# Patient Record
Sex: FEMALE | Race: WHITE | NOT HISPANIC OR LATINO | Employment: FULL TIME | URBAN - METROPOLITAN AREA
[De-identification: names, ages, dates, MRNs, and addresses within clinical notes are randomized per-mention and may not be internally consistent; named-entity substitution may affect disease eponyms.]

---

## 2017-01-03 ENCOUNTER — TRANSFERRED RECORDS (OUTPATIENT)
Dept: HEALTH INFORMATION MANAGEMENT | Facility: CLINIC | Age: 57
End: 2017-01-03

## 2017-01-06 ENCOUNTER — OFFICE VISIT (OUTPATIENT)
Dept: FAMILY MEDICINE | Facility: CLINIC | Age: 57
End: 2017-01-06
Payer: OTHER MISCELLANEOUS

## 2017-01-06 VITALS
DIASTOLIC BLOOD PRESSURE: 73 MMHG | TEMPERATURE: 97.4 F | WEIGHT: 147 LBS | HEIGHT: 63 IN | OXYGEN SATURATION: 100 % | RESPIRATION RATE: 12 BRPM | BODY MASS INDEX: 26.05 KG/M2 | HEART RATE: 77 BPM | SYSTOLIC BLOOD PRESSURE: 115 MMHG

## 2017-01-06 DIAGNOSIS — S52.124D CLOSED NONDISPLACED FRACTURE OF HEAD OF RIGHT RADIUS WITH ROUTINE HEALING, SUBSEQUENT ENCOUNTER: ICD-10-CM

## 2017-01-06 DIAGNOSIS — Z01.818 PREOP GENERAL PHYSICAL EXAM: Primary | ICD-10-CM

## 2017-01-06 LAB — HGB BLD-MCNC: 12.9 G/DL (ref 11.7–15.7)

## 2017-01-06 PROCEDURE — 99214 OFFICE O/P EST MOD 30 MIN: CPT | Performed by: NURSE PRACTITIONER

## 2017-01-06 PROCEDURE — 85018 HEMOGLOBIN: CPT | Performed by: NURSE PRACTITIONER

## 2017-01-06 PROCEDURE — 93000 ELECTROCARDIOGRAM COMPLETE: CPT | Performed by: NURSE PRACTITIONER

## 2017-01-06 PROCEDURE — 36415 COLL VENOUS BLD VENIPUNCTURE: CPT | Performed by: NURSE PRACTITIONER

## 2017-01-06 NOTE — NURSING NOTE
"Chief Complaint   Patient presents with     Pre-Op Exam     /73 mmHg  Pulse 77  Temp(Src) 97.4  F (36.3  C) (Tympanic)  Resp 12  Ht 5' 3\" (1.6 m)  Wt 147 lb (66.679 kg)  BMI 26.05 kg/m2  SpO2 100%  LMP 12/18/2004  Breastfeeding? No Estimated body mass index is 26.05 kg/(m^2) as calculated from the following:    Height as of this encounter: 5' 3\" (1.6 m).    Weight as of this encounter: 147 lb (66.679 kg).  bp completed using cuff size: regular      Health Maintenance that is potentially due pending provider review:  NONE    n/a    DOMENIC Glass    "

## 2017-01-06 NOTE — PROGRESS NOTES
[address] [address]  [phone]  Dept: [phone]    PRE-OP EVALUATION:  Today's date: 2017    Sejal Devlin (: 1960) presents for pre-operative evaluation assessment as requested by Dr. Hooks.  She requires evaluation and anesthesia risk assessment prior to undergoing surgery/procedure for treatment of Right forearm fracture.  Proposed procedure: Plate for broken right forearm    Date of Surgery/ Procedure: 17  Time of Surgery/ Procedure: [facility]  Hospital/Surgical Facility: [facility]   Fax number for surgical facility: [phone]  Primary Physician: Marita Freitas  Type of Anesthesia Anticipated: to be determined    Patient has a Health Care Directive or Living Will:  YES     1. NO - Do you have a history of heart attack, stroke, stent, bypass or surgery on an artery in the head, neck, heart or legs?  2. NO - Do you ever have any pain or discomfort in your chest?  3. NO - Do you have a history of  Heart Failure?  4. NO - Are you troubled by shortness of breath when: walking on the level, up a slight hill or at night?  5. NO - Do you currently have a cold, bronchitis or other respiratory infection?  6. NO - Do you have a cough, shortness of breath or wheezing?  7. NO - Do you sometimes get pains in the calves of your legs when you walk?  8. NO - Do you or anyone in your family have previous history of blood clots?  9. NO - Do you or does anyone in your family have a serious bleeding problem such as prolonged bleeding following surgeries or cuts?  10. NO - Have you ever had problems with anemia or been told to take iron pills?  11. NO - Have you had any abnormal blood loss such as black, tarry or bloody stools, or abnormal vaginal bleeding?  12. NO - Have you ever had a blood transfusion?  13. NO - Have you or any of your relatives ever had problems with anesthesia?  14. NO - Do you have sleep apnea, excessive snoring  or daytime drowsiness?  15. NO - Do you have any prosthetic heart valves?  16. NO - Do you have prosthetic joints?  17. NO - Is there any chance that you may be pregnant?      HPI:                                                      Brief HPI related to upcoming procedure:   Sejal reports that she fell on Friday,  and broke her right arm.  She went to [facility].  She has splint on her right arm.  She is now scheduled for surgery.        MEDICAL HISTORY:                                                      Patient Active Problem List    Diagnosis Date Noted     Vitamin D deficiency 2016     Priority: Medium     Dry mouth 2013     Priority: Medium     Circulation problem 10/02/2013     Priority: Medium     Rash 2013     Priority: Medium     Family history of colon cancer 2012     Priority: Medium     Mother         Hyperlipidemia LDL goal <160 10/31/2010     Priority: Medium     Per provider       Osteopenia 2010     Priority: Medium      Past Medical History   Diagnosis Date     Postmenopausal 2005     Diverticulosis of colon 2008     as noted on colonoscopy     Family history of colon cancer 2012     Past Surgical History   Procedure Laterality Date     C removal gallbladder       Cholecystectomy     Appendectomy       Surgical history of -        c- section     Colonoscopy       Gyn surgery            Current Outpatient Prescriptions   Medication Sig Dispense Refill     VITAMIN D, CHOLECALCIFEROL, PO Take by mouth daily       GNP GARLIC EXTRACT PO        aspirin EC 81 MG tablet Take 81 mg by mouth every other day.       OTC products: NSAIDS Ibuprofen, last dose 2017    Allergies   Allergen Reactions     No Known Drug Allergies      Seasonal Allergies       Latex Allergy: NO    Social History   Substance Use Topics     Smoking status: Former Smoker -- 0.50 packs/day for 10 years     Types: Cigarettes     Quit date: 2008  "    Smokeless tobacco: Never Used      Comment: smoked age [age], quit x 1 yr and resumed then quit after one yr     Alcohol Use: Yes      Comment: one beer a day     History   Drug Use No       REVIEW OF SYSTEMS:                                                    C: NEGATIVE for fever, chills, change in weight  I: NEGATIVE for worrisome rashes  E: NEGATIVE for vision changes or irritation  E/M: NEGATIVE for ear, mouth and throat problems  R: NEGATIVE for significant cough or SOB  CV: NEGATIVE for chest pain, palpitations or peripheral edema  GI: NEGATIVE for nausea, abdominal pain, heartburn, or change in bowel habits  : NEGATIVE for frequency, dysuria, or hematuria  M: POSITIVE for right arm fracture. NEGATIVE for other muscle or joint issues  N: NEGATIVE for weakness, dizziness or paresthesias  E: NEGATIVE for temperature intolerance, skin/hair changes  H: NEGATIVE for bleeding or bruising problems  P: NEGATIVE for changes in mood or affect    EXAM:                                                    /73 mmHg  Pulse 77  Temp(Src) 97.4  F (36.3  C) (Tympanic)  Resp 12  Ht 5' 3\" (1.6 m)  Wt 147 lb (66.679 kg)  BMI 26.05 kg/m2  SpO2 100%  LMP 12/18/2004  Breastfeeding? No    GENERAL APPEARANCE: healthy, alert and no distress     EYES: EOMI,- PERRL     HENT: ear canals and TM's normal and nose and mouth without ulcers or lesions     NECK: no adenopathy, no asymmetry, masses, or scars and thyroid normal to palpation     RESP: lungs clear to auscultation - no rales, rhonchi or wheezes     CV: regular rates and rhythm, normal S1 S2, no S3 or S4 and no murmur, click or rub -     ABDOMEN:  soft, nontender, no HSM or masses and bowel sounds normal     MS: right arm is in a splint. CWMS intact to right hand fingers. all other extremities normal- no gross deformities noted, no evidence of inflammation in joints, FROM in all extremities.      SKIN: no suspicious lesions or rashes     NEURO: Normal strength and " tone, sensory exam grossly normal, mentation intact and speech normal     PSYCH: mentation appears normal. and affect normal/bright     LYMPHATICS: No axillary, cervical, inguinal, or supraclavicular nodes    DIAGNOSTICS:                                                      Labs Resulted Today:   Results for orders placed or performed in visit on 01/06/17   Hemoglobin   Result Value Ref Range    Hemoglobin 12.9 11.7 - 15.7 g/dL     EKG: NSR    Recent Labs   Lab Test  09/26/16   1627  08/29/14   1706  03/05/14   1619  10/21/13   1428   HGB  13.2  13.3  13.0  14.2   PLT   --   231  224   --    NA   --    --   140  138   POTASSIUM   --    --   4.0  5.3   CR   --   0.77  0.85  0.73   A1C   --    --   5.7   --         IMPRESSION:                                                    Reason for surgery/procedure: right arm fracture  Diagnosis/reason for consult: pre-op    The proposed surgical procedure is considered INTERMEDIATE risk.    REVISED CARDIAC RISK INDEX  The patient has the following serious cardiovascular risks for perioperative complications such as (MI, PE, VFib and 3  AV Block):  No serious cardiac risks  INTERPRETATION: 0 risks: Class I (very low risk - 0.4% complication rate)    The patient has the following additional risks for perioperative complications:  No identified additional risks      ICD-10-CM    1. Preop general physical exam Z01.818 Hemoglobin     EKG 12-lead complete w/read - Clinics   2. Closed nondisplaced fracture of head of right radius with routine healing, subsequent encounter S52.124D Hemoglobin     EKG 12-lead complete w/read - Clinics       RECOMMENDATIONS:                                                        --Patient does not have scheduled medications the day of surgery    APPROVAL GIVEN to proceed with proposed procedure, without further diagnostic evaluation       Signed Electronically by: JONATAN Peraza CNP    Copy of this evaluation report is provided to  requesting physician.    [place] Preop Guidelines

## 2017-01-09 ENCOUNTER — TRANSFERRED RECORDS (OUTPATIENT)
Dept: HEALTH INFORMATION MANAGEMENT | Facility: CLINIC | Age: 57
End: 2017-01-09

## 2017-01-09 NOTE — PROGRESS NOTES
Quick Note:    Angela Post,    This is to let you know that the results of your recent blood tests are all normal.     Let me know if you have any questions.    Marita CHEUNG CNP    ______

## 2017-01-17 ENCOUNTER — TRANSFERRED RECORDS (OUTPATIENT)
Dept: HEALTH INFORMATION MANAGEMENT | Facility: CLINIC | Age: 57
End: 2017-01-17

## 2017-02-08 ENCOUNTER — TRANSFERRED RECORDS (OUTPATIENT)
Dept: HEALTH INFORMATION MANAGEMENT | Facility: CLINIC | Age: 57
End: 2017-02-08

## 2017-03-17 ENCOUNTER — TRANSFERRED RECORDS (OUTPATIENT)
Dept: HEALTH INFORMATION MANAGEMENT | Facility: CLINIC | Age: 57
End: 2017-03-17

## 2017-05-16 ENCOUNTER — MYC MEDICAL ADVICE (OUTPATIENT)
Dept: FAMILY MEDICINE | Facility: CLINIC | Age: 57
End: 2017-05-16

## 2017-05-24 ENCOUNTER — OFFICE VISIT (OUTPATIENT)
Dept: FAMILY MEDICINE | Facility: CLINIC | Age: 57
End: 2017-05-24
Payer: OTHER MISCELLANEOUS

## 2017-05-24 ENCOUNTER — RADIANT APPOINTMENT (OUTPATIENT)
Dept: GENERAL RADIOLOGY | Facility: CLINIC | Age: 57
End: 2017-05-24
Attending: NURSE PRACTITIONER
Payer: COMMERCIAL

## 2017-05-24 VITALS
OXYGEN SATURATION: 100 % | WEIGHT: 149.6 LBS | HEART RATE: 75 BPM | RESPIRATION RATE: 18 BRPM | DIASTOLIC BLOOD PRESSURE: 80 MMHG | TEMPERATURE: 98.6 F | BODY MASS INDEX: 26.5 KG/M2 | SYSTOLIC BLOOD PRESSURE: 125 MMHG

## 2017-05-24 DIAGNOSIS — M35.00 SJOGREN'S SYNDROME (H): ICD-10-CM

## 2017-05-24 DIAGNOSIS — R91.8 PULMONARY NODULES: ICD-10-CM

## 2017-05-24 DIAGNOSIS — R05.9 COUGH: Primary | ICD-10-CM

## 2017-05-24 DIAGNOSIS — R05.9 COUGH: ICD-10-CM

## 2017-05-24 PROCEDURE — 71020 XR CHEST 2 VW: CPT

## 2017-05-24 PROCEDURE — 99214 OFFICE O/P EST MOD 30 MIN: CPT | Performed by: NURSE PRACTITIONER

## 2017-05-24 NOTE — PROGRESS NOTES
SUBJECTIVE:                                                    Sejal Devlin is a 56 year old female who presents to clinic today for the following health issues:  Chief Complaint   Patient presents with     Cough       Cough    Duration: >8 weeks    Description (location/character/radiation): Sejal had symptoms that started  with c/o a URI. Her general URI symptoms improved, but the cough continued.  Today, no fever. Eating and drinking okay. Her cough is dry and hacky. Her symptoms are usually better in the morning and then get progressively worse as the day goes along. She did take a round of azithromycin, but that did not resolve her symptoms. She also uses tessalon 100mg, which helps some. Sometimes she feels tight and wheezy. No shortness of breath.  No history of asthma or allergies. No GERD or indigestion.      Intensity:  moderate    Accompanying signs and symptoms: none    Therapies tried and outcome:  Pt has been on Azithromycin and Benzonatate, cough drops.        History of Sjogren's.  Last seen by Dr. Chandler     Problem list and histories reviewed & adjusted, as indicated.  Additional history: as documented    Patient Active Problem List   Diagnosis     Osteopenia     Hyperlipidemia LDL goal <160     Family history of colon cancer     Rash     Circulation problem     Dry mouth     Vitamin D deficiency     Sjogren's syndrome (H)     Past Surgical History:   Procedure Laterality Date     APPENDECTOMY       C REMOVAL GALLBLADDER      Cholecystectomy     COLONOSCOPY  2013     GYN SURGERY           SURGICAL HISTORY OF -       c- section       Social History   Substance Use Topics     Smoking status: Former Smoker     Packs/day: 0.50     Years: 10.00     Types: Cigarettes     Start date: 2015     Quit date: 2008     Smokeless tobacco: Never Used      Comment: smoked age [age], quit x 1 yr and resumed then quit after one yr     Alcohol use Yes      Comment: one  beer a day     Family History   Problem Relation Age of Onset     ALPESHAMERON Mother      50s     Cancer - colorectal Mother       age 64, diagnosed age 54     Circulatory Mother      Coronary Artery Disease Mother      Colon Cancer Mother      C.A.CLAUDIO Father      Eye Disorder Father      glaucoma     Lipids Father      CANCER Father      kidney     Hyperlipidemia Father      Substance Abuse Brother      Cardiovascular Brother      MI, stent at 52     Connective Tissue Disorder Other      cousin on father side has lupus     Coronary Artery Disease Brother          Current Outpatient Prescriptions   Medication Sig Dispense Refill     VITAMIN D, CHOLECALCIFEROL, PO Take by mouth daily       GNP GARLIC EXTRACT PO        aspirin EC 81 MG tablet Take 81 mg by mouth every other day.       Allergies   Allergen Reactions     No Known Drug Allergies      Seasonal Allergies      Recent Labs   Lab Test  16   1627  14   1706  14   1619  10/21/13   1428  13   1451  12   0839  11   0830   A1C   --    --   5.7   --    --    --    --    LDL  115*   --    --    --    --   147*  176*   HDL  47*   --    --    --    --   49*  55   TRIG  237*   --    --    --    --   151*  155*   ALT   --   22  25  29  23   --    --    CR   --   0.77  0.85  0.73  0.74   --    --    GFRESTIMATED   --   77  70  83  82   --    --    GFRESTBLACK   --   >90   GFR Calc    84  >90  >90   --    --    POTASSIUM   --    --   4.0  5.3  4.6   --    --    TSH   --    --    --   1.24  0.85   --    --       BP Readings from Last 3 Encounters:   17 125/80   17 115/73   16 134/87    Wt Readings from Last 3 Encounters:   17 149 lb 9.6 oz (67.9 kg)   17 147 lb (66.7 kg)   16 145 lb (65.8 kg)                  Labs reviewed in EPIC    Reviewed and updated as needed this visit by clinical staff  Tobacco  Allergies  Med Hx  Surg Hx  Fam Hx  Soc Hx      Reviewed and updated as needed  this visit by Provider         ROS:  Constitutional, HEENT, cardiovascular, pulmonary, gi and gu systems are negative, except as otherwise noted.    OBJECTIVE:                                                    /80  Pulse 75  Temp 98.6  F (37  C) (Oral)  Resp 18  Wt 149 lb 9.6 oz (67.9 kg)  LMP 12/18/2004  SpO2 100%  Breastfeeding? No  BMI 26.5 kg/m2  Body mass index is 26.5 kg/(m^2).  EXAM:  Constitutional: healthy, alert, active and mild distress. Tearful.  Neck: Neck supple. No adenopathy.  ENT: Bilateral TM's are normal.  Posterior oropharynx is clear.  Nares clear without congestion.  Cardiovascular: S1, S2  Respiratory: occassional hacky cough noted.  Respirations easy and regular. No respiratory distress. Lungs sounds with rare insp/exp wheeze.    Skin: warm and dry  Psychiatric: mentation appears normal. and affect normal/bright       ASSESSMENT/PLAN:                                                    (R05) Cough  (primary encounter diagnosis)  Comment:   Plan: XR Chest 2 Views, fluticasone-salmeterol         (ADVAIR) 100-50 MCG/DOSE diskus inhaler, CT         Chest w/o Contrast            (M35.00) Sjogren's syndrome (H)  Comment:   Plan: XR Chest 2 Views, fluticasone-salmeterol         (ADVAIR) 100-50 MCG/DOSE diskus inhaler, CT         Chest w/o Contrast            (R91.8) Pulmonary nodules  Comment: 5 mm pulmonary nodule along the left major fissure. Uncertain of current status.  Plan: XR Chest 2 Views, fluticasone-salmeterol         (ADVAIR) 100-50 MCG/DOSE diskus inhaler, CT         Chest w/o Contrast            During the patient's clinic appointment, I placed a telephone call to the [facility] rheumatologist on call.  I was able to have a discussion with Dr. Chandler regarding Sejal, her cough, or history, and establish a plan of care.  With Dr. Chandler's recommendation, I did place the order for the computed tomography scan with contrast. The patient's chest x-ray today is  clear.  This was discussed with Sejal.  She will schedule a computed tomography scan with contrast at earliest convenience.  I also started her on Advair per Dr. Chandler's request.  She will use humidity and cough lozenges in the meantime.  She will expect a call from Dr. Chandler's staff to schedule a clinic appointment for Sjogren's follow-up and discussion of the results of the chest CT. The patient was reassured.  She was very relieved with all this information.  She will continue symptomatic management and will follow up as instructed.     Total: 30 minutes was spent with the patient, greater than 50% of the time was spent face-to-face regarding her of symptoms, reviewing her history, having an active discussion with the rheumatologist on call, and developing a plan of care.    Patient Instructions   I will mychart message you with the final radiologist's review of your chest xray from today.  Take care of yourself with a healthy diet, fluids, rest, etc.     Advair inhaler: use the inhaler twice a day. Rinse your mouth out after use.  Tessalon: you can still continue intermittently for cough.    Chest CT:  Proceed with the chest CT at your earliest convenience.  You can call [phone], ask to be scheduled in the [facility].    Per Dr. Chandler, I am to send a note to her through the computer staff messages.  Her staff will be notified that you need an appointment for follow up and they should be calling you to schedule that.            JONATAN Peraza [facility]

## 2017-05-24 NOTE — MR AVS SNAPSHOT
After Visit Summary   5/24/2017    Sejal Devlin    MRN: [id number]           Patient Information     Date Of Birth          1960        Visit Information        Provider Department      5/24/2017 2:20 PM Marita Freitas APRN CNP [facility]        Today's Diagnoses     Cough    -  1    Sjogren's syndrome (H)        Pulmonary nodules          Care Instructions    I will mychart message you with the final radiologist's review of your chest xray from today.  Take care of yourself with a healthy diet, fluids, rest, etc.     Advair inhaler: use the inhaler twice a day. Rinse your mouth out after use.  Tessalon: you can still continue intermittently for cough.    Chest CT:  Proceed with the chest CT at your earliest convenience.  You can call [phone], ask to be scheduled in the [facility] building.    Per Dr. Chandler, I am to send a note to her through the computer staff messages.  Her staff will be notified that you need an appointment for follow up and they should be calling you to schedule that.              Follow-ups after your visit        Future tests that were ordered for you today     Open Future Orders        Priority Expected Expires Ordered    CT Chest w/o Contrast Routine  5/24/2018 5/24/2017            Who to contact     If you have questions or need follow up information about today's clinic visit or your schedule please contact [facility] directly at [phone].  Normal or non-critical lab and imaging results will be communicated to you by MyChart, letter or phone within 4 business days after the clinic has received the results. If you do not hear from us within 7 days, please contact the clinic through MyChart or phone. If you have a critical or abnormal lab result, we will notify you by phone as soon as possible.  Submit refill requests through [place] or call your pharmacy and they will forward the refill request to us. Please allow 3  business days for your refill to be completed.          Additional Information About Your Visit        MyChart Information     [employer] gives you secure access to your electronic health record. If you see a primary care provider, you can also send messages to your care team and make appointments. If you have questions, please call your primary care clinic.  If you do not have a primary care provider, please call [phone] and they will assist you.        Care EveryWhere ID     This is your Care EveryWhere ID. This could be used by other organizations to access your [facility] medical records  [id number]        Your Vitals Were     Pulse Temperature Respirations Last Period Pulse Oximetry Breastfeeding?    75 98.6  F (37  C) (Oral) 18 12/18/2004 100% No    BMI (Body Mass Index)                   26.5 kg/m2            Blood Pressure from Last 3 Encounters:   05/24/17 125/80   01/06/17 115/73   11/21/16 134/87    Weight from Last 3 Encounters:   05/24/17 149 lb 9.6 oz (67.9 kg)   01/06/17 147 lb (66.7 kg)   11/21/16 145 lb (65.8 kg)                 Today's Medication Changes          These changes are accurate as of: 5/24/17  3:22 PM.  If you have any questions, ask your nurse or doctor.               Start taking these medicines.        Dose/Directions    fluticasone-salmeterol 100-50 MCG/DOSE diskus inhaler   Commonly known as:  ADVAIR   Used for:  Cough, Sjogren's syndrome (H), Pulmonary nodules   Started by:  Marita Freitas APRN CNP        Dose:  1 puff   Inhale 1 puff into the lungs 2 times daily   Quantity:  1 Inhaler   Refills:  1            Where to get your medicines      These medications were sent to [facility] [address] [place] [address]), [place] - [address]) [place] [address]     Phone:  [phone]     fluticasone-salmeterol 100-50 MCG/DOSE diskus inhaler                Primary Care Provider Office Phone # Fax #    Marita Freitas  JONATAN [phone]       [facility] [address] [place] [address]        Thank you!     Thank you for choosing [facility]  for your care. Our goal is always to provide you with excellent care. Hearing back from our patients is one way we can continue to improve our services. Please take a few minutes to complete the written survey that you may receive in the mail after your visit with us. Thank you!             Your Updated Medication List - Protect others around you: Learn how to safely use, store and throw away your medicines at www.disposemymeds.org.          This list is accurate as of: 5/24/17  3:22 PM.  Always use your most recent med list.                   Brand Name Dispense Instructions for use    aspirin EC 81 MG EC tablet      Take 81 mg by mouth every other day.       fluticasone-salmeterol 100-50 MCG/DOSE diskus inhaler    ADVAIR    1 Inhaler    Inhale 1 puff into the lungs 2 times daily       GNP GARLIC EXTRACT PO          VITAMIN D (CHOLECALCIFEROL) PO      Take by mouth daily

## 2017-05-24 NOTE — NURSING NOTE
"Chief Complaint   Patient presents with     Chronic Cough       Initial /80  Pulse 75  Temp 98.6  F (37  C) (Oral)  Resp 18  Wt 149 lb 9.6 oz (67.9 kg)  LMP 12/18/2004  SpO2 100%  Breastfeeding? No  BMI 26.5 kg/m2 Estimated body mass index is 26.5 kg/(m^2) as calculated from the following:    Height as of 1/6/17: 5' 3\" (1.6 m).    Weight as of this encounter: 149 lb 9.6 oz (67.9 kg).  Medication Reconciliation: complete     Mylene Cruz MA    "

## 2017-05-24 NOTE — PATIENT INSTRUCTIONS
I will mychart message you with the final radiologist's review of your chest xray from today.  Take care of yourself with a healthy diet, fluids, rest, etc.     Advair inhaler: use the inhaler twice a day. Rinse your mouth out after use.  Tessalon: you can still continue intermittently for cough.    Chest CT:  Proceed with the chest CT at your earliest convenience.  You can call [phone], ask to be scheduled in the [facility] building.    Per Dr. Chandler, I am to send a note to her through the computer staff messages.  Her staff will be notified that you need an appointment for follow up and they should be calling you to schedule that.

## 2017-05-25 NOTE — PROGRESS NOTES
Angela Post,    Here is the radiologist's final reading on your chest xray. Let me know if you have any questions.    Marita

## 2017-05-30 ENCOUNTER — MYC MEDICAL ADVICE (OUTPATIENT)
Dept: FAMILY MEDICINE | Facility: CLINIC | Age: 57
End: 2017-05-30

## 2017-05-30 ENCOUNTER — TELEPHONE (OUTPATIENT)
Dept: RHEUMATOLOGY | Facility: CLINIC | Age: 57
End: 2017-05-30

## 2017-05-30 NOTE — TELEPHONE ENCOUNTER
----- Message from Acacia Chandler MD sent at 5/26/2017  8:57 AM CDT -----  Regarding: RE: appointment/follow up  Sure, I'll follow chest CT. Roxana, could you please help to schedule her at my next available appointment.  ----- Message -----     From: Marita Freitas APRN CNP     Sent: 5/25/2017   9:37 AM       To: Acacia Chandler MD  Subject: appointment/follow up                            [facility] Dr. Chandler,    Thank you for taking my call yesterday. This note is to you to request an appointment for Sejal with you after she completes her CT scan of the chest.    Thank you for your help!  Marita CHEUNG CNP

## 2017-05-31 NOTE — TELEPHONE ENCOUNTER
Called and spoke with pt in order to set up an appt.  Discussed that she does have to pay for a lot of healthcare out of pocket, so does need to space things out so she can afford them.  Pt will be making Chest CT for about a month, as she does want to see if the inhalers that she was prescribed, help.  Pt has set up an appt for Dr. Chandler for October, that is the next available. Pt made aware that she does need to be seen by Dr. Chandler at least once every 3 years.  Pt understands and is fine with appt in October.    Pt had no further questions and is aware that Dr. Chandler will follow up with her after CT has been read.    Roxana Cantu RN  [facility]

## 2017-06-13 ENCOUNTER — MYC MEDICAL ADVICE (OUTPATIENT)
Dept: FAMILY MEDICINE | Facility: CLINIC | Age: 57
End: 2017-06-13

## 2017-07-12 ENCOUNTER — MYC MEDICAL ADVICE (OUTPATIENT)
Dept: FAMILY MEDICINE | Facility: CLINIC | Age: 57
End: 2017-07-12

## 2017-07-13 NOTE — TELEPHONE ENCOUNTER
Medication picked up and patient CT results communicated by PCP. No further action at this time.     Thanks! Radha Baig RN

## 2017-07-24 ENCOUNTER — TELEPHONE (OUTPATIENT)
Dept: FAMILY MEDICINE | Facility: CLINIC | Age: 57
End: 2017-07-24

## 2017-07-24 NOTE — TELEPHONE ENCOUNTER
"I did reach pt to triage this    :\"it is nothing new\" \"I might be dealing with chronic bronchitis\" \"Had cold in March and cough never went away\" Used Advair which helped and is using it now also. CT scan looked normal but a 4 mm nodule that \"they are not even worried about\"     States these are not new sx but ongoing and she feels ok to wait until 8/1    If symptoms worsen or change to call back. If after hours to UC/ER.  Susanne Lockett RN    "

## 2017-07-24 NOTE — TELEPHONE ENCOUNTER
Pt has made an appt through [facility] on 8/1 for following reason:    8/1/2017     7:20 AM  20 mins.  Marita Freitas, APRN CNP [facility]           Patient Comments:     Primary Care     1) Discuss ct chest and plan of action for continued      cough and shortness of breath     2) Muscle spasms in right jaw, approximately 8 to 10 a      day.  Not painful but ongoing, daily.  (Not previously      reported.  Started a few weeks ago.  Thought it would      cease, so didn't report, but it persists.)             M          Message sent to triage RN to assess    Susanne Lockett, RN, BSN

## 2017-08-01 ENCOUNTER — OFFICE VISIT (OUTPATIENT)
Dept: FAMILY MEDICINE | Facility: CLINIC | Age: 57
End: 2017-08-01
Payer: COMMERCIAL

## 2017-08-01 VITALS
RESPIRATION RATE: 24 BRPM | HEIGHT: 63 IN | WEIGHT: 150 LBS | OXYGEN SATURATION: 100 % | TEMPERATURE: 97.5 F | DIASTOLIC BLOOD PRESSURE: 78 MMHG | HEART RATE: 78 BPM | SYSTOLIC BLOOD PRESSURE: 123 MMHG | BODY MASS INDEX: 26.58 KG/M2

## 2017-08-01 DIAGNOSIS — R05.9 COUGH: Primary | ICD-10-CM

## 2017-08-01 DIAGNOSIS — M35.00 SJOGREN'S SYNDROME, WITH UNSPECIFIED ORGAN INVOLVEMENT (H): ICD-10-CM

## 2017-08-01 PROCEDURE — 99213 OFFICE O/P EST LOW 20 MIN: CPT | Performed by: NURSE PRACTITIONER

## 2017-08-01 RX ORDER — ALBUTEROL SULFATE 90 UG/1
1-2 AEROSOL, METERED RESPIRATORY (INHALATION) EVERY 4 HOURS PRN
Qty: 1 INHALER | Refills: 3 | Status: SHIPPED | OUTPATIENT
Start: 2017-08-01 | End: 2017-10-11

## 2017-08-01 NOTE — Clinical Note
Angela Chandler  Sejal's hacky cough has seemed to increase in frequency since I last saw her in the clinic. She's been taking her Advair twice a day as prescribed and she declined a dose adjustment on that. I did refer her to pulmonology for cough workup. She will continue/come in to see you in October as planned. Please notify this patient if an earlier appointment is available.  Thank you! Marita CHEUNG CNP

## 2017-08-01 NOTE — MR AVS SNAPSHOT
After Visit Summary   8/1/2017    Sejal Devlin    MRN: [id number]           Patient Information     Date Of Birth          1960        Visit Information        Provider Department      8/1/2017 7:20 AM Marita Freitas APRN [facility]        Today's Diagnoses     Cough    -  1       Follow-ups after your visit        Additional Services     PULMONARY MEDICINE REFERRAL       Your provider has referred you to: [facility]: [facility] [phone]   [email or web address]  [facility]: [facility] (Adults & Pediatrics) - [place] [phone]   [email or web address]/  [facility]: Lung Disease and Pulmonary Clinic - [place] [phone]   [email or web address]/lung-disease-[email or web address]-pulmonary-[email or web address]/  [facility]: [facility] [phone]   [email or web address]    Please be aware that coverage of these services is subject to the terms and limitations of your health insurance plan.  Call member services at your health plan with any benefit or coverage questions.      Please bring the following with you to your appointment:    (1) Any X-Rays, CTs or MRIs which have been performed.  Contact the facility where they were done to arrange for  prior to your scheduled appointment.    (2) List of current medications   (3) This referral request   (4) Any documents/labs given to you for this referral                  Your next 10 appointments already scheduled     Oct 11, 2017  8:00 AM CDT   (Arrive by 7:45 AM)   Return Visit with Acacia Chandler MD   [facility] ([facility])    [address] [address] [phone]              Who to contact     If you have questions or need follow up information about today's clinic visit or your  "schedule please contact [facility] directly at [phone].  Normal or non-critical lab and imaging results will be communicated to you by MyChart, letter or phone within 4 business days after the clinic has received the results. If you do not hear from us within 7 days, please contact the clinic through [employer] or phone. If you have a critical or abnormal lab result, we will notify you by phone as soon as possible.  Submit refill requests through [employer] or call your pharmacy and they will forward the refill request to us. Please allow 3 business days for your refill to be completed.          Additional Information About Your Visit        [employer] Information     [employer] gives you secure access to your electronic health record. If you see a primary care provider, you can also send messages to your care team and make appointments. If you have questions, please call your primary care clinic.  If you do not have a primary care provider, please call [phone] and they will assist you.        Care EveryWhere ID     This is your Care EveryWhere ID. This could be used by other organizations to access your [place] medical records  [id number]        Your Vitals Were     Pulse Temperature Respirations Height Last Period Pulse Oximetry    78 97.5  F (36.4  C) (Oral) 24 5' 3\" (1.6 m) 12/18/2004 100%    BMI (Body Mass Index)                   26.57 kg/m2            Blood Pressure from Last 3 Encounters:   08/01/17 123/78   05/24/17 125/80   01/06/17 115/73    Weight from Last 3 Encounters:   08/01/17 150 lb (68 kg)   05/24/17 149 lb 9.6 oz (67.9 kg)   01/06/17 147 lb (66.7 kg)              We Performed the Following     PULMONARY MEDICINE REFERRAL          Today's Medication Changes          These changes are accurate as of: 8/1/17  7:45 AM.  If you have any questions, ask your nurse or doctor.               Start taking these medicines.        Dose/Directions    albuterol 108 (90 BASE) MCG/ACT Inhaler "   Commonly known as:  albuterol   Used for:  Cough   Started by:  Marita Freitas APRN CNP        Dose:  1-2 puff   Inhale 1-2 puffs into the lungs every 4 hours as needed for shortness of breath / dyspnea or wheezing   Quantity:  1 Inhaler   Refills:  3            Where to get your medicines      These medications were sent to [facility] [address] [place] [address], [address]) [place] [address]     Phone:  [phone]     albuterol 108 (90 BASE) MCG/ACT Inhaler                Primary Care Provider Office Phone # Fax #    JONATAN Ramírez [phone]       [facility] [address] [place] [address]        Equal Access to Services     XENA LUIS : Hadii aad ku hadasho Soomaali, waaxda luqadaha, qaybta kaalmada adeegyada, carly goldsmithin haymemo veliz . So [facility] [phone].    ATENCIÓN: Si habla español, tiene a badillo disposición servicios gratuitos de asistencia lingüística. Soraya al [phone].    We comply with applicable federal civil rights laws and [place] laws. We do not discriminate on the basis of race, color, national origin, age, disability sex, sexual orientation or gender identity.            Thank you!     Thank you for choosing [facility]  for your care. Our goal is always to provide you with excellent care. Hearing back from our patients is one way we can continue to improve our services. Please take a few minutes to complete the written survey that you may receive in the mail after your visit with us. Thank you!             Your Updated Medication List - Protect others around you: Learn how to safely use, store and throw away your medicines at www.disposemymeds.org.          This list is accurate as of: 8/1/17  7:45 AM.  Always use your most recent med list.                   Brand Name Dispense Instructions for use Diagnosis    albuterol  108 (90 BASE) MCG/ACT Inhaler    albuterol    1 Inhaler    Inhale 1-2 puffs into the lungs every 4 hours as needed for shortness of breath / dyspnea or wheezing    Cough       aspirin EC 81 MG EC tablet      Take 81 mg by mouth every other day.        fluticasone-salmeterol 100-50 MCG/DOSE diskus inhaler    ADVAIR    1 Inhaler    Inhale 1 puff into the lungs 2 times daily    Cough, Sjogren's syndrome (H), Pulmonary nodules       GNP GARLIC EXTRACT PO           VITAMIN D (CHOLECALCIFEROL) PO      Take by mouth daily

## 2017-08-01 NOTE — PROGRESS NOTES
"SUBJECTIVE: Sejal Devlin  is here today because of:  Chief Complaint   Patient presents with     Cough     not improving with inhaled steroids       The patient has had symptoms of a continued frequent hacky cough.  She has been treated with advair and has been using it regularly since May.  The cough has not improved.  No fever, chills.  Intermittent sweats, uncertain if this is menopause or different.  Right sided neck muscle spasms for weeks.  No congestion, rhinorrhea.  It was originally felt that the cough may have been related to her Sjogren's syndrome.  The advair was recommended by Dr. Chandler.  Upcoming appointment with Dr. Chandler October 2017.   The patient is in the clinic today to make sure that there is \"nothing else going on\" and to get a referral, possibly to pulmonology.   Sometimes she has difficulty falling asleep at night due to the hacky cough, but once she falls asleep \" she is fine.\"    Treatment measures tried include advair.  Patient is not a smoker    Current Outpatient Prescriptions   Medication Sig Dispense Refill     fluticasone-salmeterol (ADVAIR) 100-50 MCG/DOSE diskus inhaler Inhale 1 puff into the lungs 2 times daily 1 Inhaler 1     VITAMIN D, CHOLECALCIFEROL, PO Take by mouth daily       GNP GARLIC EXTRACT PO        aspirin EC 81 MG tablet Take 81 mg by mouth every other day.        Allergies   Allergen Reactions     No Known Drug Allergies      Seasonal Allergies         OBJECTIVE:   Vital signs:/78  Pulse 78  Temp 97.5  F (36.4  C) (Oral)  Resp 24  Ht 5' 3\" (1.6 m)  Wt 150 lb (68 kg)  LMP 12/18/2004  SpO2 100%  BMI 26.57 kg/m2   EXAM:  Constitutional: healthy, alert, active and no distress  Neck: Neck supple. No adenopathy.  ENT: Bilateral TM's are normal.  Posterior oropharynx is clear.  Nares clear without congestion.  Cardiovascular: S1, S2  Respiratory: frequent hacky cough. Respirations easy and regular. No respiratory distress. Lungs sounds CTA.  Skin: warm and " dry  Psychiatric: mentation appears normal. and affect normal/bright      ASSESSMENT:   (R05) Cough  (primary encounter diagnosis)  Comment:   Plan: PULMONARY MEDICINE REFERRAL, albuterol         (ALBUTEROL) 108 (90 BASE) MCG/ACT Inhaler        I did discuss with the patient today cough management.  Since she has been taking the Advair twice a day as prescribed (100/50) and her cough has not lessened, I did consider increasing the dose. The patient declined a dose adjustment.  For now, she requests to use the albuterol as needed.  She will schedule an appointment with pulmonology at her earliest convenience.  She will monitor for worsening and red flag symptoms. She will be reevaluated by me anytime if problems.  She was relieved to know there is no evidence of acute changes today.    (M35.00) Sjogren's syndrome, with unspecified organ involvement (H)  Comment:   Plan: She will keep her scheduled appointment with Dr. Chandler in October.  She is trying to get an earlier appointment.

## 2017-08-01 NOTE — NURSING NOTE
"Chief Complaint   Patient presents with     Cough       Initial /78  Pulse 78  Temp 97.5  F (36.4  C) (Oral)  Resp 24  Ht 5' 3\" (1.6 m)  Wt 150 lb (68 kg)  LMP 12/18/2004  SpO2 100%  BMI 26.57 kg/m2 Estimated body mass index is 26.57 kg/(m^2) as calculated from the following:    Height as of this encounter: 5' 3\" (1.6 m).    Weight as of this encounter: 150 lb (68 kg).  Medication Reconciliation: complete       Randy Robb MA       "

## 2017-08-08 NOTE — PROGRESS NOTES
Left voice message for her to call me back in regards to her pulmonology appointment.    Alicia La

## 2017-08-08 NOTE — PROGRESS NOTES
JENNY - spoke with Sejal and she stated that 10/6/17 was the soonest she could get in. This is from the Pulmonary clinic scheduling ( not because of her schedule.)  Called the clinics to try to get her in sooner but I couldn't do anything.   You can do a Provider to Provider call to [phone] and hopefully can get her in sooner that way.    Sejal knows that you are out of the clinic this week and that we will let her know next week.    Alicia La

## 2017-08-09 ENCOUNTER — MYC MEDICAL ADVICE (OUTPATIENT)
Dept: FAMILY MEDICINE | Facility: CLINIC | Age: 57
End: 2017-08-09

## 2017-08-21 ENCOUNTER — TRANSFERRED RECORDS (OUTPATIENT)
Dept: HEALTH INFORMATION MANAGEMENT | Facility: CLINIC | Age: 57
End: 2017-08-21

## 2017-08-24 ENCOUNTER — MYC MEDICAL ADVICE (OUTPATIENT)
Dept: FAMILY MEDICINE | Facility: CLINIC | Age: 57
End: 2017-08-24

## 2017-08-24 ENCOUNTER — TELEPHONE (OUTPATIENT)
Dept: FAMILY MEDICINE | Facility: CLINIC | Age: 57
End: 2017-08-24

## 2017-08-24 NOTE — TELEPHONE ENCOUNTER
Telephone call was placed to the [facility], pulmonology, physician to physician line.  Dr. Lujan, pulmonologist on call.  I was on hold for 30 min waiting for pulm to .      Telephone call received back from Dr. Lujan.  His recommendations:  GERD treatment.  Prednisone taper: 40mg x 3 days, 30mg x3 days, 20mg x3 days, 10mg x3 days.  PFT's after the steroid burst.  She should keep her scheduled appointment with pulm in October.  If her symptoms do not improve with the treatment or worsen in any way, then we will try to get her an earlier appointment with pulm.

## 2017-08-28 NOTE — TELEPHONE ENCOUNTER
Triage sent my chart message to patient asking her ot contact [facility] for their recommendations.  Mariam Higgins RN

## 2017-08-28 NOTE — TELEPHONE ENCOUNTER
I just received a consultation note from a pulmonologist at [facility].  That provider mapped out specifics for Sejal for her cough and breathing and a prednisone taper was mentioned/in the plan.  Please ask her to call [facility] and speak with one of the nurses/pulmonologist regarding her request.  Thank you.

## 2017-09-19 ENCOUNTER — MYC MEDICAL ADVICE (OUTPATIENT)
Dept: FAMILY MEDICINE | Facility: CLINIC | Age: 57
End: 2017-09-19

## 2017-09-19 NOTE — TELEPHONE ENCOUNTER
Liudmila Ware referrals:  Please can you answer the patients quesitons about the ENT being in network?  I believe it is in the  network.  Mariam Higgins RN

## 2017-09-19 NOTE — TELEPHONE ENCOUNTER
Spoke with pt and stated [facility] and [place] [facility] are both part of [facility] network. Pt will call [facility], to make appt and have them receive Orders from Dr Coyle, the Pulmonologist.     Liudmila Ware,  Referral Rep

## 2017-10-11 ENCOUNTER — OFFICE VISIT (OUTPATIENT)
Dept: RHEUMATOLOGY | Facility: CLINIC | Age: 57
End: 2017-10-11
Attending: INTERNAL MEDICINE
Payer: COMMERCIAL

## 2017-10-11 VITALS
WEIGHT: 152.6 LBS | HEART RATE: 83 BPM | TEMPERATURE: 97.8 F | SYSTOLIC BLOOD PRESSURE: 158 MMHG | RESPIRATION RATE: 18 BRPM | BODY MASS INDEX: 27.03 KG/M2 | DIASTOLIC BLOOD PRESSURE: 79 MMHG

## 2017-10-11 DIAGNOSIS — M35.00 SJOGREN'S SYNDROME, WITH UNSPECIFIED ORGAN INVOLVEMENT (H): Primary | ICD-10-CM

## 2017-10-11 DIAGNOSIS — Z23 ENCOUNTER FOR IMMUNIZATION: ICD-10-CM

## 2017-10-11 PROCEDURE — 99212 OFFICE O/P EST SF 10 MIN: CPT | Mod: 25,ZF

## 2017-10-11 PROCEDURE — 90686 IIV4 VACC NO PRSV 0.5 ML IM: CPT | Mod: ZF | Performed by: INTERNAL MEDICINE

## 2017-10-11 PROCEDURE — G0008 ADMIN INFLUENZA VIRUS VAC: HCPCS | Mod: ZF

## 2017-10-11 PROCEDURE — 25000128 H RX IP 250 OP 636: Mod: ZF | Performed by: INTERNAL MEDICINE

## 2017-10-11 RX ADMIN — INFLUENZA A VIRUS A/MICHIGAN/45/2015 X-275 (H1N1) ANTIGEN (FORMALDEHYDE INACTIVATED), INFLUENZA A VIRUS A/HONG KONG/4801/2014 X-263B (H3N2) ANTIGEN (FORMALDEHYDE INACTIVATED), INFLUENZA B VIRUS B/PHUKET/3073/2013 ANTIGEN (FORMALDEHYDE INACTIVATED), AND INFLUENZA B VIRUS B/BRISBANE/60/2008 ANTIGEN (FORMALDEHYDE INACTIVATED) 0.5 ML: 15; 15; 15; 15 INJECTION, SUSPENSION INTRAMUSCULAR at 12:16

## 2017-10-11 ASSESSMENT — PAIN SCALES - GENERAL: PAINLEVEL: NO PAIN (0)

## 2017-10-11 NOTE — PATIENT INSTRUCTIONS
Labs at [facility]    You could taper acid reflux medicine to 1 tab a day x 1 week and stop with follow up with PCP    Do follow up with pulmonary for lung nodule    Flu shot today    Return in a year

## 2017-10-11 NOTE — NURSING NOTE
"Chief Complaint   Patient presents with     RECHECK     Sjogren's follow up       Initial /79  Pulse 83  Temp 97.8  F (36.6  C) (Oral)  Resp 18  Wt 69.2 kg (152 lb 9.6 oz)  LMP 12/18/2004  BMI 27.03 kg/m2 Estimated body mass index is 27.03 kg/(m^2) as calculated from the following:    Height as of 8/1/17: 1.6 m (5' 3\").    Weight as of this encounter: 69.2 kg (152 lb 9.6 oz).  Medication Reconciliation: complete   KARLA FERMIN CMA      "

## 2017-10-11 NOTE — MR AVS SNAPSHOT
After Visit Summary   10/11/2017    Sejal Devlin    MRN: [id number]           Patient Information     Date Of Birth          1960        Visit Information        Provider Department      10/11/2017 8:00 AM Acacia Chandler MD [facility] Rhe[facility]ology        Today's Diagnoses     Sjogren's syndrome, with unspecified organ involvement (H)    -  1    Encounter for immunization          Care Instructions    Labs at [facility]    You could taper acid reflux medicine to 1 tab a day x 1 week and stop with follow up with PCP    Do follow up with pulmonary for lung nodule    Flu shot today    Return in a year          Follow-ups after your visit        Follow-up notes from your care team     Return in about 1 year (around 10/11/2018).      Your next 10 appointments already scheduled     Oct 11, 2018  9:00 AM CDT   (Arrive by 8:45 AM)   Return Visit with Acacia Chandler MD   [facility] ([facility])    [address] [place] [address] [phone]              Future tests that were ordered for you today     Open Future Orders        Priority Expected Expires Ordered    AST Routine  10/11/2018 10/11/2017    ALT Routine  10/11/2018 10/11/2017    CBC with platelets differential Routine  10/11/2018 10/11/2017    Creatinine Routine  10/11/2018 10/11/2017    Routine UA with micro reflex to culture Routine  10/11/2018 10/11/2017            Who to contact     If you have questions or need follow up information about today's clinic visit or your schedule please contact [facility] directly at [phone].  Normal or non-critical lab and imaging results will be communicated to you by MyChart, letter or phone within 4 business days after the clinic has received the results. If you do not hear from us within 7 days, please contact the clinic through MyChart or phone. If you have a critical or abnormal lab result, we will notify you  by phone as soon as possible.  Submit refill requests through [employer] or call your pharmacy and they will forward the refill request to us. Please allow 3 business days for your refill to be completed.          Additional Information About Your Visit        [employer] Information     [employer] gives you secure access to your electronic health record. If you see a primary care provider, you can also send messages to your care team and make appointments. If you have questions, please call your primary care clinic.  If you do not have a primary care provider, please call [phone] and they will assist you.        Care EveryWhere ID     This is your Care EveryWhere ID. This could be used by other organizations to access your [place] medical records  [id number]        Your Vitals Were     Pulse Temperature Respirations Last Period BMI (Body Mass Index)       83 97.8  F (36.6  C) (Oral) 18 12/18/2004 27.03 kg/m2        Blood Pressure from Last 3 Encounters:   10/11/17 158/79   08/01/17 123/78   05/24/17 125/80    Weight from Last 3 Encounters:   10/11/17 69.2 kg (152 lb 9.6 oz)   08/01/17 68 kg (150 lb)   05/24/17 67.9 kg (149 lb 9.6 oz)                 Today's Medication Changes          These changes are accurate as of: 10/11/17  9:18 AM.  If you have any questions, ask your nurse or doctor.               Stop taking these medicines if you haven't already. Please contact your care team if you have questions.     albuterol 108 (90 BASE) MCG/ACT Inhaler   Commonly known as:  PROAIR HFA   Stopped by:  Acacia Chandler MD           fluticasone-salmeterol 100-50 MCG/DOSE diskus inhaler   Commonly known as:  ADVAIR   Stopped by:  Acacia Chandler MD                    Primary Care Provider Office Phone # Fax #    JONATAN Ramírez [facility] [phone] [address] [place] [address]        Equal Access to Services     XENA LUIS AH: melanie Andrew,  raoul richmondaljada saldanazaynab ignacio ailyn myers. So [facility] [phone].    ATENCIÓN: Si sangeetala yue, tiene a badillo disposición servicios gratuitos de asistencia lingüística. Soraya al [phone].    We comply with applicable federal civil rights laws and [place] laws. We do not discriminate on the basis of race, color, national origin, age, disability, sex, sexual orientation, or gender identity.            Thank you!     Thank you for choosing [facility]  for your care. Our goal is always to provide you with excellent care. Hearing back from our patients is one way we can continue to improve our services. Please take a few minutes to complete the written survey that you may receive in the mail after your visit with us. Thank you!             Your Updated Medication List - Protect others around you: Learn how to safely use, store and throw away your medicines at www.disposemymeds.org.          This list is accurate as of: 10/11/17  9:18 AM.  Always use your most recent med list.                   Brand Name Dispense Instructions for use Diagnosis    FAMOTIDINE PO      Take 20 mg by mouth daily        GNP GARLIC EXTRACT PO           VITAMIN D (CHOLECALCIFEROL) PO      Take by mouth daily

## 2017-10-11 NOTE — LETTER
10/11/2017      RE: Sejal Devlin  [address] [place] [address]       Rheumatology Clinic Visit     Sejal Devlin MRN# [id number]   YOB: 1960 Age: 57 year old     Date of Visit: October 12, 2017  Primary care provider: Marita Freitas          Assessment and Plan:     Assessment:  Seronegative primary Sjogren's syndrome (+lip biopsy 5/2014). Continue kenalog in orabase paste PRN to be used for nasal ulcers. Sicca sx are mild therefore she would like to hold off using evoxac.     Chronic on and off shortness of breath with no ILD on chest CT followed by Pulmonary. We suspect GERD may impart the patient's cough and worsen the patient's shortness of breath, which is supported by her significant improvement on a H2 blocker.  She would seek immediate medical attention for acute shortness of breath or chest pain.      Plan:  - Labs at [facility]: ALT, AST, CBC, UA  - Currently on 20 mg of Famotidine, Trial taper 10 mg for 1 week, prior to discontinuing with follow up with PCP  - Follow up with pulmonary for incidental lung nodule found on chest CT  - Flu shot today  - Rtc in one year    Seen and staffed with Dr. Chandler.    Raimundo Wheatley MD, [facility]  Internal Medicine PGY-2  [facility]  Pager: [phone]      Attending Note: I saw and evaluated the patient with Dr. Wheatley. I agree with the assessment and plan.    Acacia Chandler MD            Active Problem List:     Patient Active Problem List    Diagnosis Date Noted     Sjogren's syndrome (H) 05/24/2017     Priority: Medium     Vitamin D deficiency 09/26/2016     Priority: Medium     Dry mouth 12/03/2013     Priority: Medium     Circulation problem 10/02/2013     Priority: Medium     Rash 06/06/2013     Priority: Medium     Family history of colon cancer 05/22/2012     Priority: Medium     Mother         Hyperlipidemia LDL goal <160 10/31/2010     Priority: Medium     Per provider       Osteopenia  06/23/2010     Priority: Medium            History of Present Illness:   Sejal Devlin is a 57 year old female who presents for Sjogren's follow up appointment.    03/05/14: HPI from initial visit     Ms. Devlin is a 54 yo WF who was referred to our clinic for evaluation and management of her several complaints including dryness of mouth. She wants to be evaluated for Sjogren's.     In 10/2013, when she was working on her garage sale at [employer], noticed having dryness of mouth which she blamed stress for it. It lasted x 2 weeks. Then sh developed salt/metallic taste in her mouth, chewing gums helps. Reports numbness of toes since last summer. She was diagnose with vaginal atrophy. Reports ear ache. Her joint pain started months ago. Most of her pain is localized to the knees, no joint swelling but has AM stiffness x 10-15 minutes. Reports getting sores in her nose x at least a year. She has tried putting vaseline on them, an dit has helped. Has no sores today. Gums are tender but has no oral ulcers. Has SOB x 2 yr, reports having neg work up for asthma. Reports getting episodes of SOB x 2-3 wk. She is in the middle of episode, gets SOB even at rest. She does not think it's anxiety related. Reports having fatigue, some days are better than others. Some days she wakes up with feeling sick and having flu like sx, but it does not happen often. Had 2-3 episodes of it. Reports itching of skin worse in summer, no visible rash. Itching gets worse in Sun. Reports having earaches lately. Had diarrhea but it's under better control with increasing fibers. She was to the point that she had to wear pads. Feels bloated but gets no belching.     08/29/14:Evoxac was added at last visit. Lip biopsy done in 5/2014 confirmed Dx of primary Sjogren's syndrome.     Complains of vaginal dryness, reports red painful rash under breasts and is using anti-fungal topical agent. Complains of salty taste in her mouth not much dryness (no GERD sx)  "therefore never tried evoxac. Her major complaint is episodes of SOB without any triggers, not related to activity or stress. Currently has no SOB. The episodes are self limited. Reports having these episodes x years with neg work up including NL PFT in the past. Episodes last between few days- up to 2 weeks. Had eye exam and was told having mild dryness of eyes. Dryness of eyes/mouth do not bother her. Has intermittent knee pain without joint swelling. Gets tingling in toes. Nasal ulcers are under control.     09/15/2014:  Yesterday she was feeling more shortness of breath.   Today and this afternoon, she is much better.   No exertional shortness of breath.   No chest pain.      TODAY: The patient reports feeling good, but reports having a long episode of SOB and cough during March and April of this year.  This started with a \"cool\" followed with a persistent cough that \"didn't go away\" also reported bad taste in her mouth during this time.  She was referred to pulmonology with CT chest showing no clear source of symptoms including ILD.  Patient also tired inhalers Albuterol and Advair with no improvement in symptoms. She reported slow improvement in her breathing.  Her symptoms seemed to respond to the addition of an H2 blocker and thinks that this my be the cause of the ongoing cough, which his since resolved.  Patient reports being on a stable dose of 20 mg of Famotidine since August, with no metallic or funny taste in her mouth.      Patient did have an incidental lung nodule picked up on her chest CT, that per the patient was assessed by her pulmonologist with a 1 year follow up Chest CT planned.  Patient is no a current smoker but has significant past smoking use.    Patient continues to have knee pain, that is stable and believes that her sjogren's is well controlled, with some itching over the summer but no rashes.  Patient dose notice her dry mouth, but it doesn't concern her.  Reports stable toe " numbness/neuropathy.         Review of Systems:     ROS:  A comprehensive ROS was done, positives are per HPI.    It is negative for fatigue, weight loss, fever, hair loss, rash, malar rash, discoid rash, photosensitivity, Raynaud's, oral ulcers, nasal ulcers,  Dysphagia, +dry mouth (stable), dry eyes, swollen lymph nodes, chest pain, SOB, cough, h/o serositis, N/V, heartburn, diarrhea, abdominal pain, blood in stool or urine, h/o proteinuria, myalgia, muscle weakness, +joint pain (noted in knees), joint swelling, morning stiffness, headaches, seizures, psychosis, +numbness (stable loss of sensation in toes), tingling, myelitis, easy bruising, depression, anxiety, memory problem, h/o thrombosis, h/o FMS, h/o vitamin D deficiency, h/o thyroid disease or blood transfusion.            Past Medical History:     Past Medical History:   Diagnosis Date     Diverticulosis of colon     as noted on colonoscopy     Family history of colon cancer 2012     Family history of colon cancer      Postmenopausal      Past Surgical History:   Procedure Laterality Date     APPENDECTOMY       COLONOSCOPY       GYN SURGERY           HC REMOVAL GALLBLADDER      Cholecystectomy     SURGICAL HISTORY OF -       c- section            Social History:     Social History     Occupational History      [employer] secretary, and PCA      [employer]      Social History Main Topics     Smoking status: Former Smoker     Packs/day: 0.50     Years: 10.00     Types: Cigarettes     Start date: 2015     Quit date: 2008     Smokeless tobacco: Never Used      Comment: smoked age [age], quit x 1 yr and resumed then quit after one yr     Alcohol use Yes      Comment: one beer a day     Drug use: No     Sexual activity: Yes     Partners: Male     Birth control/ protection: Post-menopausal      Comment: Pt's  has a vasectomy          Family History:     Family History    Problem Relation Age of Onset     C.A.D. Mother      50s     Cancer - colorectal Mother       age 64, diagnosed age 54     Circulatory Mother      Coronary Artery Disease Mother      Colon Cancer Mother      C.A.D. Father      Eye Disorder Father      glaucoma     Lipids Father      CANCER Father      kidney     Hyperlipidemia Father      Substance Abuse Brother      Cardiovascular Brother      MI, stent at 52     Connective Tissue Disorder Other      cousin on father side has lupus     Coronary Artery Disease Brother           Allergies:     Allergies   Allergen Reactions     No Known Drug Allergies      Seasonal Allergies           Medications:     Current Outpatient Prescriptions   Medication Sig Dispense Refill     FAMOTIDINE PO Take 20 mg by mouth daily       VITAMIN D, CHOLECALCIFEROL, PO Take by mouth daily       GNP GARLIC EXTRACT PO             Physical Exam:   Blood pressure 158/79, pulse 83, temperature 97.8  F (36.6  C), temperature source Oral, resp. rate 18, weight 69.2 kg (152 lb 9.6 oz), last menstrual period 2004, not currently breastfeeding.  Wt Readings from Last 4 Encounters:   10/11/17 69.2 kg (152 lb 9.6 oz)   17 68 kg (150 lb)   17 67.9 kg (149 lb 9.6 oz)   17 66.7 kg (147 lb)     Constitutional: WD/WN. Pleasant. In no acute distress.  Eyes: EOM intact, PERRLA, sclera anicteric, conj not injected  HEENT: No oral ulcers or thrush. Poor salivary pool.  Neck: No cervical LAP or thyromegaly  Chest: Clear to auscultation bilaterally  CV: RRR, no murmurs/ rubs or gallops. No edema, clubbing or cyanosis. Varicose veins.  GI: Abdomen is soft and non-tender.   MS: No synovitis. Cool joints. No tenderness of the joints. +Heberden nodes. Full ROM of the joints.   Skin: No skin rash, malar rash, livedo, alopecia, digital ulcers. + periungual erythema. Ridging of the nails.  Neuro: A&Ox3. Grossly non focal, muscular power 5/5 upper ane lower Extremities  Psych: NL affect and  mood         Data:     Results for orders placed or performed in visit on 07/12/17   CT Chest w/o Contrast    Narrative    EXAMINATION: Chest CT  7/12/2017     CLINICAL HISTORY: Pulmonary nodule. History of Sjogren syndrome..    COMPARISON: Chest CTs 12/23/2015 and 10/15/2014    TECHNIQUE: CT imaging obtained through the chest without contrast.  Coronal and axial MIP reformatted images obtained.     Dose: 372 mGy*cm    FINDINGS:  The central tracheobronchial tree is patent. There is a new 4 mm solid  pulmonary nodule in the left lung base (series 5 image 259). No air  trapping on expiratory views. Biapical pleural scarring. No focal  consolidation, pleural effusion, or pneumothorax.    3 mm hypoattenuating focus in the left thyroid lobe (series 3 image  4), which appears unchanged from CT in 2014. Heart size is normal. No  pericardial effusion. Ascending aorta and main pulmonary trunk are  normal caliber. No significant atherosclerotic calcifications.  Scattered subcentimeter mediastinal lymph nodes. No enlarged hilar or  axillary lymph nodes.    Bones and soft tissues: No acute fractures or suspicious bony lesions.  Unchanged compression deformities of the midthoracic spine.  Degenerative changes of the midthoracic spine..     Partially imaged upper abdomen: Cholecystectomy clips. Tiny splenule.  Otherwise, visualized upper abdomen is unremarkable.      Impression    IMPRESSION:   New 4 mm solid pulmonary nodule left lung base. Finding is of  uncertain etiology, could be infectious. However recommend a follow-up  CT in 3 months to evaluate for resolution/stability.    I have personally reviewed the examination and initial interpretation  and I agree with the findings.    SKY ASHTON MD       Recent Labs   Lab Test  01/06/17   1441  09/26/16   1627  08/29/14   1707  08/29/14   1706  03/05/14   1619  10/21/13   1428  06/27/13   1451   WBC   --    --    --   7.4  9.0   --   7.4   RBC   --    --    --   4.30   4.25   --   4.38   HGB  12.9  13.2   --   13.3  13.0  14.2  13.4   HCT   --    --    --   40.5  39.3   --   40.4   MCV   --    --    --   94  93   --   92   RDW   --    --    --   13.2  13.0   --   12.8   PLT   --    --    --   231  224   --   211   ALBUMIN   --    --    --   3.9  4.2  4.3  4.0   CRP   --    --   <2.9   --   <5.0  <5.0   --    BUN   --    --    --    --   19  18  17      Recent Labs   Lab Test  10/21/13   1428  06/27/13   1451  06/23/10   0942   TSH  1.24  0.85  0.88     Cyclic Cit Pept IgG/IgA   Date Value Ref Range Status   03/05/2014 <20  Interpretation:  Negative <20 UNITS Final     Hemoglobin   Date Value Ref Range Status   01/06/2017 12.9 11.7 - 15.7 g/dL Final   09/26/2016 13.2 11.7 - 15.7 g/dL Final   08/29/2014 13.3 11.7 - 15.7 g/dL Final     Urea Nitrogen   Date Value Ref Range Status   03/05/2014 19 7 - 30 mg/dL Final   10/21/2013 18 7 - 30 mg/dL Final   06/27/2013 17 7 - 30 mg/dL Final     Sed Rate   Date Value Ref Range Status   08/29/2014 11 0 - 30 mm/h Final   03/05/2014 14 0 - 30 mm/h Final     CRP Inflammation   Date Value Ref Range Status   08/29/2014 <2.9 0.0 - 8.0 mg/L Final   03/05/2014 <5.0 0.0 - 8.0 mg/L Final   10/21/2013 <5.0 0.0 - 8.0 mg/L Final     AST   Date Value Ref Range Status   08/29/2014 19 0 - 45 U/L Final   03/05/2014 24 0 - 45 U/L Final   10/21/2013 48 (H) 0 - 45 U/L Final     Albumin   Date Value Ref Range Status   08/29/2014 3.9 3.3 - 4.9 g/dL Final   03/05/2014 4.2 3.9 - 5.1 g/dL Final   10/21/2013 4.3 3.9 - 5.1 g/dL Final     Comment:     Reference range changed on 09/23/2008.     Alkaline Phosphatase   Date Value Ref Range Status   03/05/2014 82 40 - 150 U/L Final   10/21/2013 83 40 - 150 U/L Final   06/27/2013 82 40 - 150 U/L Final     ALT   Date Value Ref Range Status   08/29/2014 22 0 - 50 U/L Final   03/05/2014 25 0 - 50 U/L Final   10/21/2013 29 0 - 50 U/L Final     Rheumatoid Factor   Date Value Ref Range Status   10/21/2013 11 0 - 14 IU/mL Final      Recent Labs   Lab Test  01/06/17   1441  09/26/16   1627  08/29/14   1706  03/05/14   1619  10/21/13   1428  06/27/13   1451   06/23/10   0942   WBC   --    --   7.4  9.0   --   7.4   < >   --    HGB  12.9  13.2  13.3  13.0  14.2  13.4   < >   --    HCT   --    --   40.5  39.3   --   40.4   < >   --    MCV   --    --   94  93   --   92   < >   --    PLT   --    --   231  224   --   211   < >   --    BUN   --    --    --   19  18  17   --    --    TSH   --    --    --    --   1.24  0.85   --   0.88   AST   --    --   19  24  48*  27   < >   --    ALT   --    --   22  25  29  23   < >   --    ALKPHOS   --    --    --   82  83  82   < >   --     < > = values in this interval not displayed.       Reviewed Rheumatology lab flowsheet      Acacia Chandler MD

## 2017-10-11 NOTE — NURSING NOTE
Sejal Devlin      1.  Has the patient received the information for the influenza vaccine? YES    2.  Does the patient have any of the following contraindications?     Allergy to eggs? No     Allergic reaction to previous influenza vaccines? No     Any other problems to previous influenza vaccines? No     Paralyzed by Guillain-Nashua syndrome? No     Currently pregnant? NO     Current moderate or severe illness? No     Allergy to contact lens solution? No    3.  The vaccine has been administered in the usual fashion and the patient was instructed to wait 20 minutes before leaving the building in the event of an allergic reaction: YES    Vaccination given by Mary Gerardo CMA.  Recorded by Mary Gerardo

## 2017-10-12 DIAGNOSIS — M35.00 SJOGREN'S SYNDROME, WITH UNSPECIFIED ORGAN INVOLVEMENT (H): ICD-10-CM

## 2017-10-12 LAB
ALBUMIN UR-MCNC: NEGATIVE MG/DL
APPEARANCE UR: CLEAR
BASOPHILS # BLD AUTO: 0 10E9/L (ref 0–0.2)
BASOPHILS NFR BLD AUTO: 0.5 %
BILIRUB UR QL STRIP: NEGATIVE
COLOR UR AUTO: YELLOW
DIFFERENTIAL METHOD BLD: NORMAL
EOSINOPHIL # BLD AUTO: 0.3 10E9/L (ref 0–0.7)
EOSINOPHIL NFR BLD AUTO: 3.2 %
ERYTHROCYTE [DISTWIDTH] IN BLOOD BY AUTOMATED COUNT: 13 % (ref 10–15)
GLUCOSE UR STRIP-MCNC: NEGATIVE MG/DL
HCT VFR BLD AUTO: 42.1 % (ref 35–47)
HGB BLD-MCNC: 13.3 G/DL (ref 11.7–15.7)
HGB UR QL STRIP: NEGATIVE
KETONES UR STRIP-MCNC: NEGATIVE MG/DL
LEUKOCYTE ESTERASE UR QL STRIP: ABNORMAL
LYMPHOCYTES # BLD AUTO: 2.4 10E9/L (ref 0.8–5.3)
LYMPHOCYTES NFR BLD AUTO: 28.6 %
MCH RBC QN AUTO: 30.8 PG (ref 26.5–33)
MCHC RBC AUTO-ENTMCNC: 31.6 G/DL (ref 31.5–36.5)
MCV RBC AUTO: 98 FL (ref 78–100)
MONOCYTES # BLD AUTO: 1 10E9/L (ref 0–1.3)
MONOCYTES NFR BLD AUTO: 12 %
NEUTROPHILS # BLD AUTO: 4.7 10E9/L (ref 1.6–8.3)
NEUTROPHILS NFR BLD AUTO: 55.7 %
NITRATE UR QL: NEGATIVE
NON-SQ EPI CELLS #/AREA URNS LPF: ABNORMAL /LPF
PH UR STRIP: 7 PH (ref 5–7)
PLATELET # BLD AUTO: 228 10E9/L (ref 150–450)
RBC # BLD AUTO: 4.32 10E12/L (ref 3.8–5.2)
RBC #/AREA URNS AUTO: ABNORMAL /HPF
SOURCE: ABNORMAL
SP GR UR STRIP: 1.01 (ref 1–1.03)
UROBILINOGEN UR STRIP-ACNC: 0.2 EU/DL (ref 0.2–1)
WBC # BLD AUTO: 8.5 10E9/L (ref 4–11)
WBC #/AREA URNS AUTO: ABNORMAL /HPF

## 2017-10-12 PROCEDURE — 82565 ASSAY OF CREATININE: CPT | Performed by: FAMILY MEDICINE

## 2017-10-12 PROCEDURE — 36415 COLL VENOUS BLD VENIPUNCTURE: CPT | Performed by: FAMILY MEDICINE

## 2017-10-12 PROCEDURE — 84460 ALANINE AMINO (ALT) (SGPT): CPT | Performed by: FAMILY MEDICINE

## 2017-10-12 PROCEDURE — 81001 URINALYSIS AUTO W/SCOPE: CPT | Performed by: FAMILY MEDICINE

## 2017-10-12 PROCEDURE — 84450 TRANSFERASE (AST) (SGOT): CPT | Performed by: FAMILY MEDICINE

## 2017-10-12 PROCEDURE — 85025 COMPLETE CBC W/AUTO DIFF WBC: CPT | Performed by: FAMILY MEDICINE

## 2017-10-12 NOTE — PROGRESS NOTES
Rheumatology Clinic Visit     Sejal Devlin MRN# [id number]   YOB: 1960 Age: 57 year old     Date of Visit: October 12, 2017  Primary care provider: Marita Freitas          Assessment and Plan:     Assessment:  Seronegative primary Sjogren's syndrome (+lip biopsy 5/2014). Continue kenalog in orabase paste PRN to be used for nasal ulcers. Sicca sx are mild therefore she would like to hold off using evoxac.     Chronic on and off shortness of breath with no ILD on chest CT followed by Pulmonary. We suspect GERD may impart the patient's cough and worsen the patient's shortness of breath, which is supported by her significant improvement on a H2 blocker.  She would seek immediate medical attention for acute shortness of breath or chest pain.      Plan:  - Labs at [facility]: ALT, AST, CBC, UA  - Currently on 20 mg of Famotidine, Trial taper 10 mg for 1 week, prior to discontinuing with follow up with PCP  - Follow up with pulmonary for incidental lung nodule found on chest CT  - Flu shot today  - Rtc in one year    Seen and staffed with Dr. Chandler.    Raimudno Wheatley MD, A  Internal Medicine PGY-2  [facility]  Pager: [phone]      Attending Note: I saw and evaluated the patient with Dr. Wheatley. I agree with the assessment and plan.    Acacia Chandler MD            Active Problem List:     Patient Active Problem List    Diagnosis Date Noted     Sjogren's syndrome (H) 05/24/2017     Priority: Medium     Vitamin D deficiency 09/26/2016     Priority: Medium     Dry mouth 12/03/2013     Priority: Medium     Circulation problem 10/02/2013     Priority: Medium     Rash 06/06/2013     Priority: Medium     Family history of colon cancer 05/22/2012     Priority: Medium     Mother         Hyperlipidemia LDL goal <160 10/31/2010     Priority: Medium     Per provider       Osteopenia 06/23/2010     Priority: Medium            History of Present Illness:   Sejal L  Claus is a 57 year old female who presents for Sjogren's follow up appointment.    03/05/14: HPI from initial visit     Ms. Devlin is a 52 yo WF who was referred to our clinic for evaluation and management of her several complaints including dryness of mouth. She wants to be evaluated for Sjogren's.     In 10/2013, when she was working on her garage sale at [employer], noticed having dryness of mouth which she blamed stress for it. It lasted x 2 weeks. Then sh developed salt/metallic taste in her mouth, chewing gums helps. Reports numbness of toes since last summer. She was diagnose with vaginal atrophy. Reports ear ache. Her joint pain started months ago. Most of her pain is localized to the knees, no joint swelling but has AM stiffness x 10-15 minutes. Reports getting sores in her nose x at least a year. She has tried putting vaseline on them, an dit has helped. Has no sores today. Gums are tender but has no oral ulcers. Has SOB x 2 yr, reports having neg work up for asthma. Reports getting episodes of SOB x 2-3 wk. She is in the middle of episode, gets SOB even at rest. She does not think it's anxiety related. Reports having fatigue, some days are better than others. Some days she wakes up with feeling sick and having flu like sx, but it does not happen often. Had 2-3 episodes of it. Reports itching of skin worse in summer, no visible rash. Itching gets worse in Sun. Reports having earaches lately. Had diarrhea but it's under better control with increasing fibers. She was to the point that she had to wear pads. Feels bloated but gets no belching.     08/29/14:Evoxac was added at last visit. Lip biopsy done in 5/2014 confirmed Dx of primary Sjogren's syndrome.     Complains of vaginal dryness, reports red painful rash under breasts and is using anti-fungal topical agent. Complains of salty taste in her mouth not much dryness (no GERD sx) therefore never tried evoxac. Her major complaint is episodes of SOB without any  "triggers, not related to activity or stress. Currently has no SOB. The episodes are self limited. Reports having these episodes x years with neg work up including NL PFT in the past. Episodes last between few days- up to 2 weeks. Had eye exam and was told having mild dryness of eyes. Dryness of eyes/mouth do not bother her. Has intermittent knee pain without joint swelling. Gets tingling in toes. Nasal ulcers are under control.     09/15/2014:  Yesterday she was feeling more shortness of breath.   Today and this afternoon, she is much better.   No exertional shortness of breath.   No chest pain.      TODAY: The patient reports feeling good, but reports having a long episode of SOB and cough during March and April of this year.  This started with a \"cool\" followed with a persistent cough that \"didn't go away\" also reported bad taste in her mouth during this time.  She was referred to pulmonology with CT chest showing no clear source of symptoms including ILD.  Patient also tired inhalers Albuterol and Advair with no improvement in symptoms. She reported slow improvement in her breathing.  Her symptoms seemed to respond to the addition of an H2 blocker and thinks that this my be the cause of the ongoing cough, which his since resolved.  Patient reports being on a stable dose of 20 mg of Famotidine since August, with no metallic or funny taste in her mouth.      Patient did have an incidental lung nodule picked up on her chest CT, that per the patient was assessed by her pulmonologist with a 1 year follow up Chest CT planned.  Patient is no a current smoker but has significant past smoking use.    Patient continues to have knee pain, that is stable and believes that her sjogren's is well controlled, with some itching over the summer but no rashes.  Patient dose notice her dry mouth, but it doesn't concern her.  Reports stable toe numbness/neuropathy.         Review of Systems:     ROS:  A comprehensive ROS was done, " positives are per HPI.    It is negative for fatigue, weight loss, fever, hair loss, rash, malar rash, discoid rash, photosensitivity, Raynaud's, oral ulcers, nasal ulcers,  Dysphagia, +dry mouth (stable), dry eyes, swollen lymph nodes, chest pain, SOB, cough, h/o serositis, N/V, heartburn, diarrhea, abdominal pain, blood in stool or urine, h/o proteinuria, myalgia, muscle weakness, +joint pain (noted in knees), joint swelling, morning stiffness, headaches, seizures, psychosis, +numbness (stable loss of sensation in toes), tingling, myelitis, easy bruising, depression, anxiety, memory problem, h/o thrombosis, h/o FMS, h/o vitamin D deficiency, h/o thyroid disease or blood transfusion.            Past Medical History:     Past Medical History:   Diagnosis Date     Diverticulosis of colon     as noted on colonoscopy     Family history of colon cancer 2012     Family history of colon cancer      Postmenopausal      Past Surgical History:   Procedure Laterality Date     APPENDECTOMY       COLONOSCOPY       GYN SURGERY           HC REMOVAL GALLBLADDER      Cholecystectomy     SURGICAL HISTORY OF -       c- section            Social History:     Social History     Occupational History      [employer] secretary, and PCA      [employer]      Social History Main Topics     Smoking status: Former Smoker     Packs/day: 0.50     Years: 10.00     Types: Cigarettes     Start date: 2015     Quit date: 2008     Smokeless tobacco: Never Used      Comment: smoked age [age], quit x 1 yr and resumed then quit after one yr     Alcohol use Yes      Comment: one beer a day     Drug use: No     Sexual activity: Yes     Partners: Male     Birth control/ protection: Post-menopausal      Comment: Pt's  has a vasectomy          Family History:     Family History   Problem Relation Age of Onset     C.A.D. Mother      50s     Cancer - colorectal Mother        age 64, diagnosed age 54     Circulatory Mother      Coronary Artery Disease Mother      Colon Cancer Mother      C.A.D. Father      Eye Disorder Father      glaucoma     Lipids Father      CANCER Father      kidney     Hyperlipidemia Father      Substance Abuse Brother      Cardiovascular Brother      MI, stent at 52     Connective Tissue Disorder Other      cousin on father side has lupus     Coronary Artery Disease Brother           Allergies:     Allergies   Allergen Reactions     No Known Drug Allergies      Seasonal Allergies           Medications:     Current Outpatient Prescriptions   Medication Sig Dispense Refill     FAMOTIDINE PO Take 20 mg by mouth daily       VITAMIN D, CHOLECALCIFEROL, PO Take by mouth daily       GNP GARLIC EXTRACT PO             Physical Exam:   Blood pressure 158/79, pulse 83, temperature 97.8  F (36.6  C), temperature source Oral, resp. rate 18, weight 69.2 kg (152 lb 9.6 oz), last menstrual period 2004, not currently breastfeeding.  Wt Readings from Last 4 Encounters:   10/11/17 69.2 kg (152 lb 9.6 oz)   17 68 kg (150 lb)   17 67.9 kg (149 lb 9.6 oz)   17 66.7 kg (147 lb)     Constitutional: WD/WN. Pleasant. In no acute distress.  Eyes: EOM intact, PERRLA, sclera anicteric, conj not injected  HEENT: No oral ulcers or thrush. Poor salivary pool.  Neck: No cervical LAP or thyromegaly  Chest: Clear to auscultation bilaterally  CV: RRR, no murmurs/ rubs or gallops. No edema, clubbing or cyanosis. Varicose veins.  GI: Abdomen is soft and non-tender.   MS: No synovitis. Cool joints. No tenderness of the joints. +Heberden nodes. Full ROM of the joints.   Skin: No skin rash, malar rash, livedo, alopecia, digital ulcers. + periungual erythema. Ridging of the nails.  Neuro: A&Ox3. Grossly non focal, muscular power 5/5 upper ane lower Extremities  Psych: NL affect and mood         Data:     Results for orders placed or performed in visit on 17   CT  Chest w/o Contrast    Narrative    EXAMINATION: Chest CT  7/12/2017     CLINICAL HISTORY: Pulmonary nodule. History of Sjogren syndrome..    COMPARISON: Chest CTs 12/23/2015 and 10/15/2014    TECHNIQUE: CT imaging obtained through the chest without contrast.  Coronal and axial MIP reformatted images obtained.     Dose: 372 mGy*cm    FINDINGS:  The central tracheobronchial tree is patent. There is a new 4 mm solid  pulmonary nodule in the left lung base (series 5 image 259). No air  trapping on expiratory views. Biapical pleural scarring. No focal  consolidation, pleural effusion, or pneumothorax.    3 mm hypoattenuating focus in the left thyroid lobe (series 3 image  4), which appears unchanged from CT in 2014. Heart size is normal. No  pericardial effusion. Ascending aorta and main pulmonary trunk are  normal caliber. No significant atherosclerotic calcifications.  Scattered subcentimeter mediastinal lymph nodes. No enlarged hilar or  axillary lymph nodes.    Bones and soft tissues: No acute fractures or suspicious bony lesions.  Unchanged compression deformities of the midthoracic spine.  Degenerative changes of the midthoracic spine..     Partially imaged upper abdomen: Cholecystectomy clips. Tiny splenule.  Otherwise, visualized upper abdomen is unremarkable.      Impression    IMPRESSION:   New 4 mm solid pulmonary nodule left lung base. Finding is of  uncertain etiology, could be infectious. However recommend a follow-up  CT in 3 months to evaluate for resolution/stability.    I have personally reviewed the examination and initial interpretation  and I agree with the findings.    SKY ASHTON MD       Recent Labs   Lab Test  01/06/17   1441  09/26/16   1627  08/29/14   1707  08/29/14   1706  03/05/14   1619  10/21/13   1428  06/27/13   1451   WBC   --    --    --   7.4  9.0   --   7.4   RBC   --    --    --   4.30  4.25   --   4.38   HGB  12.9  13.2   --   13.3  13.0  14.2  13.4   HCT   --    --    --    40.5  39.3   --   40.4   MCV   --    --    --   94  93   --   92   RDW   --    --    --   13.2  13.0   --   12.8   PLT   --    --    --   231  224   --   211   ALBUMIN   --    --    --   3.9  4.2  4.3  4.0   CRP   --    --   <2.9   --   <5.0  <5.0   --    BUN   --    --    --    --   19  18  17      Recent Labs   Lab Test  10/21/13   1428  06/27/13   1451  06/23/10   0942   TSH  1.24  0.85  0.88     Cyclic Cit Pept IgG/IgA   Date Value Ref Range Status   03/05/2014 <20  Interpretation:  Negative <20 UNITS Final     Hemoglobin   Date Value Ref Range Status   01/06/2017 12.9 11.7 - 15.7 g/dL Final   09/26/2016 13.2 11.7 - 15.7 g/dL Final   08/29/2014 13.3 11.7 - 15.7 g/dL Final     Urea Nitrogen   Date Value Ref Range Status   03/05/2014 19 7 - 30 mg/dL Final   10/21/2013 18 7 - 30 mg/dL Final   06/27/2013 17 7 - 30 mg/dL Final     Sed Rate   Date Value Ref Range Status   08/29/2014 11 0 - 30 mm/h Final   03/05/2014 14 0 - 30 mm/h Final     CRP Inflammation   Date Value Ref Range Status   08/29/2014 <2.9 0.0 - 8.0 mg/L Final   03/05/2014 <5.0 0.0 - 8.0 mg/L Final   10/21/2013 <5.0 0.0 - 8.0 mg/L Final     AST   Date Value Ref Range Status   08/29/2014 19 0 - 45 U/L Final   03/05/2014 24 0 - 45 U/L Final   10/21/2013 48 (H) 0 - 45 U/L Final     Albumin   Date Value Ref Range Status   08/29/2014 3.9 3.3 - 4.9 g/dL Final   03/05/2014 4.2 3.9 - 5.1 g/dL Final   10/21/2013 4.3 3.9 - 5.1 g/dL Final     Comment:     Reference range changed on 09/23/2008.     Alkaline Phosphatase   Date Value Ref Range Status   03/05/2014 82 40 - 150 U/L Final   10/21/2013 83 40 - 150 U/L Final   06/27/2013 82 40 - 150 U/L Final     ALT   Date Value Ref Range Status   08/29/2014 22 0 - 50 U/L Final   03/05/2014 25 0 - 50 U/L Final   10/21/2013 29 0 - 50 U/L Final     Rheumatoid Factor   Date Value Ref Range Status   10/21/2013 11 0 - 14 IU/mL Final     Recent Labs   Lab Test  01/06/17   1441  09/26/16   1627  08/29/14   1706  03/05/14   1619   10/21/13   1428  06/27/13   1451   06/23/10   0942   WBC   --    --   7.4  9.0   --   7.4   < >   --    HGB  12.9  13.2  13.3  13.0  14.2  13.4   < >   --    HCT   --    --   40.5  39.3   --   40.4   < >   --    MCV   --    --   94  93   --   92   < >   --    PLT   --    --   231  224   --   211   < >   --    BUN   --    --    --   19  18  17   --    --    TSH   --    --    --    --   1.24  0.85   --   0.88   AST   --    --   19  24  48*  27   < >   --    ALT   --    --   22  25  29  23   < >   --    ALKPHOS   --    --    --   82  83  82   < >   --     < > = values in this interval not displayed.       Reviewed Rheumatology lab flowsheet

## 2017-10-12 NOTE — LETTER
Patient:  Sejal Devlin  :   1960  MRN:     [id number]        Ms.Joanne CIPRIANO Devlin  [address] [place] [address]        2017    Dear ,    We are writing to inform you of your test results. They are stable.      Resulted Orders   AST   Result Value Ref Range    AST 18 0 - 45 U/L   ALT   Result Value Ref Range    ALT 23 0 - 50 U/L   CBC with platelets differential   Result Value Ref Range    WBC 8.5 4.0 - 11.0 10e9/L    RBC Count 4.32 3.8 - 5.2 10e12/L    Hemoglobin 13.3 11.7 - 15.7 g/dL    Hematocrit 42.1 35.0 - 47.0 %    MCV 98 78 - 100 fl    MCH 30.8 26.5 - 33.0 pg    MCHC 31.6 31.5 - 36.5 g/dL    RDW 13.0 10.0 - 15.0 %    Platelet Count 228 150 - 450 10e9/L    Diff Method Automated Method     % Neutrophils 55.7 %    % Lymphocytes 28.6 %    % Monocytes 12.0 %    % Eosinophils 3.2 %    % Basophils 0.5 %    Absolute Neutrophil 4.7 1.6 - 8.3 10e9/L    Absolute Lymphocytes 2.4 0.8 - 5.3 10e9/L    Absolute Monocytes 1.0 0.0 - 1.3 10e9/L    Absolute Eosinophils 0.3 0.0 - 0.7 10e9/L    Absolute Basophils 0.0 0.0 - 0.2 10e9/L   Creatinine   Result Value Ref Range    Creatinine 0.85 0.52 - 1.04 mg/dL    GFR Estimate 69 >60 mL/min/1.7m2      Comment:      Non  GFR Calc    GFR Estimate If Black 83 >60 mL/min/1.7m2      Comment:       GFR Calc   UA with Microscopic reflex to Culture   Result Value Ref Range    Color Urine Yellow     Appearance Urine Clear     Glucose Urine Negative NEG^Negative mg/dL    Bilirubin Urine Negative NEG^Negative    Ketones Urine Negative NEG^Negative mg/dL    Specific Gravity Urine 1.015 1.003 - 1.035    pH Urine 7.0 5.0 - 7.0 pH    Protein Albumin Urine Negative NEG^Negative mg/dL    Urobilinogen Urine 0.2 0.2 - 1.0 EU/dL    Nitrite Urine Negative NEG^Negative    Blood Urine Negative NEG^Negative    Leukocyte Esterase Urine Trace (A) NEG^Negative    Source Midstream Urine     WBC Urine O - 2 OTO2^O - 2 /HPF    RBC Urine O - 2  OTO2^O - 2 /HPF    Squamous Epithelial /LPF Urine Few FEW^Few /LPF       Parastoo Geraldine BURKS

## 2017-10-13 LAB
ALT SERPL W P-5'-P-CCNC: 23 U/L (ref 0–50)
AST SERPL W P-5'-P-CCNC: 18 U/L (ref 0–45)
CREAT SERPL-MCNC: 0.85 MG/DL (ref 0.52–1.04)
GFR SERPL CREATININE-BSD FRML MDRD: 69 ML/MIN/1.7M2

## 2018-01-18 ENCOUNTER — OFFICE VISIT (OUTPATIENT)
Dept: FAMILY MEDICINE | Facility: CLINIC | Age: 58
End: 2018-01-18
Payer: COMMERCIAL

## 2018-01-18 ENCOUNTER — RADIANT APPOINTMENT (OUTPATIENT)
Dept: GENERAL RADIOLOGY | Facility: CLINIC | Age: 58
End: 2018-01-18
Attending: FAMILY MEDICINE
Payer: COMMERCIAL

## 2018-01-18 VITALS
HEIGHT: 63 IN | BODY MASS INDEX: 26.75 KG/M2 | HEART RATE: 86 BPM | OXYGEN SATURATION: 97 % | DIASTOLIC BLOOD PRESSURE: 87 MMHG | WEIGHT: 151 LBS | RESPIRATION RATE: 20 BRPM | SYSTOLIC BLOOD PRESSURE: 138 MMHG | TEMPERATURE: 97.8 F

## 2018-01-18 DIAGNOSIS — M54.2 CERVICALGIA: ICD-10-CM

## 2018-01-18 DIAGNOSIS — M54.2 CERVICALGIA: Primary | ICD-10-CM

## 2018-01-18 PROCEDURE — 72040 X-RAY EXAM NECK SPINE 2-3 VW: CPT

## 2018-01-18 PROCEDURE — 96372 THER/PROPH/DIAG INJ SC/IM: CPT | Performed by: FAMILY MEDICINE

## 2018-01-18 PROCEDURE — 99213 OFFICE O/P EST LOW 20 MIN: CPT | Mod: 25 | Performed by: FAMILY MEDICINE

## 2018-01-18 RX ORDER — CYCLOBENZAPRINE HCL 10 MG
5-10 TABLET ORAL 3 TIMES DAILY PRN
Qty: 30 TABLET | Refills: 1 | Status: SHIPPED | OUTPATIENT
Start: 2018-01-18 | End: 2018-11-15

## 2018-01-18 RX ORDER — KETOROLAC TROMETHAMINE 30 MG/ML
30 INJECTION, SOLUTION INTRAMUSCULAR; INTRAVENOUS ONCE
Qty: 1 ML | Refills: 0 | OUTPATIENT
Start: 2018-01-18 | End: 2018-01-18

## 2018-01-18 NOTE — NURSING NOTE
The following medication was given:     MEDICATION: Ketorolac Tromethamine 60MG/2ML (30 mg/mL) (Toradol)  ROUTE: IM  SITE: Deltoid - Left  DOSE: 30 MG  LOT #: [id number]  :  Satiety  EXPIRATION DATE:  07/2019  NDC#: 91023-619-74  Verified by DIANA Govea MA

## 2018-01-18 NOTE — PROGRESS NOTES
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   Aggravating factors include: hurts not matter what pt do.     Therapies tried and outcome: massage--helps for about an hour;  chiropractor didn't help, NSAIDs haven't helped; will have a glass or two of wine at bedtime to try and relax and get to sleep.  Ice/heat has not helped.       Right neck/shoulder pain: started about a week ago, after she had been lifting some files; she describes that she was bending over but extending her neck to look up.  There was no particular injury, but the pain started after this.  She has had this same pain in the past, but it would go away on its own after a couple of days.  This time, the pain is lasting.  It is located at the right occiput (points to insertion of trap) and radiates down the trapezius.  No pain, numbness or tingling in her arm.  Some headache.  She is asking about an x-ray and pain relief.          Problem list and histories reviewed & adjusted, as indicated.  Additional history: as documented    Patient Active Problem List   Diagnosis     Osteopenia     Hyperlipidemia LDL goal <160     Family history of colon cancer     Rash     Circulation problem     Dry mouth     Vitamin D deficiency     Sjogren's syndrome (H)     Past Surgical History:   Procedure Laterality Date     APPENDECTOMY  1977     COLONOSCOPY       GYN SURGERY           HC REMOVAL GALLBLADDER  1998     Cholecystectomy     ORTHOPEDIC SURGERY  2016    Broken right radius     SURGICAL HISTORY OF -       c- section       Social History   Substance Use Topics     Smoking status: Former Smoker     Packs/day: 1.00     Years: 10.00     Types: Cigarettes     Start date: 2015     Quit date: 2008     Smokeless tobacco: Never Used      Comment: smoked age [age], quit x 1 yr and resumed then quit after one yr     Alcohol use Yes      Comment: one beer a day     Family History   Problem Relation Age of Onset     C.A.D. Mother      50s     Cancer - colorectal Mother       age 64, diagnosed age 54     Circulatory Mother      Coronary Artery Disease Mother      Colon Cancer Mother      C.A.D. Father      Eye Disorder Father      glaucoma     Lipids Father      CANCER Father      kidney     Hyperlipidemia Father      Substance Abuse Brother      Cardiovascular Brother      MI, stent at 52     Connective Tissue Disorder Other      cousin on father side has lupus     Coronary Artery Disease Brother      Coronary Artery Disease Brother      Substance Abuse Brother          Current Outpatient Prescriptions   Medication Sig Dispense Refill     ketorolac (TORADOL) 30 MG/ML injection Inject 1 mL (30 mg) into the muscle once for 1 dose 1 mL 0     cyclobenzaprine (FLEXERIL) 10 MG tablet Take 0.5-1 tablets (5-10 mg) by mouth 3 times daily as needed for muscle spasms 30 tablet 1     acetaminophen-codeine (TYLENOL #3) 300-30 MG per tablet Take 1 tablet by mouth every 8 hours as needed for pain maximum 2 tablet(s) per day 10 tablet 0     FAMOTIDINE PO Take 20 mg by mouth daily       VITAMIN D, CHOLECALCIFEROL, PO Take by mouth daily       GNP GARLIC EXTRACT PO        Allergies   Allergen Reactions     No Known Drug Allergies      Seasonal Allergies          Reviewed and updated as needed this visit by clinical staffTobacco  Allergies  Meds  Med Hx  Surg Hx  Fam Hx  Soc Hx      Reviewed and updated as needed this visit by  "Provider         ROS:  Constitutional, HEENT, cardiovascular, pulmonary, gi and gu systems are negative, except as otherwise noted.      OBJECTIVE:   /87 (BP Location: Right arm, Patient Position: Sitting, Cuff Size: Adult Regular)  Pulse 86  Temp 97.8  F (36.6  C) (Oral)  Resp 20  Ht 5' 2.5\" (1.588 m)  Wt 151 lb (68.5 kg)  LMP 12/18/2004  SpO2 97%  BMI 27.18 kg/m2  Body mass index is 27.18 kg/(m^2).  GENERAL: healthy, alert and no distress  EYES: Eyes grossly normal to inspection, PERRL and conjunctivae and sclerae normal  NECK: no adenopathy, no asymmetry, masses, or scars and thyroid normal to palpation  RESP: lungs clear to auscultation - no rales, rhonchi or wheezes  CV: regular rate and rhythm, normal S1 S2, no S3 or S4, no murmur, click or rub, no peripheral edema and peripheral pulses strong  MS: tender with muscle spasm at the insertion of the trapezius at the right occiput and some tenderness over the trapezius.  Right upper extremity with normal ROM, strength and DTRs.  ROM of the neck is fairly full, a little stiff, and painful with neck extension  SKIN: no suspicious lesions or rashes  NEURO: Normal strength and tone, mentation intact and speech normal  PSYCH: mentation appears normal, affect normal/bright    Diagnostic Test Results:  X-ray c-spine pending    ASSESSMENT/PLAN:   Cervicalgia/trazpezius strain: discussed that she may need physical therapy to really address this, but will try a dose of toradol today in clinic, rx also done for flexeril, and 5 tabs of tylenol #3 in case needed for sleep (advised not to mix with alcohol).  Checking x-ray.  If pain is persistent, I've asked her to send a mychart message or come in; I would refer her to physical therapy and ortho consult.  -toradol 30mg--reviewed recent normal creatinine, discussed potential nephrotoxicity and GI upset.    Jackie Joshua MD  [facility]    "

## 2018-01-18 NOTE — PATIENT INSTRUCTIONS
Toradol shot--done in clinic today.  This is a strong anti-inflammatory.  Do not take ibuprofen, aleve, advil, excedrin, etc for the rest of the day today, as they work the same way and can harm stomach and kidneys.  You may take tylenol 1000mg three times daily if you wish.  I would suggest trying the muscle relaxant after work today.  You can take 1-2 tabs of the flexeril up to three times daily.  Don't drive/work when you take it until you know how it is going to affect you.  For really severe pain or night time pain, you may take the tylenol #3.  Don't mix this one with alcohol.  If you are taking tylenol otherwise, make sure you are not taking more than 4000mg total in a single day.      If things are not improving on their own, then we should start physical therapy and have you see an orthopedist.  It is ok to either schedule a follow up visit in clinic, or you can just send me a mychart note if there's nothing new going on and you just need the referrals.     I'll let you know the x-ray results when they are available.

## 2018-01-18 NOTE — MR AVS SNAPSHOT
After Visit Summary   1/18/2018    Sejal Devlin    MRN: [id number]           Patient Information     Date Of Birth          1960        Visit Information        Provider Department      1/18/2018 10:20 AM Jackie Joshua MD [facility]        Today's Diagnoses     Cervicalgia    -  1      Care Instructions    Toradol shot--done in clinic today.  This is a strong anti-inflammatory.  Do not take ibuprofen, aleve, advil, excedrin, etc for the rest of the day today, as they work the same way and can harm stomach and kidneys.  You may take tylenol 1000mg three times daily if you wish.  I would suggest trying the muscle relaxant after work today.  You can take 1-2 tabs of the flexeril up to three times daily.  Don't drive/work when you take it until you know how it is going to affect you.  For really severe pain or night time pain, you may take the tylenol #3.  Don't mix this one with alcohol.  If you are taking tylenol otherwise, make sure you are not taking more than 4000mg total in a single day.      If things are not improving on their own, then we should start physical therapy and have you see an orthopedist.  It is ok to either schedule a follow up visit in clinic, or you can just send me a mychart note if there's nothing new going on and you just need the referrals.     I'll let you know the x-ray results when they are available.          Follow-ups after your visit        Your next 10 appointments already scheduled     Oct 11, 2018  9:00 AM CDT   (Arrive by 8:45 AM)   Return Visit with Acacia Chandler MD   [facility] ([facility])    [address] [place] [address]  [address]  [facility] [address] [phone]              Future tests that were ordered for you today     Open Future Orders        Priority Expected Expires Ordered    XR Cervical Spine 2/3 Views Routine 1/18/2018 1/18/2019 1/18/2018            Who to contact     If you have  "questions or need follow up information about today's clinic visit or your schedule please contact [facility] directly at [phone].  Normal or non-critical lab and imaging results will be communicated to you by [employer], letter or phone within 4 business days after the clinic has received the results. If you do not hear from us within 7 days, please contact the clinic through [employer] or phone. If you have a critical or abnormal lab result, we will notify you by phone as soon as possible.  Submit refill requests through [employer] or call your pharmacy and they will forward the refill request to us. Please allow 3 business days for your refill to be completed.          Additional Information About Your Visit        [employer] Information     [employer] gives you secure access to your electronic health record. If you see a primary care provider, you can also send messages to your care team and make appointments. If you have questions, please call your primary care clinic.  If you do not have a primary care provider, please call [phone] and they will assist you.        Care EveryWhere ID     This is your Care EveryWhere ID. This could be used by other organizations to access your [place] medical records  [id number]        Your Vitals Were     Pulse Temperature Respirations Height Last Period Pulse Oximetry    86 97.8  F (36.6  C) (Oral) 20 5' 2.5\" (1.588 m) 12/18/2004 97%    BMI (Body Mass Index)                   27.18 kg/m2            Blood Pressure from Last 3 Encounters:   01/18/18 138/87   10/11/17 158/79   08/01/17 123/78    Weight from Last 3 Encounters:   01/18/18 151 lb (68.5 kg)   10/11/17 152 lb 9.6 oz (69.2 kg)   08/01/17 150 lb (68 kg)                 Today's Medication Changes          These changes are accurate as of: 1/18/18 10:41 AM.  If you have any questions, ask your nurse or doctor.               Start taking these medicines.        Dose/Directions    acetaminophen-codeine " 300-30 MG per tablet   Commonly known as:  TYLENOL #3   Used for:  Cervicalgia   Started by:  Jackie Joshua MD        Dose:  1 tablet   Take 1 tablet by mouth every 8 hours as needed for pain maximum 2 tablet(s) per day   Quantity:  10 tablet   Refills:  0       cyclobenzaprine 10 MG tablet   Commonly known as:  FLEXERIL   Used for:  Cervicalgia   Started by:  Jackie Joshua MD        Dose:  5-10 mg   Take 0.5-1 tablets (5-10 mg) by mouth 3 times daily as needed for muscle spasms   Quantity:  30 tablet   Refills:  1       ketorolac 30 MG/ML injection   Commonly known as:  TORADOL   Used for:  Cervicalgia   Started by:  Jackie Joshua MD        Dose:  30 mg   Inject 1 mL (30 mg) into the muscle once for 1 dose   Quantity:  1 mL   Refills:  0            Where to get your medicines      These medications were sent to [id number] - [place], [place] - [address] AT [facility]  [address], [place] [address]     Phone:  [phone]     cyclobenzaprine 10 MG tablet         Some of these will need a paper prescription and others can be bought over the counter.  Ask your nurse if you have questions.     Bring a paper prescription for each of these medications     acetaminophen-codeine 300-30 MG per tablet       You don't need a prescription for these medications     ketorolac 30 MG/ML injection                Primary Care Provider Office Phone # Fax #    JONATAN Ramírez [facility] [phone] [address] [place] [address]        Equal Access to Services     [facility]: Hadii thiago ku hadasho Solawrence, waaxda luqadaha, qaybta kaalmada adeegyada, carly myers. So [facility] [phone].    ATENCIÓN: Si habla español, tiene a badillo disposición servicios gratuitos de asistencia lingüística. Llame al [phone].    We comply with applicable federal civil rights laws and [place] laws. We do not discriminate on the  basis of race, color, national origin, age, disability, sex, sexual orientation, or gender identity.            Thank you!     Thank you for choosing [facility]  for your care. Our goal is always to provide you with excellent care. Hearing back from our patients is one way we can continue to improve our services. Please take a few minutes to complete the written survey that you may receive in the mail after your visit with us. Thank you!             Your Updated Medication List - Protect others around you: Learn how to safely use, store and throw away your medicines at www.disposemymeds.org.          This list is accurate as of: 1/18/18 10:41 AM.  Always use your most recent med list.                   Brand Name Dispense Instructions for use Diagnosis    acetaminophen-codeine 300-30 MG per tablet    TYLENOL #3    10 tablet    Take 1 tablet by mouth every 8 hours as needed for pain maximum 2 tablet(s) per day    Cervicalgia       cyclobenzaprine 10 MG tablet    FLEXERIL    30 tablet    Take 0.5-1 tablets (5-10 mg) by mouth 3 times daily as needed for muscle spasms    Cervicalgia       FAMOTIDINE PO      Take 20 mg by mouth daily        GNP GARLIC EXTRACT PO           ketorolac 30 MG/ML injection    TORADOL    1 mL    Inject 1 mL (30 mg) into the muscle once for 1 dose    Cervicalgia       VITAMIN D (CHOLECALCIFEROL) PO      Take by mouth daily

## 2018-01-29 ENCOUNTER — OFFICE VISIT (OUTPATIENT)
Dept: FAMILY MEDICINE | Facility: CLINIC | Age: 58
End: 2018-01-29
Payer: COMMERCIAL

## 2018-01-29 VITALS
TEMPERATURE: 97.3 F | HEART RATE: 79 BPM | RESPIRATION RATE: 20 BRPM | SYSTOLIC BLOOD PRESSURE: 123 MMHG | DIASTOLIC BLOOD PRESSURE: 80 MMHG | WEIGHT: 152.8 LBS | BODY MASS INDEX: 27.5 KG/M2

## 2018-01-29 DIAGNOSIS — L01.00 IMPETIGO: Primary | ICD-10-CM

## 2018-01-29 PROCEDURE — 99213 OFFICE O/P EST LOW 20 MIN: CPT | Performed by: FAMILY MEDICINE

## 2018-01-29 RX ORDER — MUPIROCIN 20 MG/G
OINTMENT TOPICAL 3 TIMES DAILY
Qty: 22 G | Refills: 1 | Status: SHIPPED | OUTPATIENT
Start: 2018-01-29 | End: 2018-02-03

## 2018-01-29 NOTE — MR AVS SNAPSHOT
After Visit Summary   1/29/2018    Sejal Devlin    MRN: [id number]           Patient Information     Date Of Birth          1960        Visit Information        Provider Department      1/29/2018 4:30 PM Raghav Duran MD [facility]        Today's Diagnoses     Impetigo    -  1      Care Instructions    Mupirocin 3-4 times a day for 5 days.   If mupirocin is not effect, switch to bacitracin OTC after 2 days.          Follow-ups after your visit        Your next 10 appointments already scheduled     Oct 11, 2018  9:00 AM CDT   (Arrive by 8:45 AM)   Return Visit with Acacia Chandler MD   [facility] ([facility])    [address] [facility] [address]  [address]  [facility] [address] [phone]              Who to contact     If you have questions or need follow up information about today's clinic visit or your schedule please contact [facility] directly at [phone].  Normal or non-critical lab and imaging results will be communicated to you by [employer], letter or phone within 4 business days after the clinic has received the results. If you do not hear from us within 7 days, please contact the clinic through [employer] or phone. If you have a critical or abnormal lab result, we will notify you by phone as soon as possible.  Submit refill requests through [employer] or call your pharmacy and they will forward the refill request to us. Please allow 3 business days for your refill to be completed.          Additional Information About Your Visit        [employer] Information     [employer] gives you secure access to your electronic health record. If you see a primary care provider, you can also send messages to your care team and make appointments. If you have questions, please call your primary care clinic.  If you do not have a primary care provider, please call [phone] and they will assist you.        Care EveryWhere  ID     This is your Care EveryWhere ID. This could be used by other organizations to access your [place] medical records  [id number]        Your Vitals Were     Pulse Temperature Respirations Last Period BMI (Body Mass Index)       79 97.3  F (36.3  C) (Oral) 20 12/18/2004 27.5 kg/m2        Blood Pressure from Last 3 Encounters:   01/29/18 123/80   01/18/18 138/87   10/11/17 158/79    Weight from Last 3 Encounters:   01/29/18 152 lb 12.8 oz (69.3 kg)   01/18/18 151 lb (68.5 kg)   10/11/17 152 lb 9.6 oz (69.2 kg)              Today, you had the following     No orders found for display         Today's Medication Changes          These changes are accurate as of 1/29/18  4:59 PM.  If you have any questions, ask your nurse or doctor.               Start taking these medicines.        Dose/Directions    mupirocin 2 % ointment   Commonly known as:  BACTROBAN   Used for:  Impetigo   Started by:  Raghav Duran MD        Apply topically 3 times daily for 5 days   Quantity:  22 g   Refills:  1            Where to get your medicines      These medications were sent to [facility] - [place], [place] - [address] [facility]  [address], [place] [address]     Phone:  [phone]     mupirocin 2 % ointment                Primary Care Provider Office Phone # Fax #    JONATAN Ramírez [phone]       [address] [place] [address]        Equal Access to Services     [facility]: Hadii thiago ibarra hadasho Soomaali, waaxda luqadaha, qaybta kaalmada adeegyadarian, carly veliz . So [facility] [phone].    ATENCIÓN: Si habla español, tiene a badillo disposición servicios gratuitos de asistencia lingüística. Llame al [phone].    We comply with applicable federal civil rights laws and [place] laws. We do not discriminate on the basis of race, color, national origin, age, disability, sex, sexual orientation, or gender identity.            Thank  you!     Thank you for choosing [facility]  for your care. Our goal is always to provide you with excellent care. Hearing back from our patients is one way we can continue to improve our services. Please take a few minutes to complete the written survey that you may receive in the mail after your visit with us. Thank you!             Your Updated Medication List - Protect others around you: Learn how to safely use, store and throw away your medicines at www.disposemymeds.org.          This list is accurate as of 1/29/18  4:59 PM.  Always use your most recent med list.                   Brand Name Dispense Instructions for use Diagnosis    acetaminophen-codeine 300-30 MG per tablet    TYLENOL #3    10 tablet    Take 1 tablet by mouth every 8 hours as needed for pain maximum 2 tablet(s) per day    Cervicalgia       cyclobenzaprine 10 MG tablet    FLEXERIL    30 tablet    Take 0.5-1 tablets (5-10 mg) by mouth 3 times daily as needed for muscle spasms    Cervicalgia       FAMOTIDINE PO      Take 20 mg by mouth daily        GNP GARLIC EXTRACT PO           mupirocin 2 % ointment    BACTROBAN    22 g    Apply topically 3 times daily for 5 days    Impetigo       VITAMIN D (CHOLECALCIFEROL) PO      Take by mouth daily

## 2018-01-29 NOTE — PATIENT INSTRUCTIONS
Mupirocin 3-4 times a day for 5 days.   If mupirocin is not effect, switch to bacitracin OTC after 2 days.

## 2018-01-29 NOTE — PROGRESS NOTES
SUBJECTIVE:   Sejal Devlin is a 57 year old female who presents to clinic today for the following health issues:      Rash    Duration: 4 weeks    Description: mouth area   Location:   Itching: moderate    Intensity:  moderate    Accompanying signs and symptoms: None    History (similar episodes/previous evaluation): None    Precipitating or alleviating factors:  New exposures:  None  Recent travel: no      Therapies tried and outcome: hydrocortisone cream      EXAM:  /80  Pulse 79  Temp 97.3  F (36.3  C) (Oral)  Resp 20  Wt 152 lb 12.8 oz (69.3 kg)  LMP 12/18/2004  BMI 27.5 kg/m2  Constitutional: Healthy, alert, no distress   Skin: erythematous perioral rash, no exudate    ASSESSMENT    ICD-10-CM    1. Impetigo L01.00 mupirocin (BACTROBAN) 2 % ointment      Plan:  Patient Instructions   Mupirocin 3-4 times a day for 5 days.   If mupirocin is not effect, switch to bacitracin OTC after 2 days.     Raghav Ward MD  Family Medicine Physician

## 2018-03-19 ENCOUNTER — RADIANT APPOINTMENT (OUTPATIENT)
Dept: GENERAL RADIOLOGY | Facility: CLINIC | Age: 58
End: 2018-03-19
Attending: FAMILY MEDICINE
Payer: COMMERCIAL

## 2018-03-19 ENCOUNTER — OFFICE VISIT (OUTPATIENT)
Dept: FAMILY MEDICINE | Facility: CLINIC | Age: 58
End: 2018-03-19
Payer: COMMERCIAL

## 2018-03-19 VITALS
WEIGHT: 152 LBS | OXYGEN SATURATION: 96 % | HEART RATE: 84 BPM | DIASTOLIC BLOOD PRESSURE: 80 MMHG | SYSTOLIC BLOOD PRESSURE: 128 MMHG | BODY MASS INDEX: 27.36 KG/M2 | TEMPERATURE: 97 F

## 2018-03-19 DIAGNOSIS — R06.02 SOB (SHORTNESS OF BREATH): Primary | ICD-10-CM

## 2018-03-19 DIAGNOSIS — K21.9 GASTROESOPHAGEAL REFLUX DISEASE, ESOPHAGITIS PRESENCE NOT SPECIFIED: ICD-10-CM

## 2018-03-19 DIAGNOSIS — R91.8 PULMONARY NODULES: ICD-10-CM

## 2018-03-19 DIAGNOSIS — R06.02 SOB (SHORTNESS OF BREATH): ICD-10-CM

## 2018-03-19 DIAGNOSIS — R13.10 DYSPHAGIA, UNSPECIFIED TYPE: ICD-10-CM

## 2018-03-19 DIAGNOSIS — R94.31 ABNORMAL ELECTROCARDIOGRAM: ICD-10-CM

## 2018-03-19 PROCEDURE — 71046 X-RAY EXAM CHEST 2 VIEWS: CPT

## 2018-03-19 PROCEDURE — 99214 OFFICE O/P EST MOD 30 MIN: CPT | Performed by: FAMILY MEDICINE

## 2018-03-19 PROCEDURE — 93000 ELECTROCARDIOGRAM COMPLETE: CPT | Performed by: FAMILY MEDICINE

## 2018-03-19 RX ORDER — OMEPRAZOLE 40 MG/1
40 CAPSULE, DELAYED RELEASE ORAL DAILY
Qty: 30 CAPSULE | Refills: 1 | Status: SHIPPED | OUTPATIENT
Start: 2018-03-19 | End: 2018-07-01

## 2018-03-19 NOTE — LETTER
March 26, 2018      Sejal Devlin  [address] [place] [address]        Dear ,    We are writing to inform you of your test results.    The EKG, as we reviewed, was unchanged compared to past EKGs.  I am concerned about your shortness of breath, particularly with exertion.  If taking the omeprazole does not make any difference, then please let me know, and I will set up a stress test to make sure that the shortness of breath is not due to heart disease.       If you have any questions or concerns, please call the clinic at the number listed above.       Sincerely,        Jackie Joshua MD/nr

## 2018-03-19 NOTE — PROGRESS NOTES
"HPI      ROS      Physical Exam      SUBJECTIVE:   Sejal Devlin is a 57 year old female who presents to clinic today for the following health issues:    Concerns:   1. Cough ongoing for months  2. Acid reflux  3. Sensation in chest       1.  GERD: the patient believes that she has been having some acid reflux that has been going on for at least a year.  She endorses a dry cough worse at night, a bad taste in the back of her mouth, and she has a sensation that something is stuck in her esophagus.  She does not choke, but she does feel a sensation (points to mid-sternum).  She has some mild epigastric pain.  She denies nausea, vomiting, change in stools or melena.  She does endorse having some wine on a nightly basis to help her relax and sleep.  She endorses occasional but not daily NSAID use.  She has about 2 cups of coffee in the morning and several cups of tea in the afternoon.  She does snack on chocolates and peppermints.  She enjoys spicy foods and tomato-based foods.  She tends to eat late at night.  She quit smoking in 2008.  Famotidine is on her medication list, but she states she is not taking it. She would like to try a prescription.  No fevers, night sweats or untended weight loss.    2.  SOB: again, going on for \" a long time.\"  She reports that she sometimes has dyspnea that is \"10/10\" and at other times it is lower grade, but it is never absent.  She feels short of breath both at rest and with exertion.  She has experienced shortness of breath at night, but states drinking wine before bed helps.  She endorses a sensation in her mid chest which is described above, but otherwise no chest pain.  She denies dizziness or edema.  She has the occasional dry cough as described above.  No wheezing.  She states she was given inhalers at one point this year but they did not help.  She feels that her symptoms are too constant to represent a panic attack.  She wonders if her SOB is related to reflux.    Shortness " of breath can last for weeks, not worse with exertion.   Never fully gone.  Tried albuterol, didn't help.  or wheeze.  Dry cough cough more at night.  She notes that her brother, who is one year older than she is, had a heart attack last year.          Problem list and histories reviewed & adjusted, as indicated.  Additional history: as documented    Patient Active Problem List   Diagnosis     Osteopenia     Hyperlipidemia LDL goal <160     Family history of colon cancer     Rash     Circulation problem     Dry mouth     Vitamin D deficiency     Sjogren's syndrome (H)     Past Surgical History:   Procedure Laterality Date     APPENDECTOMY       COLONOSCOPY       GYN SURGERY           HC REMOVAL GALLBLADDER      Cholecystectomy     ORTHOPEDIC SURGERY      Broken right radius     SURGICAL HISTORY OF -       c- section       Social History   Substance Use Topics     Smoking status: Former Smoker     Packs/day: 1.00     Years: 10.00     Types: Cigarettes     Start date: 2015     Quit date: 2008     Smokeless tobacco: Never Used      Comment: smoked age [age], quit x 1 yr and resumed then quit after one yr     Alcohol use Yes      Comment: one beer a day     Family History   Problem Relation Age of Onset     C.A.D. Mother      50s     Cancer - colorectal Mother       age 64, diagnosed age 54     Circulatory Mother      Coronary Artery Disease Mother      Colon Cancer Mother      C.A.D. Father      Eye Disorder Father      glaucoma     Lipids Father      CANCER Father      kidney     Hyperlipidemia Father      Substance Abuse Brother      Cardiovascular Brother      MI, stent at 52     Connective Tissue Disorder Other      cousin on father side has lupus     Coronary Artery Disease Brother      Coronary Artery Disease Brother      Substance Abuse Brother          Current Outpatient Prescriptions   Medication Sig Dispense Refill     omeprazole (PRILOSEC) 40 MG capsule Take 1  capsule (40 mg) by mouth daily Take 30-60 minutes before a meal. 30 capsule 1     FAMOTIDINE PO Take 20 mg by mouth daily       VITAMIN D, CHOLECALCIFEROL, PO Take by mouth daily       GNP GARLIC EXTRACT PO        cyclobenzaprine (FLEXERIL) 10 MG tablet Take 0.5-1 tablets (5-10 mg) by mouth 3 times daily as needed for muscle spasms (Patient not taking: Reported on 1/29/2018) 30 tablet 1     acetaminophen-codeine (TYLENOL #3) 300-30 MG per tablet Take 1 tablet by mouth every 8 hours as needed for pain maximum 2 tablet(s) per day (Patient not taking: Reported on 1/29/2018) 10 tablet 0     Allergies   Allergen Reactions     No Known Drug Allergies      Seasonal Allergies        Reviewed and updated as needed this visit by clinical staff  Tobacco  Allergies  Meds  Med Hx  Surg Hx  Fam Hx  Soc Hx      Reviewed and updated as needed this visit by Provider         ROS:  Constitutional, HEENT, cardiovascular, pulmonary, GI, , musculoskeletal, neuro, skin, endocrine and psych systems are negative, except as otherwise noted.    OBJECTIVE:     /80 (BP Location: Right arm, Patient Position: Sitting, Cuff Size: Adult Regular)  Pulse 84  Temp 97  F (36.1  C) (Oral)  Wt 152 lb (68.9 kg)  LMP 12/18/2004  SpO2 96%  BMI 27.36 kg/m2  Body mass index is 27.36 kg/(m^2).  GENERAL: healthy, alert and no distress  EYES: Eyes grossly normal to inspection, PERRL and conjunctivae and sclerae normal  HENT: nose and mouth without ulcers or lesions  NECK: no adenopathy, no asymmetry, masses, or scars and thyroid normal to palpation  RESP: lungs clear to auscultation - no rales, rhonchi or wheezes; normal work of breathing, speaking in full sentences without dyspnea  CV: regular rate and rhythm, normal S1 S2, no S3 or S4, no murmur, click or rub, no peripheral edema and peripheral pulses strong  ABDOMEN: soft, nontender, no hepatosplenomegaly, no masses and bowel sounds normal  MS:  no edema to the BLEs.  NEURO: Normal  strength and tone, mentation intact and speech normal  PSYCH: mentation appears normal, affect normal/bright though become a little emotional when trying to explain her symptoms     Diagnostic Test Results:  EKG: NSR, no q waves, no ST segment changes; flipped T wave  CXR: stable, no infiltrate to my interpretation    ASSESSMENT/PLAN:   Sejal is a 58 yo F with a h/o Sjogren's who presents with possible GERD, a sensation in her chest, and shortness of breath, all of which have been going on for many months or longer.    1.  GERD: we reviewed that she has most of the dietary/lifestyle risk factors other than tobacco use at this point, so I suggested altering food and eating habits as is reasonable.  I will start her on a PPI and re-evaluate in 2 weeks.  I particularly counseled on alcohol and meals right before bed.    2.  Sensation in chest: fairly constant, worse with swallowing so may be related to GERD or Sjogren's.  There may be a possible esophageal abnormality such as stricture or mass or it may represent an issue with esophageal dysmotility.  The symptom is not exertion-related and I do not strongly suspect it is cardiac in etiology.  If this is not improving with PPI and lifestyle changes, I would request a GI consultation for EGD, evaluation of her swallow, and any other follow up testing indicated.  Nothing unusual was visualized in the chest or mediastinum on CXR.  3.  Dyspnea: per records, this has been evaluated with PFTs (normal), CXR (normal) and Chest CT (lung nodule noted).  Per her last pulmonary OV note, her symptoms were apparently improving with H2 blocker; curious that she is no longer taking it.  I considered cardiac etiology, though no htn or edema and a normal cardiovascular exam today; her EKG did show some flipped T waves--however this is stable compared to last year.  I will be checking on her electrolytes for possible potassium derangement and CBC to evaluate for anemia as a possible  cause of dyspnea, particularly given concern for GERD, though she does not seem to have significant fatigue.  Her CXR today appears unchanged.  If her labs are normal, and if her symptoms do not improve with PPI, I will refer her on for echo and/or stress test.    4.  Lung nodule: radiology report advised f/u in 3 months (done in July 2017), th patient states she was told to repeat in 1 year.  Will plan on repeat this summer.    Jackie Joshua MD  [facility]

## 2018-03-19 NOTE — PATIENT INSTRUCTIONS
1.  Start omeprazole 40mg once daily with a meal.  I would recommend you try this for at least 2 weeks.  If it helps, then we can continue it and move down to a lower dose if able.     2.  If the sensation in your chest does not resolve with the omeprazole, then I will refer you on to GI to have EGD (upper endoscopy).      3.  If the shortness of breath does not improve with the above treatment, then I am going to have you do an echocardiogram.     4.  Lung nodule seen July 2017--recommended recheck in 3 months?

## 2018-04-04 ENCOUNTER — OFFICE VISIT (OUTPATIENT)
Dept: FAMILY MEDICINE | Facility: CLINIC | Age: 58
End: 2018-04-04
Payer: COMMERCIAL

## 2018-04-04 VITALS
HEART RATE: 88 BPM | RESPIRATION RATE: 18 BRPM | OXYGEN SATURATION: 97 % | TEMPERATURE: 97.9 F | BODY MASS INDEX: 27.9 KG/M2 | WEIGHT: 155 LBS | SYSTOLIC BLOOD PRESSURE: 129 MMHG | DIASTOLIC BLOOD PRESSURE: 78 MMHG

## 2018-04-04 DIAGNOSIS — R06.09 DYSPNEA ON EXERTION: ICD-10-CM

## 2018-04-04 DIAGNOSIS — K21.9 GASTROESOPHAGEAL REFLUX DISEASE, ESOPHAGITIS PRESENCE NOT SPECIFIED: Primary | ICD-10-CM

## 2018-04-04 PROCEDURE — 99214 OFFICE O/P EST MOD 30 MIN: CPT | Performed by: FAMILY MEDICINE

## 2018-04-04 NOTE — PROGRESS NOTES
HPI      ROS      Physical Exam      SUBJECTIVE:   Sejal Devlin is a 57 year old female who presents to clinic today to follow up on GERD and Dyspnea which we discussed at her last visit on 3/19/18.    1.  GERD: ongoing acid reflux, heartburn, a bad taste in her mouth, and sensation that something is stuck in her esophagus, has not improved on H2 blocker, nor on PPI.  No n/v, no change in stools or melena.    She does endorse having some wine on a nightly basis to help her relax and sleep.  She endorses occasional but not daily NSAID use.  She has about 2 cups of coffee in the morning and several cups of tea in the afternoon.  She does snack on chocolates and peppermints.  She enjoys spicy foods and tomato-based foods.    She would like to have EGD given her persistent symptoms.     2.  Shortness of breath: both at rest and with exertion. She feels short of breath right away when she wakes up.  Playing the drums or other exertional activities increases this.   She denies orthopnea or PND.  No dizziness, chest pain or edema.  Some occasional dry cough that is worse at night.  She has had some shortness of breath at night, but having her nightly glass of wine seems to help.  No wheezing. She had normal PFTs at a pulmonology consultation (see media tab) that were normal. Still, she was tried on albuterol, which did not help.  She has a h/o tobacco use, 10 pk years, quit 2008.  Her brother had a heart attack last year (he is one year older than she is).  She would like a second opinion with another pulmonologist.  She doesn't think she is anxious, but is willing to accept that her symptom could be due to anxiety if no other cause is found, and she asks what she could do about anxiety if that is the case.         Problem list and histories reviewed & adjusted, as indicated.  Additional history: as documented    Patient Active Problem List   Diagnosis     Osteopenia     Hyperlipidemia LDL goal <160     Family history of  colon cancer     Rash     Circulation problem     Dry mouth     Vitamin D deficiency     Sjogren's syndrome (H)     Past Surgical History:   Procedure Laterality Date     APPENDECTOMY  1977     COLONOSCOPY       GYN SURGERY           HC REMOVAL GALLBLADDER  1998    Cholecystectomy     ORTHOPEDIC SURGERY  2016    Broken right radius     SURGICAL HISTORY OF -       c- section       Social History   Substance Use Topics     Smoking status: Former Smoker     Packs/day: 1.00     Years: 10.00     Types: Cigarettes     Start date: 2015     Quit date: 2008     Smokeless tobacco: Never Used      Comment: smoked age [age], quit x 1 yr and resumed then quit after one yr     Alcohol use Yes      Comment: one beer a day     Family History   Problem Relation Age of Onset     C.A.D. Mother      50s     Cancer - colorectal Mother       age 64, diagnosed age 54     Circulatory Mother      Coronary Artery Disease Mother      Colon Cancer Mother      C.A.D. Father      Eye Disorder Father      glaucoma     Lipids Father      CANCER Father      kidney     Hyperlipidemia Father      Substance Abuse Brother      Cardiovascular Brother      MI, stent at 52     Connective Tissue Disorder Other      cousin on father side has lupus     Coronary Artery Disease Brother      Coronary Artery Disease Brother      Substance Abuse Brother          Current Outpatient Prescriptions   Medication Sig Dispense Refill     omeprazole (PRILOSEC) 40 MG capsule Take 1 capsule (40 mg) by mouth daily Take 30-60 minutes before a meal. 30 capsule 1     VITAMIN D, CHOLECALCIFEROL, PO Take by mouth daily       GNP GARLIC EXTRACT PO        cyclobenzaprine (FLEXERIL) 10 MG tablet Take 0.5-1 tablets (5-10 mg) by mouth 3 times daily as needed for muscle spasms (Patient not taking: Reported on 2018) 30 tablet 1     acetaminophen-codeine (TYLENOL #3) 300-30 MG per tablet Take 1 tablet by mouth every 8 hours as needed for pain  maximum 2 tablet(s) per day (Patient not taking: Reported on 1/29/2018) 10 tablet 0     FAMOTIDINE PO Take 20 mg by mouth daily       Allergies   Allergen Reactions     No Known Drug Allergies      Seasonal Allergies        Reviewed and updated as needed this visit by clinical staff  Tobacco  Allergies  Meds  Med Hx  Surg Hx  Fam Hx  Soc Hx      Reviewed and updated as needed this visit by Provider         ROS:  Constitutional, HEENT, cardiovascular, pulmonary, gi and gu systems are negative, except as otherwise noted.    OBJECTIVE:     /78 (BP Location: Right arm, Patient Position: Sitting, Cuff Size: Adult Regular)  Pulse 88  Temp 97.9  F (36.6  C) (Oral)  Resp 18  Wt 155 lb (70.3 kg)  LMP 12/18/2004  SpO2 97%  BMI 27.9 kg/m2  Body mass index is 27.9 kg/(m^2).  GENERAL APPEARANCE: healthy, alert and no distress  EYES: Eyes grossly normal to inspection, PERRL and conjunctivae and sclerae normal  HENT: ear canals and TM's normal and nose and mouth without ulcers or lesions  NECK: no adenopathy, no asymmetry, masses, or scars and thyroid normal to palpation  RESP: lungs clear to auscultation - no rales, rhonchi or ; no evidence of respiratory distress, speaking in full sentences without dyspnea.  CV: regular rates and rhythm, normal S1 S2, no S3 or S4 and no murmur, click or rub  ABDOMEN: soft, nontender, without hepatosplenomegaly or masses and bowel sounds normal  PSYCH: mentation appears normal and affect normal/bright, good eye contact, speech a little pressured.      Diagnostic Test Results:  none     ASSESSMENT/PLAN:     Sejal is a 58 yo F with a h/o osteopenia, GERD, Sjogren's syndrome and dyspnea presents for f/u of GERD and dyspnea that are not improved on PPI.          1. Gastroesophageal reflux disease, esophagitis presence not specified  And sensation of something stuck in her esophagus, not responsive to PPI.  We did discuss  Lifestyle factors that need to be addressed.    Given her  h/o Sjogren's, I have advised EGD.  - GASTROENTEROLOGY ADULT REF PROCEDURE ONLY    2. Dyspnea on exertion  Normal PFTs, CXR and chest CT scan (lung nodule noted July 2017, radiology report advised f/u in 3 months, patient states she was told to repeat in 1 year) in the past, not responsive to GERD treatment, albuterol.  EKG last visit with flipped T waves.  I have recommended a stress echo to evaluate possible cardiac etiology as it seems her pulmonary testing has been normal.  The patient had mentioned that her nightly glass of wine helps; certainly anxiety could be a factor.  I briefly described treatment with therapy and medications for anxiety.   - PULMONARY MEDICINE REFERRAL  - Exercise Stress Echocardiogram; Future        Jackie Joshua MD  [facility]

## 2018-04-04 NOTE — MR AVS SNAPSHOT
After Visit Summary   4/4/2018    Sejal Devlin    MRN: [id number]           Patient Information     Date Of Birth          1960        Visit Information        Provider Department      4/4/2018 3:40 PM Jackie Joshua MD [facility]        Today's Diagnoses     Gastroesophageal reflux disease, esophagitis presence not specified    -  1    Dyspnea on exertion           Follow-ups after your visit        Additional Services     GASTROENTEROLOGY ADULT REF PROCEDURE ONLY       Last Lab Result: Creatinine (mg/dL)       Date                     Value                 10/12/2017               0.85             ----------  Body mass index is 27.9 kg/(m^2).      Patient will be contacted to schedule procedure.     Please be aware that coverage of these services is subject to the terms and limitations of your health insurance plan.  Call member services at your health plan with any benefit or coverage questions.  Any procedures must be performed at a [facility] facility OR coordinated by your clinic's referral office.    Please bring the following with you to your appointment:    (1) Any X-Rays, CTs or MRIs which have been performed.  Contact the facility where they were done to arrange for  prior to your scheduled appointment.    (2) List of current medications   (3) This referral request   (4) Any documents/labs given to you for this referral            PULMONARY MEDICINE REFERRAL       Your provider has referred you to: [facility]: [facility] [place] [phone]   [email or web address]    Please be aware that coverage of these services is subject to the terms and limitations of your health insurance plan.  Call member services at your health plan with any benefit or coverage questions.      Please bring the following with you to your appointment:    (1) Any X-Rays, CTs or MRIs which have been performed.   Contact the facility where they were done to arrange for  prior to your scheduled appointment.    (2) List of current medications   (3) This referral request   (4) Any documents/labs given to you for this referral                  Your next 10 appointments already scheduled     Oct 11, 2018  9:00 AM CDT   (Arrive by 8:45 AM)   Return Visit with Acacia Chandler MD   [facility] ([facility])    [address]  [address]  [facility] [address] [phone]              Future tests that were ordered for you today     Open Future Orders        Priority Expected Expires Ordered    Exercise Stress Echocardiogram Routine  4/4/2019 4/4/2018            Who to contact     If you have questions or need follow up information about today's clinic visit or your schedule please contact [facility] directly at [phone].  Normal or non-critical lab and imaging results will be communicated to you by MyChart, letter or phone within 4 business days after the clinic has received the results. If you do not hear from us within 7 days, please contact the clinic through [employer] or phone. If you have a critical or abnormal lab result, we will notify you by phone as soon as possible.  Submit refill requests through [facility] or call your pharmacy and they will forward the refill request to us. Please allow 3 business days for your refill to be completed.          Additional Information About Your Visit        MyChart Information     [facility] gives you secure access to your electronic health record. If you see a primary care provider, you can also send messages to your care team and make appointments. If you have questions, please call your primary care clinic.  If you do not have a primary care provider, please call [phone] and they will assist you.        Care EveryWhere ID     This is your Care EveryWhere ID. This could be used by other organizations to access  your [facility] medical records  [id number]        Your Vitals Were     Pulse Temperature Respirations Last Period Pulse Oximetry BMI (Body Mass Index)    88 97.9  F (36.6  C) (Oral) 18 12/18/2004 97% 27.9 kg/m2       Blood Pressure from Last 3 Encounters:   04/04/18 129/78   03/19/18 128/80   01/29/18 123/80    Weight from Last 3 Encounters:   04/04/18 155 lb (70.3 kg)   03/19/18 152 lb (68.9 kg)   01/29/18 152 lb 12.8 oz (69.3 kg)              We Performed the Following     GASTROENTEROLOGY ADULT REF PROCEDURE ONLY     PULMONARY MEDICINE REFERRAL        Primary Care Provider Office Phone # Fax #    Marita JONATAN Roa [phone] [address] [place] [address]        Equal Access to Services     ROMMEL LUIS : Hadii aad ku hadasho Soomaali, waaxda luqadaha, qaybta kaalmada adeegyada, waxay rupalin haysorayan lyric veliz . So [facility] [phone].    ATENCIÓN: Si habla español, tiene a badillo disposición servicios gratuitos de asistencia lingüística. Kaneame al [phone].    We comply with applicable federal civil rights laws and [place] laws. We do not discriminate on the basis of race, color, national origin, age, disability, sex, sexual orientation, or gender identity.            Thank you!     Thank you for choosing [facility]  for your care. Our goal is always to provide you with excellent care. Hearing back from our patients is one way we can continue to improve our services. Please take a few minutes to complete the written survey that you may receive in the mail after your visit with us. Thank you!             Your Updated Medication List - Protect others around you: Learn how to safely use, store and throw away your medicines at www.disposemymeds.org.          This list is accurate as of 4/4/18  4:02 PM.  Always use your most recent med list.                   Brand Name Dispense Instructions for use Diagnosis    acetaminophen-codeine  300-30 MG per tablet    TYLENOL #3    10 tablet    Take 1 tablet by mouth every 8 hours as needed for pain maximum 2 tablet(s) per day    Cervicalgia       cyclobenzaprine 10 MG tablet    FLEXERIL    30 tablet    Take 0.5-1 tablets (5-10 mg) by mouth 3 times daily as needed for muscle spasms    Cervicalgia       FAMOTIDINE PO      Take 20 mg by mouth daily        GNP GARLIC EXTRACT PO           omeprazole 40 MG capsule    priLOSEC    30 capsule    Take 1 capsule (40 mg) by mouth daily Take 30-60 minutes before a meal.    Gastroesophageal reflux disease, esophagitis presence not specified       VITAMIN D (CHOLECALCIFEROL) PO      Take by mouth daily

## 2018-04-05 ENCOUNTER — TELEPHONE (OUTPATIENT)
Dept: GASTROENTEROLOGY | Facility: CLINIC | Age: 58
End: 2018-04-05

## 2018-04-05 NOTE — TELEPHONE ENCOUNTER
Patient scheduled for EGD    Indication for procedure. reflux    Referring Provider. Ddr. Tres    ? no    Arrival time verified? Yes, 10:30 am    Facility location verified? [address] [place] [address], 3-150    Instructions given regarding prep and procedure    Prep Type npo for 6 hours prior to procedure    Are you taking any anticoagulants or blood thinners? no    Instructions given? Yes, verbally and written    Electronic implanted devices? no    Pre procedure teaching completed? Yes    Transportation from procedure? yes    H&P / Pre op physical completed? Na    Liudmila Moon RN

## 2018-04-11 ENCOUNTER — SURGERY (OUTPATIENT)
Age: 58
End: 2018-04-11

## 2018-04-11 ENCOUNTER — HOSPITAL ENCOUNTER (OUTPATIENT)
Facility: AMBULATORY SURGERY CENTER | Age: 58
End: 2018-04-11
Attending: INTERNAL MEDICINE
Payer: COMMERCIAL

## 2018-04-11 VITALS
RESPIRATION RATE: 16 BRPM | SYSTOLIC BLOOD PRESSURE: 122 MMHG | TEMPERATURE: 98.1 F | DIASTOLIC BLOOD PRESSURE: 90 MMHG | OXYGEN SATURATION: 99 %

## 2018-04-11 LAB — UPPER GI ENDOSCOPY: NORMAL

## 2018-04-11 RX ORDER — DIPHENHYDRAMINE HYDROCHLORIDE 50 MG/ML
INJECTION INTRAMUSCULAR; INTRAVENOUS PRN
Status: DISCONTINUED | OUTPATIENT
Start: 2018-04-11 | End: 2018-04-11 | Stop reason: HOSPADM

## 2018-04-11 RX ORDER — ONDANSETRON 2 MG/ML
4 INJECTION INTRAMUSCULAR; INTRAVENOUS
Status: DISCONTINUED | OUTPATIENT
Start: 2018-04-11 | End: 2018-04-12 | Stop reason: HOSPADM

## 2018-04-11 RX ORDER — LIDOCAINE 40 MG/G
CREAM TOPICAL
Status: DISCONTINUED | OUTPATIENT
Start: 2018-04-11 | End: 2018-04-12 | Stop reason: HOSPADM

## 2018-04-11 RX ORDER — FENTANYL CITRATE 50 UG/ML
INJECTION, SOLUTION INTRAMUSCULAR; INTRAVENOUS PRN
Status: DISCONTINUED | OUTPATIENT
Start: 2018-04-11 | End: 2018-04-11 | Stop reason: HOSPADM

## 2018-04-11 RX ADMIN — FENTANYL CITRATE 50 MCG: 50 INJECTION, SOLUTION INTRAMUSCULAR; INTRAVENOUS at 11:40

## 2018-04-11 RX ADMIN — FENTANYL CITRATE 50 MCG: 50 INJECTION, SOLUTION INTRAMUSCULAR; INTRAVENOUS at 11:25

## 2018-04-11 RX ADMIN — DIPHENHYDRAMINE HYDROCHLORIDE 50 MG: 50 INJECTION INTRAMUSCULAR; INTRAVENOUS at 11:30

## 2018-04-11 RX ADMIN — FENTANYL CITRATE 50 MCG: 50 INJECTION, SOLUTION INTRAMUSCULAR; INTRAVENOUS at 11:32

## 2018-04-11 NOTE — DISCHARGE INSTRUCTIONS
Discharge Instructions after  Upper Endoscopy (EGD)    Activity and Diet  You were given medicine for pain. You may be dizzy or sleepy.  For 24 hours:    Do not drive or use heavy equipment.    Do not make important decisions.    Do not drink any alcohol.  ___ You may return to your regular diet.    Discomfort  You may have a sore throat for 2 to 3 days. It may help to:    Avoid hot liquids for 24 hours.    Use sore throat lozenges.    Gargle as needed with salt water up to 4 times a day. Mix 1 cup of warm water  with 1 teaspoon of salt. Do not swallow.  ___ Your esophagus was dilated (opened) or banded during the exam:    Drink only cool liquids for the rest of the day. Eat a soft diet for the next few days.    You may have a sore chest for 2 to 3 days.    You may take Tylenol (acetaminophen) for pain unless your doctor has told you not to.    Do not take aspirin or ibuprofen (Advil, Motrin) or other NSAIDS  (anti-inflammatory drugs) for ___ days.    Follow-up  ___ We took small tissue samples for study. If you do not have a follow-up visit scheduled,  call your provider s office in 2 weeks for the results.    Other instructions________________________________________________________    When to call us:  Problems are rare. Call right away if you have:    Unusual throat pain or trouble swallowing    Unusual pain in belly or chest that is not relieved by belching or passing air    Black stools (tar-like looking bowel movement)    Temperature above 100.6  F. (37.5  C).    If you vomit blood or have severe pain, go to an emergency room.    If you have questions, call:  Monday to Friday, 7 a.m. to 4:30 p.m.: Endoscopy: [phone] (We may have to call you back)    After hours: Hospital: [phone] (Ask for the GI fellow on call)

## 2018-04-11 NOTE — IP AVS SNAPSHOT
MRN:[id number]                      After Visit Summary   4/11/2018    Sejal Devlin    MRN: [id number]           Thank you!     Thank you for choosing [facility] for your care. Our goal is always to provide you with excellent care. Hearing back from our patients is one way we can continue to improve our services. Please take a few minutes to complete the written survey that you may receive in the mail after you visit with us. Thank you!        Patient Information     Date Of Birth          1960        About your hospital stay     You were admitted on:  April 11, 2018 You last received care in the:  [facility]    You were discharged on:  April 11, 2018       Who to Call     For medical emergencies, please call 911.  For non-urgent questions about your medical care, please call your primary care provider or clinic, [phone]  For questions related to your surgery, please call your surgery clinic        Attending Provider     Provider Specialty    Poonam Alatorre MD INTERNAL MEDICINE, HEPATOLOGY       Primary Care Provider Office Phone # Fax #    JONATAN Ramírez [facility] [phone]      Your next 10 appointments already scheduled     Oct 11, 2018  9:00 AM CDT   (Arrive by 8:45 AM)   Return Visit with Acacia Chandler MD   [facility] ([facility])    [address]  [address] [place] [address] [phone]              Further instructions from your care team       Discharge Instructions after  Upper Endoscopy (EGD)    Activity and Diet  You were given medicine for pain. You may be dizzy or sleepy.  For 24 hours:    Do not drive or use heavy equipment.    Do not make important decisions.    Do not drink any alcohol.  ___ You may return to your regular diet.    Discomfort  You may have a sore throat for 2 to 3 days. It may help to:    Avoid hot liquids for 24 hours.    Use sore throat  lozenges.    Gargle as needed with salt water up to 4 times a day. Mix 1 cup of warm water  with 1 teaspoon of salt. Do not swallow.  ___ Your esophagus was dilated (opened) or banded during the exam:    Drink only cool liquids for the rest of the day. Eat a soft diet for the next few days.    You may have a sore chest for 2 to 3 days.    You may take Tylenol (acetaminophen) for pain unless your doctor has told you not to.    Do not take aspirin or ibuprofen (Advil, Motrin) or other NSAIDS  (anti-inflammatory drugs) for ___ days.    Follow-up  ___ We took small tissue samples for study. If you do not have a follow-up visit scheduled,  call your provider s office in 2 weeks for the results.    Other instructions________________________________________________________    When to call us:  Problems are rare. Call right away if you have:    Unusual throat pain or trouble swallowing    Unusual pain in belly or chest that is not relieved by belching or passing air    Black stools (tar-like looking bowel movement)    Temperature above 100.6  F. (37.5  C).    If you vomit blood or have severe pain, go to an emergency room.    If you have questions, call:  Monday to Friday, 7 a.m. to 4:30 p.m.: Endoscopy: [phone] (We may have to call you back)    After hours: Hospital: [phone] (Ask for the GI fellow on call)    Pending Results     No orders found from 4/9/2018 to 4/12/2018.            Admission Information     Date & Time Provider Department Dept. Phone    4/11/2018 Poonam Alatorre MD [facility] [phone]      Your Vitals Were     Blood Pressure Temperature Respirations Last Period Pulse Oximetry       167/90 98.1  F (36.7  C) (Oral) 20 12/18/2004 100%       MyChart Information     [employer] gives you secure access to your electronic health record. If you see a primary care provider, you can also send messages to your care team and make appointments. If you have questions, please call  your primary care clinic.  If you do not have a primary care provider, please call [phone] and they will assist you.      [employer] is an electronic gateway that provides easy, online access to your medical records. With [employer], you can request a clinic appointment, read your test results, renew a prescription or communicate with your care team.     To access your existing account, please contact your [facility] or call [phone] for assistance.        Care EveryWhere ID     This is your Care EveryWhere ID. This could be used by other organizations to access your [place] medical records  [id number]        Equal Access to Services     XENA LUIS : Hadeber Adam, melanie anand, raoul carbajal, carly veliz . So [facility] [phone].    ATENCIÓN: Si habla español, tiene a badillo disposición servicios gratuitos de asistencia lingüística. Llame al [phone].    We comply with applicable federal civil rights laws and [place] laws. We do not discriminate on the basis of race, color, national origin, age, disability, sex, sexual orientation, or gender identity.               Review of your medicines      UNREVIEWED medicines. Ask your doctor about these medicines        Dose / Directions    acetaminophen-codeine 300-30 MG per tablet   Commonly known as:  TYLENOL #3   Used for:  Cervicalgia        Dose:  1 tablet   Take 1 tablet by mouth every 8 hours as needed for pain maximum 2 tablet(s) per day   Quantity:  10 tablet   Refills:  0       cyclobenzaprine 10 MG tablet   Commonly known as:  FLEXERIL   Used for:  Cervicalgia        Dose:  5-10 mg   Take 0.5-1 tablets (5-10 mg) by mouth 3 times daily as needed for muscle spasms   Quantity:  30 tablet   Refills:  1       FAMOTIDINE PO        Dose:  20 mg   Take 20 mg by mouth daily   Refills:  0       GNP GARLIC EXTRACT PO        Refills:  0       omeprazole 40 MG capsule   Commonly  known as:  priLOSEC   Used for:  Gastroesophageal reflux disease, esophagitis presence not specified        Dose:  40 mg   Take 1 capsule (40 mg) by mouth daily Take 30-60 minutes before a meal.   Quantity:  30 capsule   Refills:  1       VITAMIN D (CHOLECALCIFEROL) PO        Take by mouth daily   Refills:  0                Protect others around you: Learn how to safely use, store and throw away your medicines at www.disposemymeds.org.             Medication List: This is a list of all your medications and when to take them. Check marks below indicate your daily home schedule. Keep this list as a reference.      Medications           Morning Afternoon Evening Bedtime As Needed    acetaminophen-codeine 300-30 MG per tablet   Commonly known as:  TYLENOL #3   Take 1 tablet by mouth every 8 hours as needed for pain maximum 2 tablet(s) per day                                cyclobenzaprine 10 MG tablet   Commonly known as:  FLEXERIL   Take 0.5-1 tablets (5-10 mg) by mouth 3 times daily as needed for muscle spasms                                FAMOTIDINE PO   Take 20 mg by mouth daily                                GNP GARLIC EXTRACT PO                                omeprazole 40 MG capsule   Commonly known as:  priLOSEC   Take 1 capsule (40 mg) by mouth daily Take 30-60 minutes before a meal.                                VITAMIN D (CHOLECALCIFEROL) PO   Take by mouth daily

## 2018-04-11 NOTE — IP AVS SNAPSHOT
[facility]    [address] [place] [address]    Phone:  [phone]    Fax:  [phone]                                       After Visit Summary   4/11/2018    Sejal Devlin    MRN: [id number]           After Visit Summary Signature Page     I have received my discharge instructions, and my questions have been answered. I have discussed any challenges I see with this plan with the nurse or doctor.    ..........................................................................................................................................  Patient/Patient Representative Signature      ..........................................................................................................................................  Patient Representative Print Name and Relationship to Patient    ..................................................               ................................................  Date                                            Time    ..........................................................................................................................................  Reviewed by Signature/Title    ...................................................              ..............................................  Date                                                            Time

## 2018-04-12 LAB — COPATH REPORT: NORMAL

## 2018-05-03 PROBLEM — K22.70 BARRETT ESOPHAGUS: Status: ACTIVE | Noted: 2018-05-03

## 2018-06-29 ENCOUNTER — MYC MEDICAL ADVICE (OUTPATIENT)
Dept: FAMILY MEDICINE | Facility: CLINIC | Age: 58
End: 2018-06-29

## 2018-07-06 ENCOUNTER — RADIANT APPOINTMENT (OUTPATIENT)
Dept: CARDIOLOGY | Facility: CLINIC | Age: 58
End: 2018-07-06
Attending: FAMILY MEDICINE
Payer: COMMERCIAL

## 2018-07-06 DIAGNOSIS — R06.09 DYSPNEA ON EXERTION: ICD-10-CM

## 2018-07-06 RX ADMIN — Medication 5 ML: at 16:00

## 2018-09-04 ENCOUNTER — VIRTUAL VISIT (OUTPATIENT)
Dept: FAMILY MEDICINE | Facility: OTHER | Age: 58
End: 2018-09-04

## 2018-09-04 ENCOUNTER — NURSE TRIAGE (OUTPATIENT)
Dept: NURSING | Facility: CLINIC | Age: 58
End: 2018-09-04

## 2018-09-04 NOTE — TELEPHONE ENCOUNTER
"  Reason for Disposition    Urinating more frequently than usual (i.e., frequency)    Additional Information    Negative: Shock suspected (e.g., cold/pale/clammy skin, too weak to stand, low BP, rapid pulse)    Negative: Sounds like a life-threatening emergency to the triager    Negative: Followed a genital area injury    Negative: Followed a genital area injury (penis, scrotum)    Negative: Vaginal discharge    Negative: Pus (white, yellow) or bloody discharge from end of penis    Negative: [1] Taking antibiotic for urinary tract infection (UTI) AND [2] female    Negative: [1] Taking antibiotic for urinary tract infection (UTI) AND [2] male    Negative: [1] Discomfort (pain, burning or stinging) when passing urine AND [2] pregnant    Negative: [1] Discomfort (pain, burning or stinging) when passing urine AND [2] postpartum < 1 month    Negative: [1] Discomfort (pain, burning or stinging) when passing urine AND [2] female    Negative: [1] Discomfort (pain, burning or stinging) when passing urine AND [2] male    Negative: Pain or itching in the vulvar area    Negative: Pain in scrotum is main symptom    Negative: Blood in the urine is main symptom    Negative: Symptoms arising from use of a urinary catheter ([place] or Coude)    Negative: [1] Unable to urinate (or only a few drops) > 4 hours AND     [2] bladder feels very full (e.g., palpable bladder or strong urge to urinate)    Negative: [1] Decreased urination and [2] drinking very little AND [2] dehydration suspected (e.g., dark urine, no urine > 12 hours, very dry mouth, very lightheaded)    Negative: Patient sounds very sick or weak to the triager    Negative: Fever > 100.5 F (38.1 C)    Negative: Side (flank) or lower back pain present    Negative: [1] Can't control passage of urine (i.e., urinary incontinence) AND [2] new onset (< 2 weeks) or worsening    Answer Assessment - Initial Assessment Questions  1. SYMPTOM: \"What's the main symptom you're concerned " "about?\" (e.g., frequency, incontinence)      Frequency with blood in urine  2. ONSET: \"When did the  ________  start?\"      This evening  3. PAIN: \"Is there any pain?\" If so, ask: \"How bad is it?\" (Scale: 1-10; mild, moderate, severe)      moderate  4. CAUSE: \"What do you think is causing the symptoms?\"      Pt believes she has a UTI  5. OTHER SYMPTOMS: \"Do you have any other symptoms?\" (e.g., fever, flank pain, blood in urine, pain with urination)      Blood in urine  6. PREGNANCY: \"Is there any chance you are pregnant?\" \"When was your last menstrual period?\"      no    Protocols used: URINARY SYMPTOMS-ADULT-AH    "

## 2018-09-04 NOTE — PROGRESS NOTES
"Date:   Clinician: Minda Bahena  Clinician NPI: [id number]  Patient: Sejal Devlin  Patient : 1960  Patient Address: [address] [place] [address]  Patient Phone: [phone]  Visit Protocol: UTI  Patient Summary:  Sejal is a 58 year old ( : 1960 ) female who initiated a Visit for a presumed bladder infection. When asked the question \"Please sign me up to receive news, health information and promotions from "[employer]".\", Sejal responded \"Yes\".    Her symptoms began today and consist of dysuria, foul smelling urine, hesitation, urgency, and urinary frequency.   Symptom Details   Urinary Frequency: Up to every 5 minutes    She denies nausea, hematuria, urinary incontinence, vaginal discharge, abdominal pain, recent antibiotic use, chills, vomiting, loss of appetite, feeling feverish, and flank pain. Sejal has never had kidney stones. She has not been hospitalized, been a patient in a nursing home, or had a catheter in the past two weeks. She denies risk factors for a sexually transmitted disease.  Sejal has had one (1) UTI in the past 12 months. Her most recent bladder infection was not within the last 4 weeks. Her current symptoms are similar to the previous UTI symptoms. She took an antibiotic for her last infection but does not remember which one.   Sejal does not get yeast infections when she takes antibiotics.   She denies pregnancy and denies breastfeeding. She does not menstruate.   MEDICATIONS: No current medications, ALLERGIES: NKDA  Clinician Response:  Dear Sejal,  Based on the information you have provided, you likely have a bladder infection, also called acute urinary tract infection (UTI).   To treat your infection, I am prescribing:    Nitrofurantoin monohyd/m-cryst (Macrobid) 100 mg oral capsule. Take 1 capsule by mouth every 12 hours for 5 days. Take the medication with food. Continue taking the capsules even if you feel better before all of the medication " is gone. There are no refills with this prescription.  Some women may develop a yeast infection as a side effect of taking antibiotics. If you notice symptoms of a yeast infection, [facility] can help treat that condition as well. Simply log in and complete another Visit, which will cover all of the necessary questions to determine the best treatment for you.   Some people develop allergies to antibiotics. If you notice a new rash, significant swelling, or difficulty breathing, stop the medication immediately and go into a clinic for physical evaluation.   If you become pregnant during this course of treatment, stop taking the medication and contact your primary care provider.   To help treat your current UTI and prevent future occurrences, remember to:     Drink 8-10, 8-ounce glasses of water daily.    Urinate after sexual intercourse.    Wipe front to back after using the bathroom.     You should visit a clinic for a follow-up visit if your symptoms do not improve in 1-2 days or if you experience another urinary tract infection soon after completing this treatment.   Diagnosis: Acute uncomplicated bladder infection  Diagnosis ICD: N39.0  Prescription: nitrofurantoin monohyd/m-cryst (Macrobid) 100 mg oral capsule 10 capsule, 5 days supply. Take 1 capsule by mouth every 12 hours for 5 days. Refills: 0, Refill as needed: no, Allow substitutions: yes  Pharmacy: [id number] - [phone] - [address], [place] [address]

## 2018-09-25 NOTE — TELEPHONE ENCOUNTER
FUTURE VISIT INFORMATION      FUTURE VISIT INFORMATION:    Date: 10.8.18    Time: 8:00 AM    Location: [facility]  REFERRAL INFORMATION:    Referring provider:  Dr. Joshua    Referring providers clinic:      Reason for visit/diagnosis  Dyspnea on exertion    RECORDS REQUESTED FROM:       Clinic name Comments Records Status Imaging Status   FV Referral placed 4.4.18 OV 3.19.18 EPIC                                    RECORDS STATUS      RECORDS RECEIVED FROM:    DATE RECEIVED: 10.8.18   NOTES STATUS DETAILS   OFFICE NOTE from referring provider Internal 4.4.18   OFFICE NOTE from other specialist Internal 8.1.17- Cough  5.24.17-Cough  5.16.17 walking pneumonia     DISCHARGE SUMMARY from hospital N/A    DISCHARGE REPORT from the ER N/A    OPERATIVE REPORT N/A    MEDICATION LIST Internal    IMAGING  (NEED IMAGES AND REPORTS)     CT SCAN Internal 7.12.17   CHEST XRAY (CXR) Internal 3.19.18   TESTS     PULMONARY FUNCTION TESTING (PFT) In process Scheduled for 10.8.18   FLOW LOOP VOLUME (FVL) In process Scheduled for 10.8.18   CYSTIC FIBROSIS     CF SPUTUM CULTURE N/A

## 2018-10-06 ENCOUNTER — HEALTH MAINTENANCE LETTER (OUTPATIENT)
Age: 58
End: 2018-10-06

## 2018-10-08 ENCOUNTER — PRE VISIT (OUTPATIENT)
Dept: PULMONOLOGY | Facility: CLINIC | Age: 58
End: 2018-10-08

## 2018-10-11 ENCOUNTER — OFFICE VISIT (OUTPATIENT)
Dept: RHEUMATOLOGY | Facility: CLINIC | Age: 58
End: 2018-10-11
Attending: INTERNAL MEDICINE
Payer: COMMERCIAL

## 2018-10-11 VITALS
HEART RATE: 72 BPM | BODY MASS INDEX: 27.32 KG/M2 | SYSTOLIC BLOOD PRESSURE: 159 MMHG | TEMPERATURE: 98.3 F | DIASTOLIC BLOOD PRESSURE: 95 MMHG | OXYGEN SATURATION: 99 % | WEIGHT: 151.8 LBS

## 2018-10-11 DIAGNOSIS — Z23 ENCOUNTER FOR IMMUNIZATION: Primary | ICD-10-CM

## 2018-10-11 PROCEDURE — G0008 ADMIN INFLUENZA VIRUS VAC: HCPCS | Mod: ZF

## 2018-10-11 PROCEDURE — 25000128 H RX IP 250 OP 636: Mod: ZF | Performed by: INTERNAL MEDICINE

## 2018-10-11 PROCEDURE — G0463 HOSPITAL OUTPT CLINIC VISIT: HCPCS | Mod: 25,ZF

## 2018-10-11 PROCEDURE — 90686 IIV4 VACC NO PRSV 0.5 ML IM: CPT | Mod: ZF | Performed by: INTERNAL MEDICINE

## 2018-10-11 RX ADMIN — INFLUENZA A VIRUS A/MICHIGAN/45/2015 X-275 (H1N1) ANTIGEN (FORMALDEHYDE INACTIVATED), INFLUENZA A VIRUS A/SINGAPORE/INFIMH-16-0019/2016 IVR-186 (H3N2) ANTIGEN (FORMALDEHYDE INACTIVATED), INFLUENZA B VIRUS B/PHUKET/3073/2013 ANTIGEN (FORMALDEHYDE INACTIVATED), AND INFLUENZA B VIRUS B/MARYLAND/15/2016 BX-69A ANTIGEN (FORMALDEHYDE INACTIVATED) 0.5 ML: 15; 15; 15; 15 INJECTION, SUSPENSION INTRAMUSCULAR at 10:21

## 2018-10-11 ASSESSMENT — PAIN SCALES - GENERAL: PAINLEVEL: NO PAIN (0)

## 2018-10-11 NOTE — NURSING NOTE
Chief Complaint   Patient presents with     RECHECK     1 year follow up Sjogrens     BP (!) 159/95  Pulse 72  Temp 98.3  F (36.8  C) (Oral)  Wt 68.9 kg (151 lb 12.8 oz)  LMP 12/18/2004  SpO2 99%  BMI 27.32 kg/m2   Corrine Hedrick

## 2018-10-11 NOTE — MR AVS SNAPSHOT
After Visit Summary   10/11/2018    Sejal Deviln    MRN: [id number]           Patient Information     Date Of Birth          1960        Visit Information        Provider Department      10/11/2018 9:00 AM Acacia Chandler MD [facility]        Today's Diagnoses     Encounter for immunization    -  1      Care Instructions    F/u with pulmonary    Flu shot today    Recommend shingrix vaccine    Recommend PT for hip bursitis, if no help contact me for bursitis shots    Labs locally    Return in a year              Follow-ups after your visit        Follow-up notes from your care team     Return in about 1 year (around 10/11/2019).      Your next 10 appointments already scheduled     Oct 26, 2018  1:00 PM CDT   FULL PULMONARY FUNCTION with  PFL C   [facility] Pulmonary Function Testing ([facility])    [address]  3rd Floor  [facility] [address] [phone]            Oct 26, 2018  1:00 PM CDT   (Arrive by 12:45 PM)   New Patient Visit with Kimberly Saez MD   [facility] ([facility])    [address]  [address] [place] [address] [phone]              Who to contact     If you have questions or need follow up information about today's clinic visit or your schedule please contact [facility] directly at [phone].  Normal or non-critical lab and imaging results will be communicated to you by MyChart, letter or phone within 4 business days after the clinic has received the results. If you do not hear from us within 7 days, please contact the clinic through MyChart or phone. If you have a critical or abnormal lab result, we will notify you by phone as soon as possible.  Submit refill requests through "[employer]" or call your pharmacy and they will forward the refill request to us. Please allow 3 business days for your refill to be completed.           Additional Information About Your Visit        MyChart Information     [employer] gives you secure access to your electronic health record. If you see a primary care provider, you can also send messages to your care team and make appointments. If you have questions, please call your primary care clinic.  If you do not have a primary care provider, please call [phone] and they will assist you.        Care EveryWhere ID     This is your Care EveryWhere ID. This could be used by other organizations to access your [facility] medical records  [id number]        Your Vitals Were     Pulse Temperature Last Period Pulse Oximetry BMI (Body Mass Index)       72 98.3  F (36.8  C) (Oral) 12/18/2004 99% 27.32 kg/m2        Blood Pressure from Last 3 Encounters:   10/11/18 (!) 159/95   04/11/18 122/90   04/04/18 129/78    Weight from Last 3 Encounters:   10/11/18 68.9 kg (151 lb 12.8 oz)   04/04/18 70.3 kg (155 lb)   03/19/18 68.9 kg (152 lb)               Primary Care Provider Office Phone # Fax #    JONATAN Ramírez [phone] [address] [place] [address]        Equal Access to Services     XENA LUIS AH: Hadii aad ku hadasho Soomaali, waaxda luqadaha, qaybta kaalmada adeegyada, waxay idiin haysorayan lyric bui ladana . So [facility] [phone].    ATENCIÓN: Si habla español, tiene a badillo disposición servicios gratuitos de asistencia lingüística. Llame al [phone].    We comply with applicable federal civil rights laws and [place] laws. We do not discriminate on the basis of race, color, national origin, age, disability, sex, sexual orientation, or gender identity.            Thank you!     Thank you for choosing [facility]  for your care. Our goal is always to provide you with excellent care. Hearing back from our patients is one way we can continue to improve our services. Please take a few minutes to complete the written survey that you may receive in the mail  after your visit with us. Thank you!             Your Updated Medication List - Protect others around you: Learn how to safely use, store and throw away your medicines at www.disposemymeds.org.          This list is accurate as of 10/11/18 11:59 PM.  Always use your most recent med list.                   Brand Name Dispense Instructions for use Diagnosis    acetaminophen-codeine 300-30 MG per tablet    TYLENOL #3    10 tablet    Take 1 tablet by mouth every 8 hours as needed for pain maximum 2 tablet(s) per day    Cervicalgia       cyclobenzaprine 10 MG tablet    FLEXERIL    30 tablet    Take 0.5-1 tablets (5-10 mg) by mouth 3 times daily as needed for muscle spasms    Cervicalgia       FAMOTIDINE PO      Take 20 mg by mouth daily        GNP GARLIC EXTRACT PO           omeprazole 40 MG capsule    priLOSEC    90 capsule    TAKE 1 CAPSULE (40 MG) BY MOUTH DAILY TAKE 30-60 MINUTES BEFORE A MEAL.    Gastroesophageal reflux disease, esophagitis presence not specified       VITAMIN D (CHOLECALCIFEROL) PO      Take by mouth daily

## 2018-10-11 NOTE — PROGRESS NOTES
Rheumatology Clinic Visit     Sejal Devlin MRN# [id number]   YOB: 1960 Age: 58 year old     Date of Visit: October 11, 2018  Primary care provider: Marita Freitas          Assessment and Plan:     Assessment:  Seronegative primary Sjogren's syndrome (+lip biopsy 5/2014). Continue kenalog in orabase paste PRN to be used for nasal ulcers. Sicca sx are mild and not bothersome at this time.     Pt today is most concerned for worsening SOB, and has appointment with Pulmonology on 10/26/18 with PFTs. Initially thought 2/2 GERD but was refractory to famotidine tx.     Discussed with pt investigation of possible risk factors for lymphoma with the following labs: C3/C4, dsDNA, KATERINE, protein electrophoresis, cryoglobulin, ESR, vit D, however pt declined due increased expenses as pt has not yet met deductible. Agreed to basic labs, however.    Hi pain is consistent with B/L hip bursitis.    Shingrix vaccine. Recommended shingrix. CDC recommends this vaccine 2 doses,  by 2-6 months for adults 50 years and older. It is killed virus and ok to be used in immunocompromised patients. Shingrix reduces the risk of shingles and PHN by more than 90% in people 50 and older.     AE include: pain, redness and swelling at the inj site, myalgia, fatigue, headaches, shivering, fever and stomach upset.    She will get it locally    Plan:  - Labs at [facility]: ALT, CBC, UA, Cr  - F/u with Pulmonology as scheduled 10/26/18  - Flu shot today  -Recommend shingrix vaccine  -Recommend PT for b/l hip bursitis and contact me if wants bursitis shots  - Rtc in one year    Seen and staffed with Dr. Chandler.    Denice Giron MD  IM PGY-2  [id number]    Attending Note: I saw and evaluated the patient with Dr. Dr. Giron. I agree with the assessment and plan.    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     Priority: Medium     EGD 2018       Sjogren's syndrome (H) 05/24/2017     Priority: Medium     Vitamin D deficiency 09/26/2016     Priority: Medium     Dry mouth 12/03/2013     Priority: Medium     Circulation problem 10/02/2013     Priority: Medium     Rash 06/06/2013     Priority: Medium     Family history of colon cancer 05/22/2012     Priority: Medium     Mother         Hyperlipidemia LDL goal <160 10/31/2010     Priority: Medium     Per provider       Osteopenia 06/23/2010     Priority: Medium            History of Present Illness:   Sejal Devlin is a 57 year old female who presents for Sjogren's follow up appointment.    03/05/14: HPI from initial visit     Ms. Devlin is a 52 yo WF who was referred to our clinic for evaluation and management of her several complaints including dryness of mouth. She wants to be evaluated for Sjogren's.     In 10/2013, when she was working on her garage sale at [employer], noticed having dryness of mouth which she blamed stress for it. It lasted x 2 weeks. Then sh developed salt/metallic taste in her mouth, chewing gums helps. Reports numbness of toes since last summer. She was diagnose with vaginal atrophy. Reports ear ache. Her joint pain started months ago. Most of her pain is localized to the knees, no joint swelling but has AM stiffness x 10-15 minutes. Reports getting sores in her nose x at least a year. She has tried putting vaseline on them, an dit has helped. Has no sores today. Gums are tender but has no oral ulcers. Has SOB x 2 yr, reports having neg work up for asthma. Reports getting episodes of SOB x 2-3 wk. She is in the middle of episode, gets SOB even at rest. She does not think it's anxiety related. Reports having fatigue, some days are better than others. Some days she wakes up with feeling sick and having flu like sx, but it does not  "happen often. Had 2-3 episodes of it. Reports itching of skin worse in summer, no visible rash. Itching gets worse in Sun. Reports having earaches lately. Had diarrhea but it's under better control with increasing fibers. She was to the point that she had to wear pads. Feels bloated but gets no belching.     08/29/14:Evoxac was added at last visit. Lip biopsy done in 5/2014 confirmed Dx of primary Sjogren's syndrome.     Complains of vaginal dryness, reports red painful rash under breasts and is using anti-fungal topical agent. Complains of salty taste in her mouth not much dryness (no GERD sx) therefore never tried evoxac. Her major complaint is episodes of SOB without any triggers, not related to activity or stress. Currently has no SOB. The episodes are self limited. Reports having these episodes x years with neg work up including NL PFT in the past. Episodes last between few days- up to 2 weeks. Had eye exam and was told having mild dryness of eyes. Dryness of eyes/mouth do not bother her. Has intermittent knee pain without joint swelling. Gets tingling in toes. Nasal ulcers are under control.     09/15/2014:  Yesterday she was feeling more shortness of breath.   Today and this afternoon, she is much better.   No exertional shortness of breath.   No chest pain.     10/11/2017:  The patient reports feeling good, but reports having a long episode of SOB and cough during March and April of this year.  This started with a \"cool\" followed with a persistent cough that \"didn't go away\" also reported bad taste in her mouth during this time.  She was referred to pulmonology with CT chest showing no clear source of symptoms including ILD.  Patient also tired inhalers Albuterol and Advair with no improvement in symptoms. She reported slow improvement in her breathing.  Her symptoms seemed to respond to the addition of an H2 blocker and thinks that this my be the cause of the ongoing cough, which his since resolved.  " Patient reports being on a stable dose of 20 mg of Famotidine since August, with no metallic or funny taste in her mouth.      Patient did have an incidental lung nodule picked up on her chest CT, that per the patient was assessed by her pulmonologist with a 1 year follow up Chest CT planned.  Patient is no a current smoker but has significant past smoking use.    Patient continues to have knee pain, that is stable and believes that her sjogren's is well controlled, with some itching over the summer but no rashes.  Patient dose notice her dry mouth, but it doesn't concern her.  Reports stable toe numbness/neuropathy.    Today:  Notes marked SOB, and feels as though she has to lean over to breathe better, sometimes associated with dry cough. Improves with breathing through nose, leaning on something, or standing up straight. SEems to improve with mild to moderate activity.   Take more breaths with singing.   Mouth dryness manageable.   Toes numb, but not really a huge issue.   Hips ache x4-6 months, particularly after sleeping for a few hours will awaken due to hip pain. Feels stiff in the morning with mild pain, about 30 min to hour seems to get better          Review of Systems:     ROS:  A comprehensive ROS was done, positives are per HPI.    It is negative for fatigue, weight loss, fever, hair loss, rash, malar rash, discoid rash, photosensitivity, Raynaud's, oral ulcers, nasal ulcers,  Dysphagia, +dry mouth (stable), dry eyes, swollen lymph nodes, chest pain, SOB, cough, h/o serositis, N/V, heartburn, diarrhea, abdominal pain, blood in stool or urine, h/o proteinuria, myalgia, muscle weakness, +joint pain (noted in knees), joint swelling, morning stiffness, headaches, seizures, psychosis, +numbness (stable loss of sensation in toes), tingling, myelitis, easy bruising, depression, anxiety, memory problem, h/o thrombosis, h/o FMS, h/o vitamin D deficiency, h/o thyroid disease or blood transfusion.            Past  Medical History:     Past Medical History:   Diagnosis Date     Diverticulosis of colon     as noted on colonoscopy     Family history of colon cancer 2012     Family history of colon cancer      Family history of colon cancer      Postmenopausal 2005     Past Surgical History:   Procedure Laterality Date     APPENDECTOMY       COLONOSCOPY       ESOPHAGOSCOPY, GASTROSCOPY, DUODENOSCOPY (EGD), COMBINED N/A 2018    Procedure: COMBINED ESOPHAGOSCOPY, GASTROSCOPY, DUODENOSCOPY (EGD);  egd;  Surgeon: Poonam Alatorre MD;  Location: [facility] OR     ESOPHAGOSCOPY, GASTROSCOPY, DUODENOSCOPY (EGD), COMBINED N/A 2018    Procedure: COMBINED ESOPHAGOSCOPY, GASTROSCOPY, DUODENOSCOPY (EGD), BIOPSY SINGLE OR MULTIPLE;;  Surgeon: Poonam Alatorre MD;  Location: [facility] OR     GYN SURGERY           HC REMOVAL GALLBLADDER      Cholecystectomy     ORTHOPEDIC SURGERY      Broken right radius     SURGICAL HISTORY OF -       c- section            Social History:     Social History     Occupational History      [employer] secretary, and PCA      [employer]      Social History Main Topics     Smoking status: Former Smoker     Packs/day: 1.00     Years: 10.00     Types: Cigarettes     Start date: 2015     Quit date: 2008     Smokeless tobacco: Never Used      Comment: smoked age [age], quit x 1 yr and resumed then quit after one yr     Alcohol use Yes      Comment: one beer a day     Drug use: No     Sexual activity: Not Currently     Partners: Male     Birth control/ protection: Post-menopausal      Comment: Pt's  has a vasectomy          Family History:     Family History   Problem Relation Age of Onset     C.A.D. Mother      50s     Cancer - colorectal Mother       age 64, diagnosed age 54     Circulatory Mother      Coronary Artery Disease Mother      Colon Cancer Mother      C.A.D. Father      Eye Disorder Father      glaucoma     Lipids Father       Cancer Father      kidney     Hyperlipidemia Father      Substance Abuse Brother      Cardiovascular Brother      MI, stent at 52     Connective Tissue Disorder Other      cousin on father side has lupus     Coronary Artery Disease Brother      Coronary Artery Disease Brother      Substance Abuse Brother           Allergies:     Allergies   Allergen Reactions     No Known Drug Allergies      Seasonal Allergies           Medications:     Current Outpatient Prescriptions   Medication Sig Dispense Refill     GNP GARLIC EXTRACT PO        VITAMIN D, CHOLECALCIFEROL, PO Take by mouth daily       acetaminophen-codeine (TYLENOL #3) 300-30 MG per tablet Take 1 tablet by mouth every 8 hours as needed for pain maximum 2 tablet(s) per day (Patient not taking: Reported on 1/29/2018) 10 tablet 0     cyclobenzaprine (FLEXERIL) 10 MG tablet Take 0.5-1 tablets (5-10 mg) by mouth 3 times daily as needed for muscle spasms (Patient not taking: Reported on 1/29/2018) 30 tablet 1     FAMOTIDINE PO Take 20 mg by mouth daily       omeprazole (PRILOSEC) 40 MG capsule TAKE 1 CAPSULE (40 MG) BY MOUTH DAILY TAKE 30-60 MINUTES BEFORE A MEAL. (Patient not taking: Reported on 10/11/2018) 90 capsule 1          Physical Exam:   Blood pressure (!) 159/95, pulse 72, temperature 98.3  F (36.8  C), temperature source Oral, weight 68.9 kg (151 lb 12.8 oz), last menstrual period 12/18/2004, SpO2 99 %, not currently breastfeeding.  Wt Readings from Last 4 Encounters:   10/11/18 68.9 kg (151 lb 12.8 oz)   04/04/18 70.3 kg (155 lb)   03/19/18 68.9 kg (152 lb)   01/29/18 69.3 kg (152 lb 12.8 oz)     Constitutional: WD/WN. Pleasant. In no acute distress.  Eyes: EOM intact, PERRLA, sclera anicteric, conj not injected  HEENT: No oral ulcers or thrush. Poor salivary pool.  Neck: No cervical LAP or thyromegaly  Chest: Clear to auscultation bilaterally  CV: RRR, no murmurs/ rubs or gallops. No edema, clubbing or cyanosis. Varicose veins.  GI: Abdomen is soft  and non-tender.   MS: No synovitis. Cool joints. Tenderness over B/L greater trochanter. +Heberden nodes. Full ROM of the joints.   Skin: No skin rash, malar rash, livedo, alopecia, digital ulcers. + periungual erythema. Ridging of the nails.  Neuro: A&Ox3. Grossly non focal, muscular power 5/5 upper ane lower Extremities  Psych: NL affect and mood         Data:     Results for orders placed or performed in visit on 18   Echo stress test with definity    Narrative    [id number]  [id number]  [id number]  [id number]^JUSTUS^ROBERTO^           [facility]  Diagnostic and Treamtent-3rd Floor  [address] [place] [address]  Name: ELIZA VILLATORO  MRN: [id number]  : 1960  Study Date: 2018 03:32 PM  Age: 57 yrs  Gender: Female  Patient Location: [facility]  Reason For Study: Dyspnea on exertion  Ordering Physician: ROBERTO JEROME  Referring Physician: ROBERTO JEROME  Performed By: Jeffrey Omer RDCS     BSA: 1.7 m2  Height: 62 in  Weight: 155 lb  _____________________________________________________________________________  __     _____________________________________________________________________________  __        Interpretation Summary  Exercise stress echocardiogram with no inducible ischemia.     Target heart rate achieved. Normal blood pressure response to exercise.  No angina symptoms with exercise.  No ECG evidence of ischemia.  Normal segmental and global LV function with EF of approximately 55-60% at  rest; with exercise left ventricular cavity size decreases and LVEF increases  to 65-70%.  No stress induced regional wall motion abnormalities.  Average functional capacity for age.  Mildly dilated aortic root (3.8 cm at the Sinuses of Valsalva) and no  significant valvular dysfunction noted on screening 2D and Doppler  examination.  _____________________________________________________________________________  __     Stress  There was a  normal BP response to exercise.  Definity (NDC #00670-746-35) given intravenously.  Patient was given 5ml mixture of 1.5ml Definity and 8.5ml saline.  5 ml wasted.  IV start location R Upper arm .  Definity Expiration 04-01-19 .  Definity Lot # 6209 .  Limiting Symptom: None.  Peak MVO2 26.4 ml/kg/min .  Percent predicted MVO2 100 %.  RPP 25,752.  Maximum workload 125 biggs.  Target Heart Rate was achieved.  Exercise was stopped due to fatigue.  The patient did not exhibit any symptoms during exercise.  Normal blood pressure response with stress.     Stress Results                                       Maximum Predicted HR:   162 bpm             Target HR: 138 bpm        % Maximum Predicted HR: 91 %                             Stage  DurationHeart Rate  BP                                 (mm:ss)   (bpm)                         Baseline            71    133/87                           Peak    6:15     148    174/91                            Stress Duration:   6:15 mm:ss *                      Maximum Stress HR: 148 bpm *     Left Ventricle  Left ventricular systolic function is normal.     Aortic Valve  The aortic valve is normal in structure and function.        Mitral Valve  The mitral valve is normal in structure and function.     Tricuspid Valve  The tricuspid valve is normal in structure and function.     Right Ventricle  The right ventricular systolic function is normal.     Vessels  Mildly dilated ascending aorta. Sinuses of Valsalva measures 3.8 cm.     Pericardium  There is no pericardial effusion.     Procedure  Stress Echo Bike with two dimensional, color and spectral Doppler performed.  Contrast Definity.     _____________________________________________________________________________  __  MMode/2D Measurements & Calculations     asc Aorta Diam: 3.5 cm     Time Measurements     Doppler Measurements & Calculations     QLAB 2DQ/CMQ            _____________________________________________________________________________  __        Report approved by: Geri Kong 07/06/2018 04:37 PM          Recent Labs   Lab Test  01/06/17   1441  09/26/16   1627  08/29/14   1707  08/29/14   1706  03/05/14   1619  10/21/13   1428  06/27/13   1451   WBC   --    --    --   7.4  9.0   --   7.4   RBC   --    --    --   4.30  4.25   --   4.38   HGB  12.9  13.2   --   13.3  13.0  14.2  13.4   HCT   --    --    --   40.5  39.3   --   40.4   MCV   --    --    --   94  93   --   92   RDW   --    --    --   13.2  13.0   --   12.8   PLT   --    --    --   231  224   --   211   ALBUMIN   --    --    --   3.9  4.2  4.3  4.0   CRP   --    --   <2.9   --   <5.0  <5.0   --    BUN   --    --    --    --   19  18  17      Recent Labs   Lab Test  10/21/13   1428  06/27/13   1451  06/23/10   0942   TSH  1.24  0.85  0.88     Cyclic Cit Pept IgG/IgA   Date Value Ref Range Status   03/05/2014 <20  Interpretation:  Negative <20 UNITS Final     Hemoglobin   Date Value Ref Range Status   10/12/2017 13.3 11.7 - 15.7 g/dL Final   01/06/2017 12.9 11.7 - 15.7 g/dL Final   09/26/2016 13.2 11.7 - 15.7 g/dL Final     Urea Nitrogen   Date Value Ref Range Status   03/05/2014 19 7 - 30 mg/dL Final   10/21/2013 18 7 - 30 mg/dL Final   06/27/2013 17 7 - 30 mg/dL Final     Sed Rate   Date Value Ref Range Status   08/29/2014 11 0 - 30 mm/h Final   03/05/2014 14 0 - 30 mm/h Final     CRP Inflammation   Date Value Ref Range Status   08/29/2014 <2.9 0.0 - 8.0 mg/L Final   03/05/2014 <5.0 0.0 - 8.0 mg/L Final   10/21/2013 <5.0 0.0 - 8.0 mg/L Final     AST   Date Value Ref Range Status   10/12/2017 18 0 - 45 U/L Final   08/29/2014 19 0 - 45 U/L Final   03/05/2014 24 0 - 45 U/L Final     Albumin   Date Value Ref Range Status   08/29/2014 3.9 3.3 - 4.9 g/dL Final   03/05/2014 4.2 3.9 - 5.1 g/dL Final   10/21/2013 4.3 3.9 - 5.1 g/dL Final     Comment:     Reference range changed on 09/23/2008.     Alkaline  Phosphatase   Date Value Ref Range Status   03/05/2014 82 40 - 150 U/L Final   10/21/2013 83 40 - 150 U/L Final   06/27/2013 82 40 - 150 U/L Final     ALT   Date Value Ref Range Status   10/12/2017 23 0 - 50 U/L Final   08/29/2014 22 0 - 50 U/L Final   03/05/2014 25 0 - 50 U/L Final     Rheumatoid Factor   Date Value Ref Range Status   10/21/2013 11 0 - 14 IU/mL Final     Recent Labs   Lab Test  10/12/17   1530  01/06/17   1441  09/26/16   1627  08/29/14   1706  03/05/14   1619  10/21/13   1428  06/27/13   1451   WBC  8.5   --    --   7.4  9.0   --   7.4   HGB  13.3  12.9  13.2  13.3  13.0  14.2  13.4   HCT  42.1   --    --   40.5  39.3   --   40.4   MCV  98   --    --   94  93   --   92   PLT  228   --    --   231  224   --   211   BUN   --    --    --    --   19  18  17   TSH   --    --    --    --    --   1.24  0.85   AST  18   --    --   19  24  48*  27   ALT  23   --    --   22  25  29  23   ALKPHOS   --    --    --    --   82  83  82       Reviewed Rheumatology lab flowsheet

## 2018-10-11 NOTE — LETTER
10/11/2018    RE: Sejal Devlin  [address]  [place] [address]       Rheumatology Clinic Visit     Sejal Devlin MRN# [id number]   YOB: 1960 Age: 58 year old     Date of Visit: October 11, 2018  Primary care provider: Marita Freitas          Assessment and Plan:     Assessment:  Seronegative primary Sjogren's syndrome (+lip biopsy 5/2014). Continue kenalog in orabase paste PRN to be used for nasal ulcers. Sicca sx are mild and not bothersome at this time.     Pt today is most concerned for worsening SOB, and has appointment with Pulmonology on 10/26/18 with PFTs. Initially thought 2/2 GERD but was refractory to famotidine tx.     Discussed with pt investigation of possible risk factors for lymphoma with the following labs: C3/C4, dsDNA, KATERINE, protein electrophoresis, cryoglobulin, ESR, vit D, however pt declined due increased expenses as pt has not yet met deductible. Agreed to basic labs, however.    Hi pain is consistent with B/L hip bursitis.    Shingrix vaccine. Recommended shingrix. CDC recommends this vaccine 2 doses,  by 2-6 months for adults 50 years and older. It is killed virus and ok to be used in immunocompromised patients. Shingrix reduces the risk of shingles and PHN by more than 90% in people 50 and older.     AE include: pain, redness and swelling at the inj site, myalgia, fatigue, headaches, shivering, fever and stomach upset.    She will get it locally    Plan:  - Labs at [facility]: ALT, CBC, UA, Cr  - F/u with Pulmonology as scheduled 10/26/18  - Flu shot today  -Recommend shingrix vaccine  -Recommend PT for b/l hip bursitis and contact me if wants bursitis shots  - Rtc in one year    Seen and staffed with Dr. Chandler.    Denice Giron MD  IM PGY-2  [id number]    Attending Note: I saw and evaluated the patient with Dr. Dr. Giron. I agree with the assessment and plan.    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     Priority: Medium     EGD 2018       Sjogren's syndrome (H) 05/24/2017     Priority: Medium     Vitamin D deficiency 09/26/2016     Priority: Medium     Dry mouth 12/03/2013     Priority: Medium     Circulation problem 10/02/2013     Priority: Medium     Rash 06/06/2013     Priority: Medium     Family history of colon cancer 05/22/2012     Priority: Medium     Mother         Hyperlipidemia LDL goal <160 10/31/2010     Priority: Medium     Per provider       Osteopenia 06/23/2010     Priority: Medium            History of Present Illness:   Sejal Devlin is a 57 year old female who presents for Sjogren's follow up appointment.    03/05/14: HPI from initial visit     Ms. Devlin is a 52 yo WF who was referred to our clinic for evaluation and management of her several complaints including dryness of mouth. She wants to be evaluated for Sjogren's.     In 10/2013, when she was working on her garage sale at [employer], noticed having dryness of mouth which she blamed stress for it. It lasted x 2 weeks. Then sh developed salt/metallic taste in her mouth, chewing gums helps. Reports numbness of toes since last summer. She was diagnose with vaginal atrophy. Reports ear ache. Her joint pain started months ago. Most of her pain is localized to the knees, no joint swelling but has AM stiffness x 10-15 minutes. Reports getting sores in her nose x at least a year. She has tried putting vaseline on them, an dit has helped. Has no sores today. Gums are tender but has no oral ulcers. Has SOB x 2 yr, reports having neg work up for asthma. Reports getting episodes of SOB x 2-3 wk. She is in the middle of episode, gets SOB even at rest. She does not think it's anxiety related. Reports having fatigue, some days are better than  "others. Some days she wakes up with feeling sick and having flu like sx, but it does not happen often. Had 2-3 episodes of it. Reports itching of skin worse in summer, no visible rash. Itching gets worse in Sun. Reports having earaches lately. Had diarrhea but it's under better control with increasing fibers. She was to the point that she had to wear pads. Feels bloated but gets no belching.     08/29/14:Evoxac was added at last visit. Lip biopsy done in 5/2014 confirmed Dx of primary Sjogren's syndrome.     Complains of vaginal dryness, reports red painful rash under breasts and is using anti-fungal topical agent. Complains of salty taste in her mouth not much dryness (no GERD sx) therefore never tried evoxac. Her major complaint is episodes of SOB without any triggers, not related to activity or stress. Currently has no SOB. The episodes are self limited. Reports having these episodes x years with neg work up including NL PFT in the past. Episodes last between few days- up to 2 weeks. Had eye exam and was told having mild dryness of eyes. Dryness of eyes/mouth do not bother her. Has intermittent knee pain without joint swelling. Gets tingling in toes. Nasal ulcers are under control.     09/15/2014:  Yesterday she was feeling more shortness of breath.   Today and this afternoon, she is much better.   No exertional shortness of breath.   No chest pain.     10/11/2017:  The patient reports feeling good, but reports having a long episode of SOB and cough during March and April of this year.  This started with a \"cool\" followed with a persistent cough that \"didn't go away\" also reported bad taste in her mouth during this time.  She was referred to pulmonology with CT chest showing no clear source of symptoms including ILD.  Patient also tired inhalers Albuterol and Advair with no improvement in symptoms. She reported slow improvement in her breathing.  Her symptoms seemed to respond to the addition of an H2 blocker and " thinks that this my be the cause of the ongoing cough, which his since resolved.  Patient reports being on a stable dose of 20 mg of Famotidine since August, with no metallic or funny taste in her mouth.      Patient did have an incidental lung nodule picked up on her chest CT, that per the patient was assessed by her pulmonologist with a 1 year follow up Chest CT planned.  Patient is no a current smoker but has significant past smoking use.    Patient continues to have knee pain, that is stable and believes that her sjogren's is well controlled, with some itching over the summer but no rashes.  Patient dose notice her dry mouth, but it doesn't concern her.  Reports stable toe numbness/neuropathy.    Today:  Notes marked SOB, and feels as though she has to lean over to breathe better, sometimes associated with dry cough. Improves with breathing through nose, leaning on something, or standing up straight. SEems to improve with mild to moderate activity.   Take more breaths with singing.   Mouth dryness manageable.   Toes numb, but not really a huge issue.   Hips ache x4-6 months, particularly after sleeping for a few hours will awaken due to hip pain. Feels stiff in the morning with mild pain, about 30 min to hour seems to get better          Review of Systems:     ROS:  A comprehensive ROS was done, positives are per HPI.    It is negative for fatigue, weight loss, fever, hair loss, rash, malar rash, discoid rash, photosensitivity, Raynaud's, oral ulcers, nasal ulcers,  Dysphagia, +dry mouth (stable), dry eyes, swollen lymph nodes, chest pain, SOB, cough, h/o serositis, N/V, heartburn, diarrhea, abdominal pain, blood in stool or urine, h/o proteinuria, myalgia, muscle weakness, +joint pain (noted in knees), joint swelling, morning stiffness, headaches, seizures, psychosis, +numbness (stable loss of sensation in toes), tingling, myelitis, easy bruising, depression, anxiety, memory problem, h/o thrombosis, h/o FMS,  h/o vitamin D deficiency, h/o thyroid disease or blood transfusion.            Past Medical History:     Past Medical History:   Diagnosis Date     Diverticulosis of colon     as noted on colonoscopy     Family history of colon cancer 2012     Family history of colon cancer      Family history of colon cancer      Postmenopausal      Past Surgical History:   Procedure Laterality Date     APPENDECTOMY       COLONOSCOPY       ESOPHAGOSCOPY, GASTROSCOPY, DUODENOSCOPY (EGD), COMBINED N/A 2018    Procedure: COMBINED ESOPHAGOSCOPY, GASTROSCOPY, DUODENOSCOPY (EGD);  egd;  Surgeon: Poonam Alatorre MD;  Location: [facility] OR     ESOPHAGOSCOPY, GASTROSCOPY, DUODENOSCOPY (EGD), COMBINED N/A 2018    Procedure: COMBINED ESOPHAGOSCOPY, GASTROSCOPY, DUODENOSCOPY (EGD), BIOPSY SINGLE OR MULTIPLE;;  Surgeon: Poonam Alatorre MD;  Location: [facility] OR     GYN SURGERY           HC REMOVAL GALLBLADDER      Cholecystectomy     ORTHOPEDIC SURGERY      Broken right radius     SURGICAL HISTORY OF -       c- section            Social History:     Social History     Occupational History      [employer] secretary, and PCA      [employer]      Social History Main Topics     Smoking status: Former Smoker     Packs/day: 1.00     Years: 10.00     Types: Cigarettes     Start date: 2015     Quit date: 2008     Smokeless tobacco: Never Used      Comment: smoked age [age], quit x 1 yr and resumed then quit after one yr     Alcohol use Yes      Comment: one beer a day     Drug use: No     Sexual activity: Not Currently     Partners: Male     Birth control/ protection: Post-menopausal      Comment: Pt's  has a vasectomy          Family History:     Family History   Problem Relation Age of Onset     C.A.D. Mother      50s     Cancer - colorectal Mother       age 64, diagnosed age 54     Circulatory Mother      Coronary Artery Disease Mother      Colon Cancer  Mother      C.A.D. Father      Eye Disorder Father      glaucoma     Lipids Father      Cancer Father      kidney     Hyperlipidemia Father      Substance Abuse Brother      Cardiovascular Brother      MI, stent at 52     Connective Tissue Disorder Other      cousin on father side has lupus     Coronary Artery Disease Brother      Coronary Artery Disease Brother      Substance Abuse Brother           Allergies:     Allergies   Allergen Reactions     No Known Drug Allergies      Seasonal Allergies           Medications:     Current Outpatient Prescriptions   Medication Sig Dispense Refill     GNP GARLIC EXTRACT PO        VITAMIN D, CHOLECALCIFEROL, PO Take by mouth daily       acetaminophen-codeine (TYLENOL #3) 300-30 MG per tablet Take 1 tablet by mouth every 8 hours as needed for pain maximum 2 tablet(s) per day (Patient not taking: Reported on 1/29/2018) 10 tablet 0     cyclobenzaprine (FLEXERIL) 10 MG tablet Take 0.5-1 tablets (5-10 mg) by mouth 3 times daily as needed for muscle spasms (Patient not taking: Reported on 1/29/2018) 30 tablet 1     FAMOTIDINE PO Take 20 mg by mouth daily       omeprazole (PRILOSEC) 40 MG capsule TAKE 1 CAPSULE (40 MG) BY MOUTH DAILY TAKE 30-60 MINUTES BEFORE A MEAL. (Patient not taking: Reported on 10/11/2018) 90 capsule 1          Physical Exam:   Blood pressure (!) 159/95, pulse 72, temperature 98.3  F (36.8  C), temperature source Oral, weight 68.9 kg (151 lb 12.8 oz), last menstrual period 12/18/2004, SpO2 99 %, not currently breastfeeding.  Wt Readings from Last 4 Encounters:   10/11/18 68.9 kg (151 lb 12.8 oz)   04/04/18 70.3 kg (155 lb)   03/19/18 68.9 kg (152 lb)   01/29/18 69.3 kg (152 lb 12.8 oz)     Constitutional: WD/WN. Pleasant. In no acute distress.  Eyes: EOM intact, PERRLA, sclera anicteric, conj not injected  HEENT: No oral ulcers or thrush. Poor salivary pool.  Neck: No cervical LAP or thyromegaly  Chest: Clear to auscultation bilaterally  CV: RRR, no murmurs/  rubs or gallops. No edema, clubbing or cyanosis. Varicose veins.  GI: Abdomen is soft and non-tender.   MS: No synovitis. Cool joints. Tenderness over B/L greater trochanter. +Heberden nodes. Full ROM of the joints.   Skin: No skin rash, malar rash, livedo, alopecia, digital ulcers. + periungual erythema. Ridging of the nails.  Neuro: A&Ox3. Grossly non focal, muscular power 5/5 upper ane lower Extremities  Psych: NL affect and mood         Data:     Results for orders placed or performed in visit on 18   Echo stress test with definity    Narrative    [id number]  [id number]  [id number]  [id number]^JUSTUS^ROBERTO^           [facility]  [facility]-3rd Floor  [address] [place] [address]  Name: ELIZA VILLATORO  MRN: [id number]  : 1960  Study Date: 2018 03:32 PM  Age: 57 yrs  Gender: Female  [place] [facility] [place] For Study: Dyspnea on exertion  Ordering Physician: ROBERTO JEROME  Referring Physician: ROBERTO JEROME  Performed By: Jeffrey Omer RDCS     BSA: 1.7 m2  Height: 62 in  Weight: 155 lb  _____________________________________________________________________________  __     _____________________________________________________________________________  __        Interpretation Summary  Exercise stress echocardiogram with no inducible ischemia.     Target heart rate achieved. Normal blood pressure response to exercise.  No angina symptoms with exercise.  No ECG evidence of ischemia.  Normal segmental and global LV function with EF of approximately 55-60% at  rest; with exercise left ventricular cavity size decreases and LVEF increases  to 65-70%.  No stress induced regional wall motion abnormalities.  Average functional capacity for age.  Mildly dilated aortic root (3.8 cm at the Sinuses of Valsalva) and no  significant valvular dysfunction noted on screening 2D and  Doppler  examination.  _____________________________________________________________________________  __     Stress  There was a normal BP response to exercise.  Definity (NDC #12186-066-17) given intravenously.  Patient was given 5ml mixture of 1.5ml Definity and 8.5ml saline.  5 ml wasted.  IV start location R Upper arm .  Definity Expiration 04-01-19 .  Definity Lot # 6209 .  Limiting Symptom: None.  Peak MVO2 26.4 ml/kg/min .  Percent predicted MVO2 100 %.  RPP 25,752.  Maximum workload 125 biggs.  Target Heart Rate was achieved.  Exercise was stopped due to fatigue.  The patient did not exhibit any symptoms during exercise.  Normal blood pressure response with stress.     Stress Results                                       Maximum Predicted HR:   162 bpm             Target HR: 138 bpm        % Maximum Predicted HR: 91 %                             Stage  DurationHeart Rate  BP                                 (mm:ss)   (bpm)                         Baseline            71    133/87                           Peak    6:15     148    174/91                            Stress Duration:   6:15 mm:ss *                      Maximum Stress HR: 148 bpm *     Left Ventricle  Left ventricular systolic function is normal.     Aortic Valve  The aortic valve is normal in structure and function.        Mitral Valve  The mitral valve is normal in structure and function.     Tricuspid Valve  The tricuspid valve is normal in structure and function.     Right Ventricle  The right ventricular systolic function is normal.     Vessels  Mildly dilated ascending aorta. Sinuses of Valsalva measures 3.8 cm.     Pericardium  There is no pericardial effusion.     Procedure  Stress Echo Bike with two dimensional, color and spectral Doppler performed.  Contrast Definity.     _____________________________________________________________________________  __  MMode/2D Measurements & Calculations     asc Aorta Diam: 3.5 cm     Time Measurements      Doppler Measurements & Calculations     QLAB 2DQ/CMQ           _____________________________________________________________________________  __        Report approved by: Geri Kong 07/06/2018 04:37 PM          Recent Labs   Lab Test  01/06/17   1441  09/26/16   1627  08/29/14   1707  08/29/14   1706  03/05/14   1619  10/21/13   1428  06/27/13   1451   WBC   --    --    --   7.4  9.0   --   7.4   RBC   --    --    --   4.30  4.25   --   4.38   HGB  12.9  13.2   --   13.3  13.0  14.2  13.4   HCT   --    --    --   40.5  39.3   --   40.4   MCV   --    --    --   94  93   --   92   RDW   --    --    --   13.2  13.0   --   12.8   PLT   --    --    --   231  224   --   211   ALBUMIN   --    --    --   3.9  4.2  4.3  4.0   CRP   --    --   <2.9   --   <5.0  <5.0   --    BUN   --    --    --    --   19  18  17      Recent Labs   Lab Test  10/21/13   1428  06/27/13   1451  06/23/10   0942   TSH  1.24  0.85  0.88     Cyclic Cit Pept IgG/IgA   Date Value Ref Range Status   03/05/2014 <20  Interpretation:  Negative <20 UNITS Final     Hemoglobin   Date Value Ref Range Status   10/12/2017 13.3 11.7 - 15.7 g/dL Final   01/06/2017 12.9 11.7 - 15.7 g/dL Final   09/26/2016 13.2 11.7 - 15.7 g/dL Final     Urea Nitrogen   Date Value Ref Range Status   03/05/2014 19 7 - 30 mg/dL Final   10/21/2013 18 7 - 30 mg/dL Final   06/27/2013 17 7 - 30 mg/dL Final     Sed Rate   Date Value Ref Range Status   08/29/2014 11 0 - 30 mm/h Final   03/05/2014 14 0 - 30 mm/h Final     CRP Inflammation   Date Value Ref Range Status   08/29/2014 <2.9 0.0 - 8.0 mg/L Final   03/05/2014 <5.0 0.0 - 8.0 mg/L Final   10/21/2013 <5.0 0.0 - 8.0 mg/L Final     AST   Date Value Ref Range Status   10/12/2017 18 0 - 45 U/L Final   08/29/2014 19 0 - 45 U/L Final   03/05/2014 24 0 - 45 U/L Final     Albumin   Date Value Ref Range Status   08/29/2014 3.9 3.3 - 4.9 g/dL Final   03/05/2014 4.2 3.9 - 5.1 g/dL Final   10/21/2013 4.3 3.9 - 5.1 g/dL Final      Comment:     Reference range changed on 09/23/2008.     Alkaline Phosphatase   Date Value Ref Range Status   03/05/2014 82 40 - 150 U/L Final   10/21/2013 83 40 - 150 U/L Final   06/27/2013 82 40 - 150 U/L Final     ALT   Date Value Ref Range Status   10/12/2017 23 0 - 50 U/L Final   08/29/2014 22 0 - 50 U/L Final   03/05/2014 25 0 - 50 U/L Final     Rheumatoid Factor   Date Value Ref Range Status   10/21/2013 11 0 - 14 IU/mL Final     Recent Labs   Lab Test  10/12/17   1530  01/06/17   1441  09/26/16   1627  08/29/14   1706  03/05/14   1619  10/21/13   1428  06/27/13   1451   WBC  8.5   --    --   7.4  9.0   --   7.4   HGB  13.3  12.9  13.2  13.3  13.0  14.2  13.4   HCT  42.1   --    --   40.5  39.3   --   40.4   MCV  98   --    --   94  93   --   92   PLT  228   --    --   231  224   --   211   BUN   --    --    --    --   19  18  17   TSH   --    --    --    --    --   1.24  0.85   AST  18   --    --   19  24  48*  27   ALT  23   --    --   22  25  29  23   ALKPHOS   --    --    --    --   82  83  82     Reviewed Rheumatology lab flowsheet      Acacia Chandler MD

## 2018-10-11 NOTE — PATIENT INSTRUCTIONS
F/u with pulmonary    Flu shot today    Recommend shingrix vaccine    Recommend PT for hip bursitis, if no help contact me for bursitis shots    Labs locally    Return in a year

## 2018-10-17 DIAGNOSIS — Z23 ENCOUNTER FOR IMMUNIZATION: ICD-10-CM

## 2018-10-17 LAB
ALBUMIN UR-MCNC: NEGATIVE MG/DL
ALT SERPL W P-5'-P-CCNC: 20 U/L (ref 0–50)
AMORPH CRY #/AREA URNS HPF: ABNORMAL /HPF
APPEARANCE UR: CLEAR
BACTERIA #/AREA URNS HPF: ABNORMAL /HPF
BILIRUB UR QL STRIP: NEGATIVE
COLOR UR AUTO: YELLOW
CREAT SERPL-MCNC: 0.82 MG/DL (ref 0.52–1.04)
ERYTHROCYTE [DISTWIDTH] IN BLOOD BY AUTOMATED COUNT: 13.1 % (ref 10–15)
GFR SERPL CREATININE-BSD FRML MDRD: 72 ML/MIN/1.7M2
GLUCOSE UR STRIP-MCNC: NEGATIVE MG/DL
HCT VFR BLD AUTO: 43 % (ref 35–47)
HGB BLD-MCNC: 13.2 G/DL (ref 11.7–15.7)
HGB UR QL STRIP: ABNORMAL
KETONES UR STRIP-MCNC: NEGATIVE MG/DL
LEUKOCYTE ESTERASE UR QL STRIP: NEGATIVE
MCH RBC QN AUTO: 30.1 PG (ref 26.5–33)
MCHC RBC AUTO-ENTMCNC: 30.7 G/DL (ref 31.5–36.5)
MCV RBC AUTO: 98 FL (ref 78–100)
NITRATE UR QL: NEGATIVE
NON-SQ EPI CELLS #/AREA URNS LPF: ABNORMAL /LPF
PH UR STRIP: 7.5 PH (ref 5–7)
PLATELET # BLD AUTO: 262 10E9/L (ref 150–450)
RBC # BLD AUTO: 4.38 10E12/L (ref 3.8–5.2)
RBC #/AREA URNS AUTO: ABNORMAL /HPF
SOURCE: ABNORMAL
SP GR UR STRIP: 1.02 (ref 1–1.03)
UROBILINOGEN UR STRIP-ACNC: 0.2 EU/DL (ref 0.2–1)
WBC # BLD AUTO: 6.3 10E9/L (ref 4–11)
WBC #/AREA URNS AUTO: ABNORMAL /HPF

## 2018-10-17 PROCEDURE — 85027 COMPLETE CBC AUTOMATED: CPT | Performed by: INTERNAL MEDICINE

## 2018-10-17 PROCEDURE — 84460 ALANINE AMINO (ALT) (SGPT): CPT | Performed by: INTERNAL MEDICINE

## 2018-10-17 PROCEDURE — 81001 URINALYSIS AUTO W/SCOPE: CPT | Performed by: INTERNAL MEDICINE

## 2018-10-17 PROCEDURE — 36415 COLL VENOUS BLD VENIPUNCTURE: CPT | Performed by: INTERNAL MEDICINE

## 2018-10-17 PROCEDURE — 82565 ASSAY OF CREATININE: CPT | Performed by: INTERNAL MEDICINE

## 2018-10-17 NOTE — LETTER
Patient:  Sejal Devlin  :   1960  MRN:     [id number]        Ms.Joanne CIPRIANO Devlin  [address] [place] [address]        2018    Dear ,    We are writing to inform you of your test results. They are stable.      Results for orders placed or performed in visit on 10/17/18   Creatinine   Result Value Ref Range    Creatinine 0.82 0.52 - 1.04 mg/dL    GFR Estimate 72 >60 mL/min/1.7m2    GFR Estimate If Black 87 >60 mL/min/1.7m2   ALT   Result Value Ref Range    ALT 20 0 - 50 U/L   CBC with platelets   Result Value Ref Range    WBC 6.3 4.0 - 11.0 10e9/L    RBC Count 4.38 3.8 - 5.2 10e12/L    Hemoglobin 13.2 11.7 - 15.7 g/dL    Hematocrit 43.0 35.0 - 47.0 %    MCV 98 78 - 100 fl    MCH 30.1 26.5 - 33.0 pg    MCHC 30.7 (L) 31.5 - 36.5 g/dL    RDW 13.1 10.0 - 15.0 %    Platelet Count 262 150 - 450 10e9/L   UA with Microscopic reflex to Culture   Result Value Ref Range    Color Urine Yellow     Appearance Urine Clear     Glucose Urine Negative NEG^Negative mg/dL    Bilirubin Urine Negative NEG^Negative    Ketones Urine Negative NEG^Negative mg/dL    Specific Gravity Urine 1.020 1.003 - 1.035    pH Urine 7.5 (H) 5.0 - 7.0 pH    Protein Albumin Urine Negative NEG^Negative mg/dL    Urobilinogen Urine 0.2 0.2 - 1.0 EU/dL    Nitrite Urine Negative NEG^Negative    Blood Urine Trace (A) NEG^Negative    Leukocyte Esterase Urine Negative NEG^Negative    Source Midstream Urine     WBC Urine 0 - 5 OTO5^0 - 5 /HPF    RBC Urine 2-5 (A) OTO2^O - 2 /HPF    Squamous Epithelial /LPF Urine Moderate (A) FEW^Few /LPF    Bacteria Urine Few (A) NEG^Negative /HPF    Amorphous Crystals Many (A) NEG^Negative /HPF     Acacia Chandler MD

## 2018-10-19 ASSESSMENT — ENCOUNTER SYMPTOMS
SNORES LOUDLY: 1
DIARRHEA: 1
JAUNDICE: 0
BLOOD IN STOOL: 0
VOMITING: 0
CONSTIPATION: 0
RECTAL PAIN: 0
BLOATING: 0
COUGH DISTURBING SLEEP: 0
WHEEZING: 1
DYSPNEA ON EXERTION: 1
SHORTNESS OF BREATH: 1
COUGH: 1
HEARTBURN: 0
BOWEL INCONTINENCE: 1
NAUSEA: 0
ABDOMINAL PAIN: 0
HEMOPTYSIS: 0
POSTURAL DYSPNEA: 0
SPUTUM PRODUCTION: 1

## 2018-10-22 ENCOUNTER — OFFICE VISIT (OUTPATIENT)
Dept: FAMILY MEDICINE | Facility: CLINIC | Age: 58
End: 2018-10-22
Payer: COMMERCIAL

## 2018-10-22 VITALS
OXYGEN SATURATION: 100 % | WEIGHT: 149 LBS | TEMPERATURE: 97.4 F | SYSTOLIC BLOOD PRESSURE: 133 MMHG | DIASTOLIC BLOOD PRESSURE: 86 MMHG | HEART RATE: 73 BPM | BODY MASS INDEX: 26.82 KG/M2 | RESPIRATION RATE: 18 BRPM

## 2018-10-22 DIAGNOSIS — M81.0 OSTEOPOROSIS, UNSPECIFIED OSTEOPOROSIS TYPE, UNSPECIFIED PATHOLOGICAL FRACTURE PRESENCE: ICD-10-CM

## 2018-10-22 DIAGNOSIS — I10 BENIGN ESSENTIAL HYPERTENSION: Primary | ICD-10-CM

## 2018-10-22 DIAGNOSIS — R06.02 SOB (SHORTNESS OF BREATH): Primary | ICD-10-CM

## 2018-10-22 DIAGNOSIS — Z13.220 LIPID SCREENING: ICD-10-CM

## 2018-10-22 PROCEDURE — 99213 OFFICE O/P EST LOW 20 MIN: CPT | Performed by: FAMILY MEDICINE

## 2018-10-22 NOTE — PATIENT INSTRUCTIONS
"High Blood Pressure (Essential Hypertension)   What is essential hypertension?   Hypertension is the term for blood pressure that is consistently higher than normal. Hypertension is called essential or primary when no cause for the high blood pressure can be found. (When the cause of hypertension is known, such as kidney disease and tumors, it is called secondary hypertension.) About 95% of all people with high blood pressure have essential hypertension.   Normal blood pressure ranges up to 120/80 (\"120 over 80\") but blood pressure can rise and fall with exercise, rest, or emotions. The pressures are measured in millimeters of mercury. The upper number (120) is the pressure when the heart pushes blood out to the rest of the body (systolic pressure). The bottom number (80) is the pressure when the heart rests between beats (diastolic pressure).       Healthy blood pressure is less than 120/80.     Pre-high blood pressure (prehypertension) is from 120/80 to 139/90.     Stage I high blood pressure ranges from 140/90 to 159/99.     Stage II high blood pressure is over 160/100.     If repeated checks of your blood pressure show that it is higher than 140/90, you have hypertension.   Why is high blood pressure a problem?   When your blood pressure is high, your heart has to work harder just to pump a normal amount of blood through your body. The higher pressure in your arteries may cause them to weaken and bleed, resulting in a stroke. Over time, blood vessels may become hardened. This often occurs as people age. High blood pressure speeds this process. Blood vessel damage is bad because hardened or narrowed arteries may be unable to supply the amount of blood the body's organs need. The higher artery pressure may lead to atherosclerosis, in which deposits of cholesterol, fatty substances, and blood cells clog up an artery. Atherosclerosis is the leading cause of heart attacks. It can also cause strokes.   The added " workload on the heart causes thickening of the heart muscle. Over time, the thickening damages the heart muscle so that it can no longer pump normally. This can lead to a disease called heart failure. Your kidneys or eyes may also be damaged. The longer you have high blood pressure and the higher it is, the more likely it is you will develop problems.   How does it occur?  There are no clear causes of essential hypertension. However, many different factors can increase blood pressure, such:    being overweight     smoking     eating a diet high in salt     drinking a lot of alcohol.   Other important factors include:     Race.  Americans are more likely to develop high blood pressure.     Gender. Males have a greater chance of developing high blood pressure than women until age 55. However, after the age of 75, women are more likely to develop high blood pressure than men.     Heredity. If your parents had high blood pressure, you are more at risk.     Age. The older you get, the more likely you are to develop high blood pressure.     Some medicines increase blood pressure. Stress and drinking caffeine can make blood pressure go up for a while, but the long-term effects aren't yet clear.   What are the symptoms?   One of the sneaky things about high blood pressure is that you can have it for a long time without symptoms. That's why it is important for you have your blood pressure checked at least once a year.   If you do have symptoms, they may be:     Headaches, getting tired easily, dizziness, nosebleeds, chest pain, shortness of breath.     Although it happens rarely, the first symptom may be a stroke.   How is it diagnosed?   Because it is such a common problem, blood pressure is checked at most health care visits. High blood pressure is usually discovered during one of these visits. If your blood pressure is high, you will be asked to return for follow-up checks. If your pressure stays high for 3 visits,  you probably have hypertension.   Your health care provider will ask about your life situation, what you eat and drink, and if high blood pressure runs in your family. You may have urine and blood tests. Your provider may order a chest x-ray and an electrocardiogram (ECG). You may be asked to use a portable blood-pressure measuring device, which will take your pressure at different times during day and night. All of this testing is done to look for a possible cause of your high blood pressure.   How is it treated?   If your blood pressure is above normal (prehypertension), you may be able to bring it down to a normal level without medicine. Weight loss, changes in your diet, and exercise may be the only treatment you need. If you also have diabetes, you may need additional treatment.   If these lifestyle changes do not lower your blood pressure enough, your health care provider may prescribe medicine. Some of the types of medicines that can help are diuretics, beta blockers, ACE inhibitors, calcium channel blockers, and vasodilators. These medicines work in different ways. Many people need to take two or more medicines to bring their blood pressure down to a healthy level.     When you start taking medicine, it is important to:     Take the medicine regularly, exactly as prescribed.     Tell your health care provider about any side effects right away.     Have regular follow-up visits with your health care provider.     It may not be possible to know at first which drug or mix of drugs will work best for you. It may take several weeks or months to find the best treatment for you.   How long will the effects last?   You may need treatment for high blood pressure for the rest of your life. However, proper treatment can control your blood pressure and help prevent or delay problems. If you already have some complications, lowering your blood pressure may make their effects less severe.   How can I take care of myself?    Your treatment will be much more effective if you follow these guidelines:     Always follow your health care provider's instructions for taking medicines. Don't take less medicine or stop taking medicine without talking to your provider first. It can be dangerous to suddenly stop taking blood pressure medicine. Also, do not increase your dosage of any medicine without first talking with your provider.     Check your blood pressure (or have it checked) as often as your health care provider advises. Keep a chart of the readings.     Do not smoke.     Follow the DASH diet. This diet is low in fat, cholesterol, red meat, and sweets. It emphasizes fruits, vegetables, and low-fat dairy foods. The DASH diet also includes whole-grain products, fish, poultry, and nuts.     Use less salt. Check the levels of sodium listed on food labels. Avoid canned and prepared foods unless the label says no salt is added.     Get regular exercise, according to your health care provider's advice. For example, you might walk, bike, or swim at least 30 minutes 3 to 5 times a week.     Limit the amount of alcohol you drink. If you are a man, drink no more than two 1-ounce drinks of hard liquor, two beers, or two 6-ounce glasses of wine a day. Women should have no more than 1 ounce of liquor, one beer, or one glass of wine a day.     Limit the amount of caffeine you drink.     Try to reduce the stress in your life or learn how to deal better with situations that make you feel anxious.     Ask your health care provider or pharmacist for information about the drugs you are taking.     Lose weight if you need to.     Tell your health care provider about any side effects you have from your medicines.   Developed by Gaudencio Morris MD; Cristina Solorzano RN, [place]; and [employer] [facility].                   Last modified: 2004-05-24      This content is reviewed periodically and is subject to change as new health information becomes  available. The information is intended to inform and educate and is not a replacement for medical evaluation, advice, diagnosis or treatment by a healthcare professional.   Copyright   2004 [employer] and/or one of its subsidiaries. All Rights Reserved.

## 2018-10-22 NOTE — MR AVS SNAPSHOT
"              After Visit Summary   10/22/2018    Sejal Devlin    MRN: [id number]           Patient Information     Date Of Birth          1960        Visit Information        Provider Department      10/22/2018 9:40 AM Jackie Joshua MD [facility]        Today's Diagnoses     Lipid screening    -  1    Osteoporosis, unspecified osteoporosis type, unspecified pathological fracture presence          Care Instructions    High Blood Pressure (Essential Hypertension)   What is essential hypertension?   Hypertension is the term for blood pressure that is consistently higher than normal. Hypertension is called essential or primary when no cause for the high blood pressure can be found. (When the cause of hypertension is known, such as kidney disease and tumors, it is called secondary hypertension.) About 95% of all people with high blood pressure have essential hypertension.   Normal blood pressure ranges up to 120/80 (\"120 over 80\") but blood pressure can rise and fall with exercise, rest, or emotions. The pressures are measured in millimeters of mercury. The upper number (120) is the pressure when the heart pushes blood out to the rest of the body (systolic pressure). The bottom number (80) is the pressure when the heart rests between beats (diastolic pressure).       Healthy blood pressure is less than 120/80.     Pre-high blood pressure (prehypertension) is from 120/80 to 139/90.     Stage I high blood pressure ranges from 140/90 to 159/99.     Stage II high blood pressure is over 160/100.     If repeated checks of your blood pressure show that it is higher than 140/90, you have hypertension.   Why is high blood pressure a problem?   When your blood pressure is high, your heart has to work harder just to pump a normal amount of blood through your body. The higher pressure in your arteries may cause them to weaken and bleed, resulting in a stroke. Over time, blood vessels may become " hardened. This often occurs as people age. High blood pressure speeds this process. Blood vessel damage is bad because hardened or narrowed arteries may be unable to supply the amount of blood the body's organs need. The higher artery pressure may lead to atherosclerosis, in which deposits of cholesterol, fatty substances, and blood cells clog up an artery. Atherosclerosis is the leading cause of heart attacks. It can also cause strokes.   The added workload on the heart causes thickening of the heart muscle. Over time, the thickening damages the heart muscle so that it can no longer pump normally. This can lead to a disease called heart failure. Your kidneys or eyes may also be damaged. The longer you have high blood pressure and the higher it is, the more likely it is you will develop problems.   How does it occur?  There are no clear causes of essential hypertension. However, many different factors can increase blood pressure, such:    being overweight     smoking     eating a diet high in salt     drinking a lot of alcohol.   Other important factors include:     Race.  Americans are more likely to develop high blood pressure.     Gender. Males have a greater chance of developing high blood pressure than women until age 55. However, after the age of 75, women are more likely to develop high blood pressure than men.     Heredity. If your parents had high blood pressure, you are more at risk.     Age. The older you get, the more likely you are to develop high blood pressure.     Some medicines increase blood pressure. Stress and drinking caffeine can make blood pressure go up for a while, but the long-term effects aren't yet clear.   What are the symptoms?   One of the sneaky things about high blood pressure is that you can have it for a long time without symptoms. That's why it is important for you have your blood pressure checked at least once a year.   If you do have symptoms, they may be:     Headaches,  getting tired easily, dizziness, nosebleeds, chest pain, shortness of breath.     Although it happens rarely, the first symptom may be a stroke.   How is it diagnosed?   Because it is such a common problem, blood pressure is checked at most health care visits. High blood pressure is usually discovered during one of these visits. If your blood pressure is high, you will be asked to return for follow-up checks. If your pressure stays high for 3 visits, you probably have hypertension.   Your health care provider will ask about your life situation, what you eat and drink, and if high blood pressure runs in your family. You may have urine and blood tests. Your provider may order a chest x-ray and an electrocardiogram (ECG). You may be asked to use a portable blood-pressure measuring device, which will take your pressure at different times during day and night. All of this testing is done to look for a possible cause of your high blood pressure.   How is it treated?   If your blood pressure is above normal (prehypertension), you may be able to bring it down to a normal level without medicine. Weight loss, changes in your diet, and exercise may be the only treatment you need. If you also have diabetes, you may need additional treatment.   If these lifestyle changes do not lower your blood pressure enough, your health care provider may prescribe medicine. Some of the types of medicines that can help are diuretics, beta blockers, ACE inhibitors, calcium channel blockers, and vasodilators. These medicines work in different ways. Many people need to take two or more medicines to bring their blood pressure down to a healthy level.     When you start taking medicine, it is important to:     Take the medicine regularly, exactly as prescribed.     Tell your health care provider about any side effects right away.     Have regular follow-up visits with your health care provider.     It may not be possible to know at first which drug  or mix of drugs will work best for you. It may take several weeks or months to find the best treatment for you.   How long will the effects last?   You may need treatment for high blood pressure for the rest of your life. However, proper treatment can control your blood pressure and help prevent or delay problems. If you already have some complications, lowering your blood pressure may make their effects less severe.   How can I take care of myself?   Your treatment will be much more effective if you follow these guidelines:     Always follow your health care provider's instructions for taking medicines. Don't take less medicine or stop taking medicine without talking to your provider first. It can be dangerous to suddenly stop taking blood pressure medicine. Also, do not increase your dosage of any medicine without first talking with your provider.     Check your blood pressure (or have it checked) as often as your health care provider advises. Keep a chart of the readings.     Do not smoke.     Follow the DASH diet. This diet is low in fat, cholesterol, red meat, and sweets. It emphasizes fruits, vegetables, and low-fat dairy foods. The DASH diet also includes whole-grain products, fish, poultry, and nuts.     Use less salt. Check the levels of sodium listed on food labels. Avoid canned and prepared foods unless the label says no salt is added.     Get regular exercise, according to your health care provider's advice. For example, you might walk, bike, or swim at least 30 minutes 3 to 5 times a week.     Limit the amount of alcohol you drink. If you are a man, drink no more than two 1-ounce drinks of hard liquor, two beers, or two 6-ounce glasses of wine a day. Women should have no more than 1 ounce of liquor, one beer, or one glass of wine a day.     Limit the amount of caffeine you drink.     Try to reduce the stress in your life or learn how to deal better with situations that make you feel anxious.     Ask  your health care provider or pharmacist for information about the drugs you are taking.     Lose weight if you need to.     Tell your health care provider about any side effects you have from your medicines.   Developed by Gaudencio Morris MD; Cristina Solorzano, RN, [place]; and [employer].                   Last modified: 2004-05-24      This content is reviewed periodically and is subject to change as new health information becomes available. The information is intended to inform and educate and is not a replacement for medical evaluation, advice, diagnosis or treatment by a healthcare professional.   Copyright   2004 [employer] and/or one of its subsidiaries. All Rights Reserved.                               Follow-ups after your visit        Your next 10 appointments already scheduled     Oct 26, 2018  1:00 PM CDT   FULL PULMONARY FUNCTION with  PFL [facility] Pulmonary Function Testing ([facility])    [address]  3rd Floor  [facility] [id number] [phone]            Oct 26, 2018  1:00 PM CDT   (Arrive by 12:45 PM)   New Patient Visit with Kimberly Saez MD   [facility] ([facility])    [address]  [address] [place] [id number] [phone]              Future tests that were ordered for you today     Open Future Orders        Priority Expected Expires Ordered    DX Hip/Pelvis/Spine Routine  10/22/2019 10/22/2018    Lipid Profile Routine  12/22/2018 10/22/2018    Glucose Routine  12/22/2018 10/22/2018            Who to contact     If you have questions or need follow up information about today's clinic visit or your schedule please contact [facility] directly at [phone].  Normal or non-critical lab and imaging results will be communicated to you by MyChart, letter or phone within 4 business days after the clinic has received the  results. If you do not hear from us within 7 days, please contact the clinic through [employer] or phone. If you have a critical or abnormal lab result, we will notify you by phone as soon as possible.  Submit refill requests through [employer] or call your pharmacy and they will forward the refill request to us. Please allow 3 business days for your refill to be completed.          Additional Information About Your Visit        [facility] Information     [employer] gives you secure access to your electronic health record. If you see a primary care provider, you can also send messages to your care team and make appointments. If you have questions, please call your primary care clinic.  If you do not have a primary care provider, please call [phone] and they will assist you.        Care EveryWhere ID     This is your Care EveryWhere ID. This could be used by other organizations to access your [facility] medical records  [id number]        Your Vitals Were     Pulse Temperature Respirations Last Period Pulse Oximetry BMI (Body Mass Index)    73 97.4  F (36.3  C) (Oral) 18 12/18/2004 100% 26.82 kg/m2       Blood Pressure from Last 3 Encounters:   10/22/18 133/86   10/11/18 (!) 159/95   04/11/18 122/90    Weight from Last 3 Encounters:   10/22/18 149 lb (67.6 kg)   10/11/18 151 lb 12.8 oz (68.9 kg)   04/04/18 155 lb (70.3 kg)               Primary Care Provider Office Phone # Fax #    JONATAN Ramírez [facility] [phone] [address] [place] [address]        Equal Access to Services     [facility] : Hadii aad ku hadasho Soomaali, waaxda luqadaha, qaybta kaalmada adeegyadarian, carly veliz . So [facility] [phone].    ATENCIÓN: Si habla español, tiene a badillo disposición servicios gratuitos de asistencia lingüística. Llame al [phone].    We comply with applicable federal civil rights laws and [place] laws. We do not discriminate on the basis of race, color,  national origin, age, disability, sex, sexual orientation, or gender identity.            Thank you!     Thank you for choosing [facility]  for your care. Our goal is always to provide you with excellent care. Hearing back from our patients is one way we can continue to improve our services. Please take a few minutes to complete the written survey that you may receive in the mail after your visit with us. Thank you!             Your Updated Medication List - Protect others around you: Learn how to safely use, store and throw away your medicines at www.disposemymeds.org.          This list is accurate as of 10/22/18 10:28 AM.  Always use your most recent med list.                   Brand Name Dispense Instructions for use Diagnosis    acetaminophen-codeine 300-30 MG per tablet    TYLENOL #3    10 tablet    Take 1 tablet by mouth every 8 hours as needed for pain maximum 2 tablet(s) per day    Cervicalgia       cyclobenzaprine 10 MG tablet    FLEXERIL    30 tablet    Take 0.5-1 tablets (5-10 mg) by mouth 3 times daily as needed for muscle spasms    Cervicalgia       FAMOTIDINE PO      Take 20 mg by mouth daily        GNP GARLIC EXTRACT PO           omeprazole 40 MG capsule    priLOSEC    90 capsule    TAKE 1 CAPSULE (40 MG) BY MOUTH DAILY TAKE 30-60 MINUTES BEFORE A MEAL.    Gastroesophageal reflux disease, esophagitis presence not specified       VITAMIN D (CHOLECALCIFEROL) PO      Take by mouth daily

## 2018-10-22 NOTE — PROGRESS NOTES
SUBJECTIVE:   Sejal Devlin is a 58 year old female who presents to clinic today for the following health issues:    1. Hypertension: Blood pressure has been high: 134/77, 153/79, 156/82, checking BP at the store.  Asking about medication and other changes she can make to reduce blood pressure.  She walks 2 miles per day.  She does like to eat salty foods.  She drinks alcohol about 2 drinks per day regularly.  She does not smoke.      2. SOB: seeing a pulmonologist this Friday. Both at rest and with exertion. Has been longstanding, has flares but seems to be progressively worsening in between the flares. Is in a drum group and has difficulty standing and playing drums for the last two years (had no problems when she started in the group 10 years ago).       3. Sjogren's is mostly asymptomatic. Seeing rheumatologist, well-controlled.    4.  Lung nodule: overdue for recheck with CT scan; patient wants to review with pulmonology first .  Health maintenance:  Due for DEXA scan and colonoscopy    Medications: only taking garlic and vitamin D. Can remove the others from the list.    Problem list and histories reviewed & adjusted, as indicated.  Additional history: as documented    Patient Active Problem List   Diagnosis     Osteopenia     Hyperlipidemia LDL goal <160     Family history of colon cancer     Rash     Circulation problem     Dry mouth     Vitamin D deficiency     Sjogren's syndrome (H)     Leo esophagus     Past Surgical History:   Procedure Laterality Date     APPENDECTOMY  1977     COLONOSCOPY  2013     ESOPHAGOSCOPY, GASTROSCOPY, DUODENOSCOPY (EGD), COMBINED N/A 4/11/2018    Procedure: COMBINED ESOPHAGOSCOPY, GASTROSCOPY, DUODENOSCOPY (EGD);  egd;  Surgeon: Poonam Alatorre MD;  Location: [facility]     ESOPHAGOSCOPY, GASTROSCOPY, DUODENOSCOPY (EGD), COMBINED N/A 4/11/2018    Procedure: COMBINED ESOPHAGOSCOPY, GASTROSCOPY, DUODENOSCOPY (EGD), BIOPSY SINGLE OR MULTIPLE;;  Surgeon: Poonam Alatorre MD;   Location: [facility] OR     GYN SURGERY           HC REMOVAL GALLBLADDER  1998    Cholecystectomy     ORTHOPEDIC SURGERY  2016    Broken right radius     SURGICAL HISTORY OF -       c- section       Social History   Substance Use Topics     Smoking status: Former Smoker     Packs/day: 1.00     Years: 10.00     Types: Cigarettes     Start date: 2002     Quit date: 2008     Smokeless tobacco: Never Used      Comment: smoked age [age], quit x 1 yr and resumed then quit after one yr     Alcohol use Yes      Comment: one beer a day     Family History   Problem Relation Age of Onset     C.A.D. Mother      50s     Cancer - colorectal Mother       age 64, diagnosed age 54     Circulatory Mother      Coronary Artery Disease Mother      Colon Cancer Mother      C.A.D. Father      Eye Disorder Father      glaucoma     Lipids Father      Cancer Father      kidney     Hyperlipidemia Father      Substance Abuse Brother      Cardiovascular Brother      MI, stent at 52     Connective Tissue Disorder Other      cousin on father side has lupus     Coronary Artery Disease Brother      Coronary Artery Disease Brother      Substance Abuse Brother            Reviewed and updated as needed this visit by clinical staff  Tobacco  Allergies  Meds  Med Hx  Surg Hx  Fam Hx  Soc Hx      Reviewed and updated as needed this visit by Provider         ROS:  Constitutional, HEENT, cardiovascular, pulmonary, GI, , musculoskeletal, neuro, skin, endocrine and psych systems are negative, except as otherwise noted.    OBJECTIVE:     /86 (BP Location: Right arm, Patient Position: Sitting, Cuff Size: Adult Regular)  Pulse 73  Temp 97.4  F (36.3  C) (Oral)  Resp 18  Wt 149 lb (67.6 kg)  LMP 2004  SpO2 100%  BMI 26.82 kg/m2  Body mass index is 26.82 kg/(m^2).  GENERAL: healthy, alert and no distress  NECK: no adenopathy, no asymmetry, masses, or scars and thyroid normal to palpation  RESP: lungs clear to  auscultation - no rales, rhonchi or wheezes  CV: regular rate and rhythm, normal S1 S2, no S3 or S4, no murmur, click or rub, no peripheral edema and peripheral pulses strong  MS: no gross musculoskeletal defects noted, no edema    Diagnostic Test Results:  none     ASSESSMENT/PLAN:       1.  HTN:  New diagnosis.  BP today improved compared to recent, but would still meet criteria based on BPs she sent in, including the past two visits within our system.  Advised lifestyle modifications first, primarily decreasing EtOH intake and decreasing salt.  I recommended fasting labs and BP recheck in 1 month.  If still high, would recommend an OV to start an antihypertensive.  I offered her PGen study information; she was not interested.      Lipid screening    - Lipid Profile; Future  - Glucose; Future    2. Osteoporosis, unspecified osteoporosis type, unspecified pathological fracture presence  Due for recheck  - DX Hip/Pelvis/Spine; Future      Jackie Joshua MD  [facility]

## 2018-10-26 ENCOUNTER — RADIANT APPOINTMENT (OUTPATIENT)
Dept: GENERAL RADIOLOGY | Facility: CLINIC | Age: 58
End: 2018-10-26
Payer: COMMERCIAL

## 2018-10-26 ENCOUNTER — OFFICE VISIT (OUTPATIENT)
Dept: PULMONOLOGY | Facility: CLINIC | Age: 58
End: 2018-10-26
Attending: INTERNAL MEDICINE
Payer: COMMERCIAL

## 2018-10-26 VITALS
RESPIRATION RATE: 16 BRPM | OXYGEN SATURATION: 100 % | SYSTOLIC BLOOD PRESSURE: 168 MMHG | DIASTOLIC BLOOD PRESSURE: 95 MMHG | WEIGHT: 149 LBS | HEART RATE: 70 BPM | HEIGHT: 63 IN | BODY MASS INDEX: 26.4 KG/M2

## 2018-10-26 DIAGNOSIS — R06.02 SOB (SHORTNESS OF BREATH): ICD-10-CM

## 2018-10-26 DIAGNOSIS — R91.8 PULMONARY NODULES: Primary | ICD-10-CM

## 2018-10-26 DIAGNOSIS — R06.09 DYSPNEA ON EXERTION: ICD-10-CM

## 2018-10-26 DIAGNOSIS — R06.02 SOB (SHORTNESS OF BREATH): Primary | ICD-10-CM

## 2018-10-26 PROCEDURE — G0463 HOSPITAL OUTPT CLINIC VISIT: HCPCS | Mod: ZF

## 2018-10-26 ASSESSMENT — PAIN SCALES - GENERAL: PAINLEVEL: NO PAIN (0)

## 2018-10-26 NOTE — PROGRESS NOTES
"Pulmonary Clinic Initial Visit Note    CC: shortness of breath      HPI:     Ms. Sejal Devlin is a 57yo female with a history of Sjogren's syndrome dx'ed via lower lip biopsy in May of 2014 who presents to pulmonary clinic for evaluation of dyspnea on exertion.     Ms. Jeronimo reports at least a 1 year history of multiple \"episodes\" of feeling like she cannot get enough air.  This occurs about once every 1-2 months, for 1-2 weeks at a time.  During this time, she feels like she has to brace herself when sitting or standing to get enough air.  She reports that during her regular activities/hobbies like drumming, or playing in bell choir, she has to sit instead of stand.  This was previously not an issue for her.  This has worsened in frequency and intensity over the past several months.     The patient was seen by [place] [facility] in August of 2017 for the same issue; at that visit she was noted to have normal pulmonary function and FeNO.  Her cough and dyspnea were thought to be related to post infectious airway inflammation and upper airway cough syndrome.  Supportive management was prescribed and the patient denies significant improvement.     Regarding these episodes, she denies any triggers or therapies which have been effective. She has no associated wheezing, chest pain, lightheadedness, dizziness, or chest pressure which accompanies her symptoms.  She is very tearful regarding this episodes and has to cancel plans and alter her activities due to these episodes.  She notes that currently she is feeling well, and laments that she isn't in the midst of an episode.  Of note, she reports that she bought a pulse oximeter and reports that her O2 saturation is normal during these episodes.   Of note, she finds activities like drumming the most difficult during the flare, she feels that when she walks things are a bit less severe.      She has about a 30 pack year smoking history, and quit in 2008.  She denies any other drug " use.  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  She has a cat at home but is not allergic to it.  She has no down pillows, bird exposures, or other known exposures.  She denies recent travel.      She denies any recent fevers, sweats, or chills.  Regarding her Sjogren's, this was diagnosed via lip biopsy in May of 2014 after experiencing a salty taste in her mouth.  Scl 70, RNP, Smith, SSA, and SSB antibodies were negative at this time and se denies any other significant symptoms related to her Sjogren's.        PMH:  Past Medical History:   Diagnosis Date     Diverticulosis of colon 2008    as noted on colonoscopy     Family history of colon cancer 5/22/2012     Family history of colon cancer      Family history of colon cancer      Family history of colon cancer      Postmenopausal 2005       Allergies:  Allergies   Allergen Reactions     No Known Drug Allergies      Seasonal Allergies        Social History:  Social History     Social History     Marital status:      Spouse name: N/A     Number of children: 3     Years of education: N/A     Occupational History      [employer] secretary, and PCA      [employer]      Social History Main Topics     Smoking status: Former Smoker     Packs/day: 1.00     Years: 10.00     Types: Cigarettes     Start date: 7/1/2002     Quit date: 1/1/2008     Smokeless tobacco: Never Used      Comment: smoked age [age], quit x 1 yr and resumed then quit after one yr     Alcohol use Yes      Comment: one beer a day     Drug use: No     Sexual activity: Not Currently     Partners: Male     Birth control/ protection: Post-menopausal      Comment: Pt's  has a vasectomy     Other Topics Concern     Parent/Sibling W/ Cabg, Mi Or Angioplasty Before 65f 55m? Yes     Brother, age 55     Social History Narrative    Caffeine intake/servings daily - 2-3    Calcium intake/servings daily - 2    Exercise 7 times  weekly - describe walking    Sunscreen used - No    Seatbelts used - Yes    Guns stored in the home - No    Self Breast Exam - No    Pap test up to date -  Yes, 2008    Eye exam up to date -  Yes, 2007    Dental exam up to date -  Yes, 2009    DEXA scan up to date -  No    Flex Sig/Colonoscopy up to date -  Yes, 2008    Mammography up to date -  Yes, 2009    Immunizations reviewed and up to date - Yes, td     Abuse: Current or Past (Physical, Sexual or Emotional) - No    Do you feel safe in your environment - Yes    Do you cope well with stress - Yes    Do you suffer from insomnia - No    2010                                           Medications:  Current Outpatient Prescriptions   Medication Sig Dispense Refill     acetaminophen-codeine (TYLENOL #3) 300-30 MG per tablet Take 1 tablet by mouth every 8 hours as needed for pain maximum 2 tablet(s) per day (Patient not taking: Reported on 2018) 10 tablet 0     cyclobenzaprine (FLEXERIL) 10 MG tablet Take 0.5-1 tablets (5-10 mg) by mouth 3 times daily as needed for muscle spasms (Patient not taking: Reported on 2018) 30 tablet 1     FAMOTIDINE PO Take 20 mg by mouth daily       GNP GARLIC EXTRACT PO        omeprazole (PRILOSEC) 40 MG capsule TAKE 1 CAPSULE (40 MG) BY MOUTH DAILY TAKE 30-60 MINUTES BEFORE A MEAL. (Patient not taking: Reported on 10/11/2018) 90 capsule 1     VITAMIN D, CHOLECALCIFEROL, PO Take by mouth daily         Family History:  Family History   Problem Relation Age of Onset     C.A.D. Mother      50s     Cancer - colorectal Mother       age 64, diagnosed age 54     Circulatory Mother      Coronary Artery Disease Mother      Colon Cancer Mother      C.A.D. Father      Eye Disorder Father      glaucoma     Lipids Father      Cancer Father      kidney     Hyperlipidemia Father      Substance Abuse Brother      Cardiovascular Brother      MI, stent at 52     Connective Tissue Disorder Other      cousin on father  "side has lupus     Coronary Artery Disease Brother      Coronary Artery Disease Brother      Substance Abuse Brother        ROS: Complete 10 point ROS negative unless mentioned in HPI    Physical Exam:  BP (!) 168/95 (BP Location: Right arm, Patient Position: Chair, Cuff Size: Adult Regular)  Pulse 70  Resp 16  Ht 1.588 m (5' 2.52\")  Wt 67.6 kg (149 lb)  LMP 12/18/2004  SpO2 100%  BMI 26.8 kg/m2    General: Sitting in the chair in NAD  HEENT: anicteric, normal oral mucosa, normal nasal turbinates  Neck: no palpable lymphadenopathy, no JVD noted.   Chest: CTAB, no wheezing  Cardiac: RRR no murmurs  Abdomen: Soft, flat, non tender, active BS  Extremities: No LE Edema, cyanosis, or clubbing  Neuro: A&Ox3, no focal defecits  Skin: no rash noted        Labs and Radiology:    Stress ECHO 7/6/2018:  Interpretation Summary  Exercise stress echocardiogram with no inducible ischemia.     Target heart rate achieved. Normal blood pressure response to exercise.  No angina symptoms with exercise.  No ECG evidence of ischemia.  Normal segmental and global LV function with EF of approximately 55-60% at  rest; with exercise left ventricular cavity size decreases and LVEF increases  to 65-70%.  No stress induced regional wall motion abnormalities.  Average functional capacity for age.  Mildly dilated aortic root (3.8 cm at the Sinuses of Valsalva) and no  significant valvular dysfunction noted on screening 2D and Doppler  examination.    CT Chest from July 2017 personally reviewed: LLL pulmonary nodule noted.  No obvious lung parenchymal abormalities.     CXR today reviewed- no obvious abnormalities   PFT's:    PFTs and diffusing capacity are within normal limits    Assessment and Plan:  Sejal Devlin is a 58 year old female with a history of Sjogren's who presents with a progressive history of prolonged episodes of feeling as if she's not getting enough air.  Unlikely a cardiac issue given her normal stress ECHO in July, " however, we don't have a resting ECHO to assess her RV.  Her lung parenchyma was quite normal on her CT in 2017, and her PFTs remain completely normal as well, which argue against an organic pulmonary issue.  However, she does have Sjogren's which raises the possibility of an indolent ILD which may not have manifested on her CT last year.   The patient acknowledges that she's open to considering anxiety or psychological etiologies of these episodes, but feels strongly that there is something wrong.  She is tearful today as we discuss her normal PFT and imaging results, and unfortunately I don't have a clear diagnosis for her at this point- though I do not think this is a life threatening illness.      1. Dyspneic episodes without any other associated symptoms: etiology unclear at this point.  Due to possibility of Sjogren's related ILD, will obtain further eval:   - CT chest  - resting TTE to assess RV (anticipate this will be wnl given normal DLCO on PFTS)  - 6MWT on return  - pt declines autoimmune studies at this time due to cost  - pt will call PCP when she is in the midst of a flare for evaluation during this time to r/o any wheezing or other abnormalities    2. LLL pulmonary nodule: 4mm identified last year.  +30 pack year smoking history, quit 10 years ago.   - repeat CT chest    Flu shot completed    RTC in 3 months     Seen and staffed with Dr. Shane Saez MD  Pulmonary and Critical Care Fellow    Answers for HPI/ROS submitted by the patient on 10/19/2018   General Symptoms: No  Skin Symptoms: No  HENT Symptoms: No  EYE SYMPTOMS: No  HEART SYMPTOMS: No  LUNG SYMPTOMS: Yes  INTESTINAL SYMPTOMS: Yes  URINARY SYMPTOMS: No  GYNECOLOGIC SYMPTOMS: No  BREAST SYMPTOMS: No  SKELETAL SYMPTOMS: No  BLOOD SYMPTOMS: No  NERVOUS SYSTEM SYMPTOMS: No  MENTAL HEALTH SYMPTOMS: No  Cough: Yes  Sputum or phlegm: Yes  Coughing up blood: No  Difficulty breating or shortness of breath: Yes  Snoring:  Yes  Wheezing: Yes  Difficulty breathing on exertion: Yes  Nighttime Cough: No  Difficulty breathing when lying flat: No  Heart burn or indigestion: No  Nausea: No  Vomiting: No  Abdominal pain: No  Bloating: No  Constipation: No  Diarrhea: Yes  Blood in stool: No  Black stools: No  Rectal or Anal pain: No  Fecal incontinence: Yes  Yellowing of skin or eyes: No  Vomit with blood: No  Change in stools: Yes      Pulmonary Attending Note:    I saw, examined the patient and reviewed the chart.  I agree with the fellow's note which we formulated together and represent our mutual findings, assessment and plan. Briefly, the patient is seen for dyspnea. Has sjogren's. Will do CT chest, TTE and 6min walk test prior to next clinic visit.    CALVIN Lynn MD

## 2018-10-26 NOTE — MR AVS SNAPSHOT
"              After Visit Summary   10/26/2018    Sejal Devlin    MRN: [id number]           Patient Information     Date Of Birth          1960        Visit Information        Provider Department      10/26/2018 1:00 PM Kimberly Saez MD [facility]        Today's Diagnoses     Pulmonary nodules    -  1    Dyspnea on exertion           Follow-ups after your visit        Follow-up notes from your care team     Return in about 3 months (around 1/26/2019).      Who to contact     If you have questions or need follow up information about today's clinic visit or your schedule please contact [facility] directly at [phone].  Normal or non-critical lab and imaging results will be communicated to you by MyChart, letter or phone within 4 business days after the clinic has received the results. If you do not hear from us within 7 days, please contact the clinic through [employer] or phone. If you have a critical or abnormal lab result, we will notify you by phone as soon as possible.  Submit refill requests through [employer] or call your pharmacy and they will forward the refill request to us. Please allow 3 business days for your refill to be completed.          Additional Information About Your Visit        MyChart Information     [employer] gives you secure access to your electronic health record. If you see a primary care provider, you can also send messages to your care team and make appointments. If you have questions, please call your primary care clinic.  If you do not have a primary care provider, please call [phone] and they will assist you.        Care EveryWhere ID     This is your Care EveryWhere ID. This could be used by other organizations to access your [facility] medical records  [id number]        Your Vitals Were     Pulse Respirations Height Last Period Pulse Oximetry BMI (Body Mass Index)    70 16 1.588 m (5' 2.52\") " 12/18/2004 100% 26.8 kg/m2       Blood Pressure from Last 3 Encounters:   10/26/18 (!) 168/95   10/22/18 133/86   10/11/18 (!) 159/95    Weight from Last 3 Encounters:   10/26/18 67.6 kg (149 lb)   10/22/18 67.6 kg (149 lb)   10/11/18 68.9 kg (151 lb 12.8 oz)               Primary Care Provider Office Phone # Fax #    Marita ENCISO JONATAN Freitas [phone] [address] [place] [address]        Equal Access to Services     [facility] JANELLE : Hadii thiago ibarra hadasho Socesiliaali, waaxda luqadaha, qaybta kaalmada adeegyada, carly veliz . So [facility] [phone].    ATENCIÓN: Si habla español, tiene a badillo disposición servicios gratuitos de asistencia lingüística. [facility] [phone].    We comply with applicable federal civil rights laws and [place] laws. We do not discriminate on the basis of race, color, national origin, age, disability, sex, sexual orientation, or gender identity.            Thank you!     Thank you for choosing [facility]  for your care. Our goal is always to provide you with excellent care. Hearing back from our patients is one way we can continue to improve our services. Please take a few minutes to complete the written survey that you may receive in the mail after your visit with us. Thank you!             Your Updated Medication List - Protect others around you: Learn how to safely use, store and throw away your medicines at www.disposemymeds.org.          This list is accurate as of 10/26/18 11:59 PM.  Always use your most recent med list.                   Brand Name Dispense Instructions for use Diagnosis    acetaminophen-codeine 300-30 MG per tablet    TYLENOL #3    10 tablet    Take 1 tablet by mouth every 8 hours as needed for pain maximum 2 tablet(s) per day    Cervicalgia       cyclobenzaprine 10 MG tablet    FLEXERIL    30 tablet    Take 0.5-1 tablets (5-10 mg) by mouth 3 times daily as needed for  muscle spasms    Cervicalgia       FAMOTIDINE PO      Take 20 mg by mouth daily        GNP GARLIC EXTRACT PO           omeprazole 40 MG capsule    priLOSEC    90 capsule    TAKE 1 CAPSULE (40 MG) BY MOUTH DAILY TAKE 30-60 MINUTES BEFORE A MEAL.    Gastroesophageal reflux disease, esophagitis presence not specified       VITAMIN D (CHOLECALCIFEROL) PO      Take by mouth daily

## 2018-10-26 NOTE — LETTER
"10/26/2018       RE: Sejal Devlin  [address] [place] [address]     Dear Colleague,    Thank you for referring your patient, Sejal Devlin, to the [facility] FOR LUNG SCIENCE AND HEALTH at [facility]. Please see a copy of my visit note below.    Pulmonary Clinic Initial Visit Note    CC: shortness of breath      HPI:     Ms. Sejal Devlin is a 57yo female with a history of Sjogren's syndrome dx'ed via lower lip biopsy in May of 2014 who presents to pulmonary clinic for evaluation of dyspnea on exertion.     Ms. Jeronimo reports at least a 1 year history of multiple \"episodes\" of feeling like she cannot get enough air.  This occurs about once every 1-2 months, for 1-2 weeks at a time.  During this time, she feels like she has to brace herself when sitting or standing to get enough air.  She reports that during her regular activities/hobbies like drumming, or playing in bell choir, she has to sit instead of stand.  This was previously not an issue for her.  This has worsened in frequency and intensity over the past several months.     The patient was seen by [place] [facility] in August of 2017 for the same issue; at that visit she was noted to have normal pulmonary function and FeNO.  Her cough and dyspnea were thought to be related to post infectious airway inflammation and upper airway cough syndrome.  Supportive management was prescribed and the patient denies significant improvement.     Regarding these episodes, she denies any triggers or therapies which have been effective. She has no associated wheezing, chest pain, lightheadedness, dizziness, or chest pressure which accompanies her symptoms.  She is very tearful regarding this episodes and has to cancel plans and alter her activities due to these episodes.  She notes that currently she is feeling well, and laments that she isn't in the midst of an episode.  Of note, she reports that she bought a pulse oximeter and " reports that her O2 saturation is normal during these episodes.   Of note, she finds activities like drumming the most difficult during the flare, she feels that when she walks things are a bit less severe.      She has about a 30 pack year smoking history, and quit in 2008.  She denies any other drug use.  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  She has a cat at home but is not allergic to it.  She has no down pillows, bird exposures, or other known exposures.  She denies recent travel.      She denies any recent fevers, sweats, or chills.  Regarding her Sjogren's, this was diagnosed via lip biopsy in May of 2014 after experiencing a salty taste in her mouth.  Scl 70, RNP, Smith, SSA, and SSB antibodies were negative at this time and se denies any other significant symptoms related to her Sjogren's.        PMH:  Past Medical History:   Diagnosis Date     Diverticulosis of colon 2008    as noted on colonoscopy     Family history of colon cancer 5/22/2012     Family history of colon cancer      Family history of colon cancer      Family history of colon cancer      Postmenopausal 2005       Allergies:  Allergies   Allergen Reactions     No Known Drug Allergies      Seasonal Allergies        Social History:  Social History     Social History     Marital status:      Spouse name: N/A     Number of children: 3     Years of education: N/A     Occupational History      [employer] secretary, and PCA      [employer]      Social History Main Topics     Smoking status: Former Smoker     Packs/day: 1.00     Years: 10.00     Types: Cigarettes     Start date: 7/1/2002     Quit date: 1/1/2008     Smokeless tobacco: Never Used      Comment: smoked age [age], quit x 1 yr and resumed then quit after one yr     Alcohol use Yes      Comment: one beer a day     Drug use: No     Sexual activity: Not Currently     Partners: Male     Birth control/  protection: Post-menopausal      Comment: Pt's  has a vasectomy     Other Topics Concern     Parent/Sibling W/ Cabg, Mi Or Angioplasty Before 65f 55m? Yes     Brother, age 55     Social History Narrative    Caffeine intake/servings daily - 2-3    Calcium intake/servings daily - 2    Exercise 7 times weekly - describe walking    Sunscreen used - No    Seatbelts used - Yes    Guns stored in the home - No    Self Breast Exam - No    Pap test up to date -  Yes, 2008    Eye exam up to date -  Yes, 2007    Dental exam up to date -  Yes, 2009    DEXA scan up to date -  No    Flex Sig/Colonoscopy up to date -  Yes, 2008    Mammography up to date -  Yes, 2009    Immunizations reviewed and up to date - Yes, td     Abuse: Current or Past (Physical, Sexual or Emotional) - No    Do you feel safe in your environment - Yes    Do you cope well with stress - Yes    Do you suffer from insomnia - No    2010                                           Medications:  Current Outpatient Prescriptions   Medication Sig Dispense Refill     acetaminophen-codeine (TYLENOL #3) 300-30 MG per tablet Take 1 tablet by mouth every 8 hours as needed for pain maximum 2 tablet(s) per day (Patient not taking: Reported on 2018) 10 tablet 0     cyclobenzaprine (FLEXERIL) 10 MG tablet Take 0.5-1 tablets (5-10 mg) by mouth 3 times daily as needed for muscle spasms (Patient not taking: Reported on 2018) 30 tablet 1     FAMOTIDINE PO Take 20 mg by mouth daily       GNP GARLIC EXTRACT PO        omeprazole (PRILOSEC) 40 MG capsule TAKE 1 CAPSULE (40 MG) BY MOUTH DAILY TAKE 30-60 MINUTES BEFORE A MEAL. (Patient not taking: Reported on 10/11/2018) 90 capsule 1     VITAMIN D, CHOLECALCIFEROL, PO Take by mouth daily         Family History:  Family History   Problem Relation Age of Onset     C.A.D. Mother      50s     Cancer - colorectal Mother       age 64, diagnosed age 54     Circulatory Mother      Coronary Artery  "Disease Mother      Colon Cancer Mother      C.A.D. Father      Eye Disorder Father      glaucoma     Lipids Father      Cancer Father      kidney     Hyperlipidemia Father      Substance Abuse Brother      Cardiovascular Brother      MI, stent at 52     Connective Tissue Disorder Other      cousin on father side has lupus     Coronary Artery Disease Brother      Coronary Artery Disease Brother      Substance Abuse Brother        ROS: Complete 10 point ROS negative unless mentioned in HPI    Physical Exam:  BP (!) 168/95 (BP Location: Right arm, Patient Position: Chair, Cuff Size: Adult Regular)  Pulse 70  Resp 16  Ht 1.588 m (5' 2.52\")  Wt 67.6 kg (149 lb)  LMP 12/18/2004  SpO2 100%  BMI 26.8 kg/m2    General: Sitting in the chair in NAD  HEENT: anicteric, normal oral mucosa, normal nasal turbinates  Neck: no palpable lymphadenopathy, no JVD noted.   Chest: CTAB, no wheezing  Cardiac: RRR no murmurs  Abdomen: Soft, flat, non tender, active BS  Extremities: No LE Edema, cyanosis, or clubbing  Neuro: A&Ox3, no focal defecits  Skin: no rash noted        Labs and Radiology:    Stress ECHO 7/6/2018:  Interpretation Summary  Exercise stress echocardiogram with no inducible ischemia.     Target heart rate achieved. Normal blood pressure response to exercise.  No angina symptoms with exercise.  No ECG evidence of ischemia.  Normal segmental and global LV function with EF of approximately 55-60% at  rest; with exercise left ventricular cavity size decreases and LVEF increases  to 65-70%.  No stress induced regional wall motion abnormalities.  Average functional capacity for age.  Mildly dilated aortic root (3.8 cm at the Sinuses of Valsalva) and no  significant valvular dysfunction noted on screening 2D and Doppler  examination.    CT Chest from July 2017 personally reviewed: LLL pulmonary nodule noted.  No obvious lung parenchymal abormalities.     CXR today reviewed- no obvious abnormalities   PFT's:    PFTs and " diffusing capacity are within normal limits    Assessment and Plan:  Sejal Devlin is a 58 year old female with a history of Sjogren's who presents with a progressive history of prolonged episodes of feeling as if she's not getting enough air.  Unlikely a cardiac issue given her normal stress ECHO in July, however, we don't have a resting ECHO to assess her RV.  Her lung parenchyma was quite normal on her CT in 2017, and her PFTs remain completely normal as well, which argue against an organic pulmonary issue.  However, she does have Sjogren's which raises the possibility of an indolent ILD which may not have manifested on her CT last year.   The patient acknowledges that she's open to considering anxiety or psychological etiologies of these episodes, but feels strongly that there is something wrong.  She is tearful today as we discuss her normal PFT and imaging results, and unfortunately I don't have a clear diagnosis for her at this point- though I do not think this is a life threatening illness.      1. Dyspneic episodes without any other associated symptoms: etiology unclear at this point.  Due to possibility of Sjogren's related ILD, will obtain further eval:   - CT chest  - resting TTE to assess RV (anticipate this will be wnl given normal DLCO on PFTS)  - 6MWT on return  - pt declines autoimmune studies at this time due to cost  - pt will call PCP when she is in the midst of a flare for evaluation during this time to r/o any wheezing or other abnormalities    2. LLL pulmonary nodule: 4mm identified last year.  +30 pack year smoking history, quit 10 years ago.   - repeat CT chest    Flu shot completed    RTC in 3 months     Seen and staffed with Dr. Shane Saez MD  Pulmonary and Critical Care Fellow    Answers for HPI/ROS submitted by the patient on 10/19/2018   General Symptoms: No  Skin Symptoms: No  HENT Symptoms: No  EYE SYMPTOMS: No  HEART SYMPTOMS: No  LUNG SYMPTOMS: Yes  INTESTINAL  SYMPTOMS: Yes  URINARY SYMPTOMS: No  GYNECOLOGIC SYMPTOMS: No  BREAST SYMPTOMS: No  SKELETAL SYMPTOMS: No  BLOOD SYMPTOMS: No  NERVOUS SYSTEM SYMPTOMS: No  MENTAL HEALTH SYMPTOMS: No  Cough: Yes  Sputum or phlegm: Yes  Coughing up blood: No  Difficulty breating or shortness of breath: Yes  Snoring: Yes  Wheezing: Yes  Difficulty breathing on exertion: Yes  Nighttime Cough: No  Difficulty breathing when lying flat: No  Heart burn or indigestion: No  Nausea: No  Vomiting: No  Abdominal pain: No  Bloating: No  Constipation: No  Diarrhea: Yes  Blood in stool: No  Black stools: No  Rectal or Anal pain: No  Fecal incontinence: Yes  Yellowing of skin or eyes: No  Vomit with blood: No  Change in stools: Yes      Pulmonary Attending Note:    I saw, examined the patient and reviewed the chart.  I agree with the fellow's note which we formulated together and represent our mutual findings, assessment and plan. Briefly, the patient is seen for dyspnea. Has sjogren's. Will do CT chest, TTE and 6min walk test prior to next clinic visit.    CALVIN Lynn MD

## 2018-10-26 NOTE — NURSING NOTE
Chief Complaint   Patient presents with     Consult     Shortness of breathe      Yohana Branham CMA

## 2018-11-01 LAB
DLCOUNC-%PRED-PRE: 97 %
DLCOUNC-PRE: 20.7 ML/MIN/MMHG
DLCOUNC-PRED: 21.3 ML/MIN/MMHG
ERV-%PRED-PRE: 74 %
ERV-PRE: 0.55 L
ERV-PRED: 0.74 L
EXPTIME-PRE: 6.74 SEC
FEF2575-%PRED-PRE: 93 %
FEF2575-PRE: 2.1 L/SEC
FEF2575-PRED: 2.25 L/SEC
FEFMAX-%PRED-PRE: 126 %
FEFMAX-PRE: 7.81 L/SEC
FEFMAX-PRED: 6.18 L/SEC
FEV1-%PRED-PRE: 94 %
FEV1-PRE: 2.3 L
FEV1FEV6-PRE: 79 %
FEV1FEV6-PRED: 81 %
FEV1FVC-PRE: 79 %
FEV1FVC-PRED: 78 %
FEV1SVC-PRE: 79 %
FEV1SVC-PRED: 77 %
FIFMAX-PRE: 5.47 L/SEC
FRCPLETH-%PRED-PRE: 85 %
FRCPLETH-PRE: 2.26 L
FRCPLETH-PRED: 2.64 L
FVC-%PRED-PRE: 94 %
FVC-PRE: 2.9 L
FVC-PRED: 3.08 L
IC-%PRED-PRE: 97 %
IC-PRE: 2.35 L
IC-PRED: 2.42 L
RVPLETH-%PRED-PRE: 93 %
RVPLETH-PRE: 1.71 L
RVPLETH-PRED: 1.84 L
TLCPLETH-%PRED-PRE: 96 %
TLCPLETH-PRE: 4.62 L
TLCPLETH-PRED: 4.77 L
VA-%PRED-PRE: 87 %
VA-PRE: 4.29 L
VC-%PRED-PRE: 91 %
VC-PRE: 2.9 L
VC-PRED: 3.16 L

## 2018-11-15 DIAGNOSIS — Z13.220 LIPID SCREENING: ICD-10-CM

## 2018-11-15 LAB
CHOLEST SERPL-MCNC: 232 MG/DL
GLUCOSE SERPL-MCNC: 93 MG/DL (ref 70–99)
HDLC SERPL-MCNC: 54 MG/DL
LDLC SERPL CALC-MCNC: 157 MG/DL
NONHDLC SERPL-MCNC: 178 MG/DL
TRIGL SERPL-MCNC: 106 MG/DL

## 2018-11-15 PROCEDURE — 80061 LIPID PANEL: CPT | Performed by: FAMILY MEDICINE

## 2018-11-15 PROCEDURE — 36415 COLL VENOUS BLD VENIPUNCTURE: CPT | Performed by: FAMILY MEDICINE

## 2018-11-15 PROCEDURE — 82947 ASSAY GLUCOSE BLOOD QUANT: CPT | Performed by: FAMILY MEDICINE

## 2018-12-20 ENCOUNTER — VIRTUAL VISIT (OUTPATIENT)
Dept: FAMILY MEDICINE | Facility: OTHER | Age: 58
End: 2018-12-20

## 2018-12-21 NOTE — PROGRESS NOTES
"Date:   Clinician: Minda Bahena  Clinician NPI: [id number]  Patient: Sejal Devlin  Patient : 1960  Patient Address: [address] [place] [address]  Patient Phone: [phone]  Visit Protocol: UTI  Patient Summary:  Sejal is a 58 year old ( : 1960 ) female who initiated a Visit for a presumed bladder infection. When asked the question \"Please sign me up to receive news, health information and promotions from [employer].\", Sejal responded \"No\".   Her symptoms started 1-3 days ago and consist of urinary frequency, feeling as if the bladder is never empty, urgency, and dysuria.   Symptom details   Urine color: The color of her urine is yellow.    Denied symptoms include urinary incontinence, vaginal discharge, abdominal pain, vomiting, flank pain, chills, vaginal itching, foul-smelling urine, and nausea. She does not feel feverish.   Sejal has not used any over-the-counter medications or home remedies to relieve her current symptoms.  Precipitating events  Sejal denies having a sexually transmitted disease.  Pertinent medical history  Sejal has had a bladder infection before and has had 1 in the past 12 months. Her most recent bladder infection was not within the last 4 weeks. Her current symptoms are similar to her previous bladder infection symptoms.   She is not sure what antibiotics have been effective in treating her past bladder infections.   Sejal does not get yeast infections when she takes antibiotics and has not been prescribed antibiotics to prevent frequent or repeated bladder infections in the past. She has not experienced problems or side effects with any of the common antibiotics used to treat bladder infections.   Sejal does not have a history of kidney stones. She has not used a catheter or been a patient in a hospital or nursing home in the past 2 weeks.   Sejal does not smoke or use smokeless tobacco.   MEDICATIONS: No current medications, ALLERGIES: " NKDA  Clinician Response:  Dear Sejal,  Based on the information you have provided, you likely have an acute urinary tract infection, also called a bladder infection. Bladder infections occur when bacteria from the outside of the body enters the urinary tract. Any part of the urinary system can be infected, but the bladder is the most common.  Medication information  I am prescribing:     Nitrofurantoin monohyd/m-cryst (Macrobid) 100 mg oral capsule. Take 1 capsule by mouth every 12 hours for 5 days. Take this medication with food. There are no refills with this prescription.   The medication I prescribed for your bladder infection is an antibiotic. Continue taking the medication until it is gone even if you feel better.   Yeast infections can be a common side effect of antibiotics. The most common symptom of a yeast infection is itchiness in and around the vagina. Other signs and symptoms include burning, redness, or a thick, white vaginal discharge that looks like cottage cheese and does not have a bad smell.  Unless you are allergic to the following over-the-counter medication, I recommend:     Phenazopyridine (AZO, Uristat, or store brand) oral tablet to treat your discomfort with urination. Swallow 2 tablets 3 times a day for up to 2 days. Take the tablets with a full glass of water after a meal.   This medication helps to relieve symptoms of a bladder infection such as pain, burning, and the sudden urge to urinate, but will not cure the infection. The color of your urine will likely turn an orange color and can permanently stain clothing it comes into contact with.  Soft contact lenses can also be permanently stained and should not be worn while taking this medication. If you must wear your contacts, wash your hands after handling the medication.  Stop using this medication immediately and be seen in a clinic or urgent care if your skin or the whites of your eyes appear yellowish in color.  Over-the-counter  medications do not require a prescription. Ask the pharmacist if you have any questions.  Self care  Urination helps to flush bacteria from the urinary tract. For this reason, drinking water and urinating often helps relieve some symptoms of a bladder infection and can decrease your risk of getting bladder infections in the future.  Other steps you can take to prevent future bladder infections include:     Wipe front to back after using the bathroom    Urinate after sexual intercourse    Avoid using deodorant sprays, douches, or powders in the vaginal area     When to seek care  Please make an appointment to be seen in a clinic or urgent care if any of the following occur:     You develop new symptoms or your symptoms become worse    You have medication side effects that make it difficult to take them as prescribed    Your symptoms do not improve within 1-2 days of starting treatment    You have symptoms of a bladder infection that return shortly after completing treatment     It is possible to have an allergic reaction to an antibiotic even if you have not had one in the past. If you notice a new rash, significant swelling, or difficulty breathing, stop taking this medication immediately and go to a clinic or urgent care.   Diagnosis: Acute uncomplicated bladder infection  Diagnosis ICD: N39.0  Prescription: nitrofurantoin monohyd/m-cryst (Macrobid) 100 mg oral capsule 10 capsule, 5 days supply. Take 1 capsule by mouth every 12 hours for 5 days. Refills: 0, Refill as needed: no, Allow substitutions: yes  Pharmacy: [id number] - [phone] - [address], [place] [address]

## 2019-01-14 ENCOUNTER — TELEPHONE (OUTPATIENT)
Dept: GASTROENTEROLOGY | Facility: CLINIC | Age: 59
End: 2019-01-14

## 2019-01-14 DIAGNOSIS — Z12.11 SPECIAL SCREENING FOR MALIGNANT NEOPLASMS, COLON: Primary | ICD-10-CM

## 2019-01-14 NOTE — TELEPHONE ENCOUNTER
Patient scheduled for colonoscopy    Indication for procedure. screening    Referring Provider. Jackie Joshua    ? No     Arrival time verified? 7 am     Facility location verified? [address]; [address] floor    Instructions given regarding prep and procedure    Prep Type golytely e script sent to [facility]     Are you taking any anticoagulants or blood thinners? no    Instructions given? Yes written    Electronic implanted devices? no    Pre procedure teaching completed? Yes    Transportation from procedure? yes    H&P / Pre op physical completed? n/a

## 2019-01-22 ENCOUNTER — HOSPITAL ENCOUNTER (OUTPATIENT)
Facility: AMBULATORY SURGERY CENTER | Age: 59
End: 2019-01-22
Attending: INTERNAL MEDICINE
Payer: COMMERCIAL

## 2019-01-22 VITALS
WEIGHT: 150 LBS | OXYGEN SATURATION: 100 % | BODY MASS INDEX: 26.58 KG/M2 | RESPIRATION RATE: 14 BRPM | DIASTOLIC BLOOD PRESSURE: 85 MMHG | TEMPERATURE: 97.8 F | SYSTOLIC BLOOD PRESSURE: 132 MMHG | HEART RATE: 73 BPM | HEIGHT: 63 IN

## 2019-01-22 RX ORDER — LIDOCAINE 40 MG/G
CREAM TOPICAL
Status: DISCONTINUED | OUTPATIENT
Start: 2019-01-22 | End: 2019-01-22 | Stop reason: HOSPADM

## 2019-01-22 RX ORDER — ONDANSETRON 2 MG/ML
4 INJECTION INTRAMUSCULAR; INTRAVENOUS
Status: DISCONTINUED | OUTPATIENT
Start: 2019-01-22 | End: 2019-01-22 | Stop reason: HOSPADM

## 2019-01-22 RX ORDER — ONDANSETRON 4 MG/1
4 TABLET, ORALLY DISINTEGRATING ORAL EVERY 6 HOURS PRN
Status: DISCONTINUED | OUTPATIENT
Start: 2019-01-22 | End: 2019-01-23 | Stop reason: HOSPADM

## 2019-01-22 RX ORDER — SIMETHICONE
LIQUID (ML) MISCELLANEOUS PRN
Status: DISCONTINUED | OUTPATIENT
Start: 2019-01-22 | End: 2019-01-22 | Stop reason: HOSPADM

## 2019-01-22 RX ORDER — FENTANYL CITRATE 50 UG/ML
INJECTION, SOLUTION INTRAMUSCULAR; INTRAVENOUS PRN
Status: DISCONTINUED | OUTPATIENT
Start: 2019-01-22 | End: 2019-01-22 | Stop reason: HOSPADM

## 2019-01-22 RX ORDER — FLUMAZENIL 0.1 MG/ML
0.2 INJECTION, SOLUTION INTRAVENOUS
Status: ACTIVE | OUTPATIENT
Start: 2019-01-22 | End: 2019-01-22

## 2019-01-22 RX ORDER — ONDANSETRON 2 MG/ML
4 INJECTION INTRAMUSCULAR; INTRAVENOUS EVERY 6 HOURS PRN
Status: DISCONTINUED | OUTPATIENT
Start: 2019-01-22 | End: 2019-01-23 | Stop reason: HOSPADM

## 2019-01-22 RX ORDER — NALOXONE HYDROCHLORIDE 0.4 MG/ML
.1-.4 INJECTION, SOLUTION INTRAMUSCULAR; INTRAVENOUS; SUBCUTANEOUS
Status: DISCONTINUED | OUTPATIENT
Start: 2019-01-22 | End: 2019-01-23 | Stop reason: HOSPADM

## 2019-01-22 ASSESSMENT — MIFFLIN-ST. JEOR: SCORE: 1224.53

## 2019-01-23 LAB — COPATH REPORT: NORMAL

## 2019-01-24 LAB — COLONOSCOPY: NORMAL

## 2019-01-30 PROBLEM — K63.5 HYPERPLASTIC COLON POLYP: Status: ACTIVE | Noted: 2019-01-30

## 2019-04-25 ENCOUNTER — OFFICE VISIT (OUTPATIENT)
Dept: FAMILY MEDICINE | Facility: CLINIC | Age: 59
End: 2019-04-25
Payer: COMMERCIAL

## 2019-04-25 VITALS
TEMPERATURE: 97.6 F | SYSTOLIC BLOOD PRESSURE: 142 MMHG | HEART RATE: 84 BPM | RESPIRATION RATE: 18 BRPM | WEIGHT: 155 LBS | DIASTOLIC BLOOD PRESSURE: 84 MMHG | OXYGEN SATURATION: 97 % | BODY MASS INDEX: 27.46 KG/M2

## 2019-04-25 DIAGNOSIS — Z12.31 VISIT FOR SCREENING MAMMOGRAM: ICD-10-CM

## 2019-04-25 DIAGNOSIS — M79.12 STERNOCLEIDOMASTOID MUSCLE TENDERNESS: Primary | ICD-10-CM

## 2019-04-25 PROCEDURE — 99213 OFFICE O/P EST LOW 20 MIN: CPT | Performed by: FAMILY MEDICINE

## 2019-04-25 RX ORDER — CYCLOBENZAPRINE HCL 10 MG
10 TABLET ORAL DAILY PRN
Qty: 20 TABLET | Refills: 0 | Status: SHIPPED | OUTPATIENT
Start: 2019-04-25 | End: 2019-07-05

## 2019-04-25 NOTE — PROGRESS NOTES
SUBJECTIVE:   Sejal Devlin is a 58 year old female who presents to clinic today for the following   health issues:      Neck Pain      Duration: Friday last week    Description:  Location: Right upper near hair neck  Radiation: into the right neck    Intensity:  Spasms are severe     History (similar episodes/previous evaluation): similar injury about a year ago, might be work related. Leaning into the computer    Precipitating or alleviating factors: None    Therapies tried and outcome: extra strength exedrin, ibuprofen, heat and ice    chiropractor Monday didn't help     Had a similar injury about a year ago. It is at the right base of her neck.    Injury just came on. Doesn't recall a particular event. Wonders about posture at work. She works at a computer.    It is a somewhat more recent recurrence.    She tried chiropractic work. Notes laying on the floor with a towel on the neck helps.    EXAM:  /84   Pulse 84   Temp 97.6  F (36.4  C) (Oral)   Resp 18   Wt 70.3 kg (155 lb)   LMP 12/18/2004   SpO2 97%   BMI 27.46 kg/m    Constitutional: Healthy, alert, no distress   EENT: PERRL, TM's clear bilaterally, oropharynx without erythema, no anterior cervical lymphadenopathy   Cardiovascular: RRR. No murmurs   Respiratory: Clear to auscultation   Musculoskeletal: Slight discomfort of right SCM with stretch, good neck ROM    ASSESSMENT    ICD-10-CM    1. Sternocleidomastoid muscle tenderness M79.12 [facility] PT, HAND, AND CHIROPRACTIC REFERRAL     cyclobenzaprine (FLEXERIL) 10 MG tablet   2. Visit for screening mammogram Z12.31 *MA Screening Digital Bilateral      Plan:  Patient Instructions   It is important to be attentive to your BP. Perhaps rechecking it monthly for 3 months.  Ok to use ibuprofen 600mg up to twice daily as needed for neck spasm.  Consider PT for stretches.  OK to use flexeril.  Warm compress may also help.       Return in about 1 month (around 5/25/2019), or if symptoms worsen or fail to  improve.    Raghav Ward MD  Family Medicine Physician

## 2019-04-25 NOTE — PATIENT INSTRUCTIONS
It is important to be attentive to your BP. Perhaps rechecking it monthly for 3 months.  Ok to use ibuprofen 600mg up to twice daily as needed for neck spasm.  Consider PT for stretches.  OK to use flexeril.  Warm compress may also help.

## 2019-06-20 NOTE — PROGRESS NOTES
"Subjective     Sejal Devlin is a 58 year old female who presents to clinic today for the following health issues:  Chief Complaint   Patient presents with     Shortness of Breath     years comes and goes     Hypertension     would like med for high BP     Medication Request     anxiety med       History of SOB.  Work up done by cardiology.  Stress test was done, \"I was able to complete the test.\"   But significant SOB by the time she was done.    Pulmonology:  Work up done, no emphysema, SOB.  She does not plan to go back to pulmonology.     SOB flares:  \"they are terrible when they hit.\"  SOB episode will last 2 weeks. Will happen every 2-3 months.  Currently she is in a flare.  She will be a \"10\" in misery when this is flaring.  She will be SOB with rest, activity, etc.  Today she is \"miserable.\"  She has to stop and rest \"every 10 feet.\"  No wheezing.  No fever, URI symptoms.      Lung nodule:  Has had chest CT.  Is considered \"stable.\"  This is Considered a common type of nodule and she does not plan to go back to pulmonology.       Anxiety:  \"I'm open to taking anxiety medications to help alleviate the anxiety that I have when I don't feel like I can breathe.\"  She is requesting a medication to help her with her anxiety during her Episodic flare ups.  No history of anxiety or depression.       Patient Active Problem List   Diagnosis     Osteopenia     Hyperlipidemia LDL goal <160     Family history of colon cancer     Rash     Circulation problem     Dry mouth     Vitamin D deficiency     Sjogren's syndrome (H)     Leo esophagus     Hyperplastic colon polyp     Past Surgical History:   Procedure Laterality Date     APPENDECTOMY  1977     COLONOSCOPY  2013     COLONOSCOPY Left 1/22/2019    Procedure: COMBINED COLONOSCOPY, SINGLE OR MULTIPLE BIOPSY/POLYPECTOMY BY BIOPSY;  Surgeon: Olesya Phipps MD;  Location: [facility]     ESOPHAGOSCOPY, GASTROSCOPY, DUODENOSCOPY (EGD), COMBINED N/A 4/11/2018    " Procedure: COMBINED ESOPHAGOSCOPY, GASTROSCOPY, DUODENOSCOPY (EGD);  egd;  Surgeon: Poonam Alatorre MD;  Location: [facility]     ESOPHAGOSCOPY, GASTROSCOPY, DUODENOSCOPY (EGD), COMBINED N/A 2018    Procedure: COMBINED ESOPHAGOSCOPY, GASTROSCOPY, DUODENOSCOPY (EGD), BIOPSY SINGLE OR MULTIPLE;;  Surgeon: Poonam Alatorre MD;  Location: [facility] OR     GYN SURGERY           HC REMOVAL GALLBLADDER      Cholecystectomy     ORTHOPEDIC SURGERY      Broken right radius     SURGICAL HISTORY OF -       c- section       Social History     Tobacco Use     Smoking status: Former Smoker     Packs/day: 1.00     Years: 10.00     Pack years: 10.00     Types: Cigarettes     Start date: 2002     Last attempt to quit: 2008     Years since quittin.4     Smokeless tobacco: Never Used     Tobacco comment: smoked age [age], quit x 1 yr and resumed then quit after one yr   Substance Use Topics     Alcohol use: Yes     Comment: 1-2 per day     Family History   Problem Relation Age of Onset     C.A.D. Mother         50s     Cancer - colorectal Mother          age 64, diagnosed age 54     Circulatory Mother      Coronary Artery Disease Mother      Colon Cancer Mother      Hypertension Mother      C.A.D. Father      Eye Disorder Father         glaucoma     Lipids Father      Cancer Father         kidney     Hyperlipidemia Father      Substance Abuse Brother      Cardiovascular Brother         MI, stent at 52     Connective Tissue Disorder Other         cousin on father side has lupus     Coronary Artery Disease Brother      Coronary Artery Disease Brother      Substance Abuse Brother          Current Outpatient Medications   Medication Sig Dispense Refill     GNP GARLIC EXTRACT PO        VITAMIN D, CHOLECALCIFEROL, PO Take by mouth daily       cyclobenzaprine (FLEXERIL) 10 MG tablet Take 1 tablet (10 mg) by mouth daily as needed for muscle spasms (Patient not taking: Reported on 2019) 20 tablet 0  "    Allergies   Allergen Reactions     No Known Drug Allergies      Seasonal Allergies      Recent Labs   Lab Test 11/15/18  0810 10/17/18  1123 10/12/17  1530 09/26/16  1627 08/29/14  1706 03/05/14  1619 10/21/13  1428 06/27/13  1451  05/22/12  0839   A1C  --   --   --   --   --  5.7  --   --   --   --    *  --   --  115*  --   --   --   --   --  147*   HDL 54  --   --  47*  --   --   --   --   --  49*   TRIG 106  --   --  237*  --   --   --   --   --  151*   ALT  --  20 23  --  22 25 29 23   < >  --    CR  --  0.82 0.85  --  0.77 0.85 0.73 0.74   < >  --    GFRESTIMATED  --  72 69  --  77 70 83 82   < >  --    GFRESTBLACK  --  87 83  --  >90   GFR Calc   84 >90 >90   < >  --    POTASSIUM  --   --   --   --   --  4.0 5.3 4.6   < >  --    TSH  --   --   --   --   --   --  1.24 0.85  --   --     < > = values in this interval not displayed.      BP Readings from Last 3 Encounters:   06/21/19 128/85   04/25/19 142/84   01/22/19 132/85    Wt Readings from Last 3 Encounters:   06/21/19 70.3 kg (155 lb)   04/25/19 70.3 kg (155 lb)   01/22/19 68 kg (150 lb)           Reviewed and updated as needed this visit by Provider         Review of Systems   ROS COMP: Constitutional, HEENT, cardiovascular, pulmonary, GI, , musculoskeletal, neuro, skin, endocrine and psych systems are negative, except as otherwise noted.      Objective    /85 (BP Location: Right arm, Patient Position: Sitting, Cuff Size: Adult Regular)   Pulse 69   Temp 97.1  F (36.2  C) (Oral)   Resp 16   Ht 1.6 m (5' 3\")   Wt 70.3 kg (155 lb)   LMP 12/18/2004   SpO2 94%   BMI 27.46 kg/m    Body mass index is 27.46 kg/m .  Physical Exam   GENERAL: healthy, alert. Worried.   EYES: Eyes grossly normal to inspection, PERRL and conjunctivae and sclerae normal  HENT: ear canals and TM's normal, nose and mouth without ulcers or lesions  NECK: no adenopathy and thyroid normal to palpation  RESP: Respirations even regular.  No " respiratory distress.  No intercostal retractions noted.  Lungs clear to auscultation - no rales, rhonchi or wheezes.  With each deep breath for her lung assessment, she leans forward during inhalation and then leans back during exhalation.  She stated that taking these deep breaths was uncomfortable for her.  CV: regular rate and rhythm, normal S1 S2, no S3 or S4, no murmur, click or rub, no peripheral edema and peripheral pulses strong  MS: no gross musculoskeletal defects noted, no edema. Ambulatory with a steady gait.   SKIN: warm and dry and her color is good.  Capillary nailbeds are pink with brisk capillary refill.   NEURO: Normal strength and tone, mentation intact and speech normal  PSYCH: mentation appears bright. She answers questions appropriately.           Assessment & Plan     (R06.02) SOB (shortness of breath)  (primary encounter diagnosis)  Comment: Uncertain  Plan: CARDIOLOGY EVAL ADULT REFERRAL, ECHO Congenital        Transthoracic (TTE)        With the patient, together we went through a few of her consultation notes that are in her chart from the pulmonologist and Dr. Joshua.  Face-to-face with the patient today, I told her I was not sure what our next best step would be and I would like to review her situation with Dr. Joshua who is in the clinic today.  For now, Fili is to take care of herself with healthy diet, fluids, rest.  I did my best to reassure her that her assessment today was normal with the exception of her sensation of shortness of breath.      (M35.00) Sjogren's syndrome, with unspecified organ involvement (H)  Comment: History of  Plan: Per the patient, her Sjogren's work-up was negative and she has been negative on any symptoms for a while.  She does not intend to go back to the specialist.    (F41.8) Situational anxiety  Comment:   Plan: hydrOXYzine (VISTARIL) 25 MG capsule        See below    (Z13.820) Screening for osteoporosis  Comment: Routine  Plan: DEXA  "HIP/PELVIS/SPINE - Future        Due    (Z12.31) Screening for breast cancer  Comment: Routine  Plan: MA SCREENING DIGITAL BILAT - Future  (s+30)        Due       BMI:   Estimated body mass index is 27.46 kg/m  as calculated from the following:    Height as of this encounter: 1.6 m (5' 3\").    Weight as of this encounter: 70.3 kg (155 lb).     Return in about 2 weeks (around 7/5/2019).    After the patient left the clinic, I was able to have a discussion with Dr. Jackie Joshua about this patient, her previous clinic appointment, we reviewed her pulmonology consultation notes etc.  Per Dr. Joshua, we did find that the pulmonologist recommended that this patient go forward with a resting BISHOP and a follow-up appointment with pulmonology.   We also discussed that a low-dose antihypertensive would not be a bad idea, but it would be best if she has a first to then help us decide what would be her best antihypertensive.    I then placed a telephone call to Lexie to tell about the above discussion.  Urgently on.  She will go forward with the BISHOP and I did place that order.  I did tell her that in reviewing the pulmonologist note, it is shown the pulmonologist did want to see her back in the clinic for follow-up.  Per Sejal, \" I thought we were done.\"  During our telephone conversation, Sejal did ask again for medication for situational anxiety.  She states she tried to go out for lunch today, but felt so significant that she really did not enjoy herself and had to go home.  I considered an SSRI benzo, but I decided to give her hydroxyzine.  I did tell her about potential side effects, the sedating side effects, do not drive and take this medication etc.  I also instructed her to read the side effect profile that comes with the medication.  She was appreciative.  She states she will go ahead and schedule her BISHOP as well as scheduled with pulmonology.  She is appreciative of the call.  With her clinic appointment " I told her about going to the ER if she becomes significantly worse and she agrees, she states she will do that if needed.      JONATAN Peraza [facility]

## 2019-06-21 ENCOUNTER — OFFICE VISIT (OUTPATIENT)
Dept: FAMILY MEDICINE | Facility: CLINIC | Age: 59
End: 2019-06-21
Payer: COMMERCIAL

## 2019-06-21 VITALS
DIASTOLIC BLOOD PRESSURE: 85 MMHG | BODY MASS INDEX: 27.46 KG/M2 | HEIGHT: 63 IN | RESPIRATION RATE: 16 BRPM | SYSTOLIC BLOOD PRESSURE: 128 MMHG | HEART RATE: 69 BPM | OXYGEN SATURATION: 94 % | TEMPERATURE: 97.1 F | WEIGHT: 155 LBS

## 2019-06-21 DIAGNOSIS — Z13.820 SCREENING FOR OSTEOPOROSIS: ICD-10-CM

## 2019-06-21 DIAGNOSIS — Z12.39 SCREENING FOR BREAST CANCER: ICD-10-CM

## 2019-06-21 DIAGNOSIS — R06.02 SOB (SHORTNESS OF BREATH): Primary | ICD-10-CM

## 2019-06-21 DIAGNOSIS — M35.00 SJOGREN'S SYNDROME, WITH UNSPECIFIED ORGAN INVOLVEMENT (H): ICD-10-CM

## 2019-06-21 DIAGNOSIS — F41.8 SITUATIONAL ANXIETY: ICD-10-CM

## 2019-06-21 PROCEDURE — 99214 OFFICE O/P EST MOD 30 MIN: CPT | Performed by: NURSE PRACTITIONER

## 2019-06-21 RX ORDER — HYDROXYZINE PAMOATE 25 MG/1
25 CAPSULE ORAL 4 TIMES DAILY PRN
Qty: 30 CAPSULE | Refills: 1 | Status: SHIPPED | OUTPATIENT
Start: 2019-06-21 | End: 2019-07-05

## 2019-06-21 ASSESSMENT — MIFFLIN-ST. JEOR: SCORE: 1247.21

## 2019-06-24 ENCOUNTER — MYC MEDICAL ADVICE (OUTPATIENT)
Dept: FAMILY MEDICINE | Facility: CLINIC | Age: 59
End: 2019-06-24

## 2019-06-24 DIAGNOSIS — R06.02 SHORTNESS OF BREATH: Primary | ICD-10-CM

## 2019-06-24 NOTE — TELEPHONE ENCOUNTER
There are too many messages/threads going on here and I am a little confused. I am going off of Dr. Saez's note to do a TTE, pulmomonology. Sejal never saw a cardiologist for consultation. Only pulmonology and Dr. Joshua.         From 10/26/2018 pulmonology note from Dr. Saez:    Pulmonary Attending Note:     I saw, examined the patient and reviewed the chart.  I agree with the fellow's note which we formulated together and represent our mutual findings, assessment and plan. Briefly, the patient is seen for dyspnea. Has sjogren's. Will do CT chest, TTE and 6min walk test prior to next clinic visit.

## 2019-06-24 NOTE — TELEPHONE ENCOUNTER
"Marita-Please review and advise.    TTE ordered but writer sees BISHOP noted in end of 6/21/19 office visit documentation.    BISHOP pended.    Per other [employer] messages sent by patient today:  \"Ok, I guess I have answered my own question.  (See previous message sent around 8:30am today, June 24)  People with PAH can have shortness of breath with normal oxygen saturation, and the TTE test can diagnose this. If they think it may be PAH, then this would be an appropriate test.  Will schedule asap.  Should not have second-guessed this.\"    \"Sorry, need one clarification:  do I need a BISHOP test, or a TTE test?   Marita wrote TTE, but the original cardiologist wrote BISHOP, so perhaps it is a simple typo?   Please advise asap.\"       Thank you!  INGRID OrellanaN, RN    "

## 2019-06-24 NOTE — TELEPHONE ENCOUNTER
Reason for Call:  Other call back    Detailed comments: Patient needs to know if she needs a BISHOP or TTE test.     Phone Number Patient can be reached at: Home number on file [phone] (home)    Best Time: Any     Can we leave a detailed message on this number? YES    Call taken on 6/24/2019 at 1:39 PM by Raimundo Potter

## 2019-06-25 ENCOUNTER — TELEPHONE (OUTPATIENT)
Dept: FAMILY MEDICINE | Facility: CLINIC | Age: 59
End: 2019-06-25

## 2019-06-25 NOTE — TELEPHONE ENCOUNTER
"Patient informed the TTE.  \"There are too many messages/threads going on here and I am a little confused. I am going off of Dr. Saez's note to do a TTE, pulmomonology. Sejal never saw a cardiologist for consultation. Only pulmonology and Dr. Joshua.            From 10/26/2018 pulmonology note from Dr. Saez:     Pulmonary Attending Note:     I saw, examined the patient and reviewed the chart.  I agree with the fellow's note which we formulated together and represent our mutual findings, assessment and plan. Briefly, the patient is seen for dyspnea. Has sjogren's. Will do CT chest, TTE and 6min walk test prior to next clinic visit.\"    "

## 2019-06-25 NOTE — TELEPHONE ENCOUNTER
Reason for Call:  Other call back    Detailed comments:  Pt states not sure if she needs a TTE test or BISHOP test , requested to refer to her my chart message. Please advise.     Phone Number Patient can be reached at: Home number on file [phone] (home)    Best Time: asap    Can we leave a detailed message on this number? YES    Call taken on 6/25/2019 at 12:06 PM by Loretta Licea

## 2019-06-27 ENCOUNTER — ANCILLARY PROCEDURE (OUTPATIENT)
Dept: CARDIOLOGY | Facility: CLINIC | Age: 59
End: 2019-06-27
Attending: NURSE PRACTITIONER
Payer: COMMERCIAL

## 2019-06-27 DIAGNOSIS — R06.02 SOB (SHORTNESS OF BREATH): ICD-10-CM

## 2019-07-01 ENCOUNTER — MYC MEDICAL ADVICE (OUTPATIENT)
Dept: FAMILY MEDICINE | Facility: CLINIC | Age: 59
End: 2019-07-01

## 2019-07-01 NOTE — TELEPHONE ENCOUNTER
DOD please review this message for Marita BLAKE .    We could have her do an E visit if needed.  Mariam Higgins RN

## 2019-07-02 NOTE — TELEPHONE ENCOUNTER
RECORDS RECEIVED FROM: Internal   DATE RECEIVED: 7-11   NOTES STATUS DETAILS   OFFICE NOTE from referring provider    Internal    OFFICE NOTE from other cardiologist    N/A    DISCHARGE SUMMARY from hospital    N/A    DISCHARGE REPORT from the ER   N/A    OPERATIVE REPORT    N/A    MEDICATION LIST   Internal    LABS     BMP   N/A    CBC   Internal 10-17-18   CMP   N/A    Lipids   Internal 11-15-18   TSH   N/A    DIAGNOSTIC PROCEDURES     EKG   Internal    Monitor Reports   N/A    IMAGING (DISC & REPORT)      Echo   Internal    Stress Tests   Internal    Cath   N/A    MRI/MRA   N/A    CT/CTA   N/A

## 2019-07-02 NOTE — TELEPHONE ENCOUNTER
JADE Freitas review her my chart med concerns.      Patient declined a clinic visit tomorrow and will wait until next week for PCP return to address her med questions.  Mariam Higgins RN

## 2019-07-05 ENCOUNTER — OFFICE VISIT (OUTPATIENT)
Dept: FAMILY MEDICINE | Facility: CLINIC | Age: 59
End: 2019-07-05
Payer: COMMERCIAL

## 2019-07-05 VITALS
OXYGEN SATURATION: 98 % | TEMPERATURE: 99 F | DIASTOLIC BLOOD PRESSURE: 87 MMHG | WEIGHT: 152 LBS | HEART RATE: 79 BPM | SYSTOLIC BLOOD PRESSURE: 171 MMHG | BODY MASS INDEX: 26.93 KG/M2 | RESPIRATION RATE: 18 BRPM

## 2019-07-05 DIAGNOSIS — R06.02 SHORTNESS OF BREATH: Primary | ICD-10-CM

## 2019-07-05 DIAGNOSIS — I10 HYPERTENSION, UNSPECIFIED TYPE: ICD-10-CM

## 2019-07-05 DIAGNOSIS — R91.1 LUNG NODULE: ICD-10-CM

## 2019-07-05 PROCEDURE — 99214 OFFICE O/P EST MOD 30 MIN: CPT | Performed by: FAMILY MEDICINE

## 2019-07-05 RX ORDER — METOPROLOL TARTRATE 25 MG/1
25 TABLET, FILM COATED ORAL 2 TIMES DAILY
Qty: 60 TABLET | Refills: 0 | Status: SHIPPED | OUTPATIENT
Start: 2019-07-05 | End: 2019-08-03

## 2019-07-05 ASSESSMENT — ANXIETY QUESTIONNAIRES
7. FEELING AFRAID AS IF SOMETHING AWFUL MIGHT HAPPEN: NOT AT ALL
GAD7 TOTAL SCORE: 0
1. FEELING NERVOUS, ANXIOUS, OR ON EDGE: NOT AT ALL
6. BECOMING EASILY ANNOYED OR IRRITABLE: NOT AT ALL
3. WORRYING TOO MUCH ABOUT DIFFERENT THINGS: NOT AT ALL
4. TROUBLE RELAXING: NOT AT ALL
GAD7 TOTAL SCORE: 0
7. FEELING AFRAID AS IF SOMETHING AWFUL MIGHT HAPPEN: NOT AT ALL
GAD7 TOTAL SCORE: 0
5. BEING SO RESTLESS THAT IT IS HARD TO SIT STILL: NOT AT ALL
2. NOT BEING ABLE TO STOP OR CONTROL WORRYING: NOT AT ALL

## 2019-07-05 ASSESSMENT — PATIENT HEALTH QUESTIONNAIRE - PHQ9
SUM OF ALL RESPONSES TO PHQ QUESTIONS 1-9: 4
10. IF YOU CHECKED OFF ANY PROBLEMS, HOW DIFFICULT HAVE THESE PROBLEMS MADE IT FOR YOU TO DO YOUR WORK, TAKE CARE OF THINGS AT HOME, OR GET ALONG WITH OTHER PEOPLE: SOMEWHAT DIFFICULT
SUM OF ALL RESPONSES TO PHQ QUESTIONS 1-9: 4

## 2019-07-05 NOTE — PATIENT INSTRUCTIONS
To schedule your CT scan of the lungs, call [phone] for [place] scheduling.

## 2019-07-05 NOTE — PROGRESS NOTES
[facility]  Department of Otolaryngology- Head & Neck Surgery  Fernando Hanna MD  [phone]      After hours: [phone]    Home Care Instructions after Nasal/Sinus Surgery    -Do not blow your nose for five days, then only gently for the next week. (If necessary,   sniff, then spit or swallow).  -Sneeze with mouth OPEN only.  -Do not use aspirin, ibuprofen or similar products for one week after surgery.  They can   increase your risk of bleeding.  Tylenol (acetaminophen) is o.k.  -Do not strain when moving your bowels, or lift heavy objects (>25lbs) for 5 days.  -Avoid bending over for five days.  -Elevate your head about 6-12 inches when in bed or consider sleeping in a   recliner for several nights.  -Avoid nasal irritants such as smoke or dust.    For Healing: Use over-the-counter nasal saline spray: 2 sprays each nostril every 2-4 hours for 1 week and consider using a bedside humidifier when sleeping.    For Nasal Congestion: You may experience swelling in your nose for 2-3 weeks after surgery.  For severe nasal congestion, use over-the-counter Afrin (oxymetazolone) Nasal Decongestant 2 sprays each nostril every 12 hours only as needed.    For Bleeding:  Oozing of blood from the nose intermittently is common for the first few days post-operatively.  If bleeding occurs:  -Use Afrin Nasal Decongestant: 3-4 sprays in nostril of bleeding.  -Keep head neutral.  -Squeeze both nostrils tightly for 10 minutes.  -Apply ice/cool pack to nasal bridge.  -Repeat if bleeding persists.  -If taking aspirin pre-operatively, may resume in one week after follow up appointment.    If Septoplasty (surgery on your nasal septum) was performed, there will be a small incision on the left side along the septum (inside front of your nose).  Please clean the incision twice a day with a Q-tip soaked in peroxide, then apply bacitracin ointment or vaseline.  There are usually 2 thin soft  Subjective     Sejal Devlin is a 58 year old female who presents to clinic today for the following health issues:    HPI       Hypertension/ SOB        Do you check your blood pressure regularly outside of the clinic? Yes     Are you following a low salt diet? No    Are your blood pressures ever more than 140 on the top number (systolic) OR more   than 90 on the bottom number (diastolic), for example 140/90? Yes 458/111, 162/100, 132/89, 135/82, 175/110   -Patient is having SOB, on going for 10 years and getting more severe overtime with 97-98% oxygen level, and tends to flare off and on and affecting patient's daily activities       She is shortness of breath with activities. She has not noted ongoing cough nor wheezing with this. She has seen both cardiology and pulmonology without clear etiology. She has had PFTS and ct scan of her lungs along with cardiac testing. She has follow up upcoming with cardiology.     She is overdue for a recheck of her ct scan which showed a nodule.     Ros is neg for msk and other resp, cv symptoms. She is anxious about the shortness of breath. She does not think the sob is anxiety related. She is thinking about consulting at [place].     OBJECTIVE: /87 (BP Location: Left arm, Patient Position: Sitting, Cuff Size: Adult Regular)   Pulse 79   Temp 99  F (37.2  C) (Oral)   Resp 18   Wt 68.9 kg (152 lb)   LMP 12/18/2004   SpO2 98%   BMI 26.93 kg/m   Exam:  GENERAL APPEARANCE: healthy, alert and no distress  EYES: Eyes grossly normal to inspection  HENT: ear canals and TM's normal and nose and mouth without ulcers or lesions  NECK: no adenopathy, no asymmetry, masses, or scars and thyroid normal to palpation  RESP: lungs clear to auscultation - no rales, rhonchi or wheezes  CV: regular rates and rhythm, normal S1 S2, no S3 or S4 and no murmur, click or rub -  ABDOMEN:  soft, nontender, no HSM or masses and bowel sounds normal  MS: extremities normal- no gross deformities  noted, no evidence of inflammation in joints, FROM in all extremities.  SKIN: no suspicious lesions or rashes  PSYCH: anxious     1. Lung nodule  Over due for repeat ct scan.   - CT Chest w/o Contrast; Future    2. Hypertension, unspecified type  she has cardiology follow up. Will use metoprolol for her bp at 25mg twice daily.   - metoprolol tartrate (LOPRESSOR) 25 MG tablet; Take 1 tablet (25 mg) by mouth 2 times daily  Dispense: 60 tablet; Refill: 0    3. Shortness of breath  Unclear etiology. Doubtfully COPD given normal PFTs. Doubt asthma.   She recently had a normal echo. She has cardiology follow up coming up.    sheets of silicone placed on both sides of your nose along the septum.  They are sewn in with a stitch.  It is very uncommon, but if one of the sheets should pop out of your nose, your can either push it back into your nose or cut part of it off and push the remaining sheet back in.    Please contact the office or after hours number listed above if:  -Moderate to severe bleeding continues for over one hour.  -Visual changes or bulging painful eye develop.  -Severe pain/headache occurs.  -Persistent clear fluid leaking from nose for >12 hours.  -Temperature >101ºF (38.3ºC)      Follow up appointment:    4-21-17 at 1:15 pm with Dr. Hanna at [facility]

## 2019-07-06 ASSESSMENT — ANXIETY QUESTIONNAIRES: GAD7 TOTAL SCORE: 0

## 2019-07-06 ASSESSMENT — PATIENT HEALTH QUESTIONNAIRE - PHQ9: SUM OF ALL RESPONSES TO PHQ QUESTIONS 1-9: 4

## 2019-07-09 ENCOUNTER — TELEPHONE (OUTPATIENT)
Dept: PULMONOLOGY | Facility: CLINIC | Age: 59
End: 2019-07-09

## 2019-07-09 ENCOUNTER — ANCILLARY PROCEDURE (OUTPATIENT)
Dept: CT IMAGING | Facility: CLINIC | Age: 59
End: 2019-07-09
Attending: FAMILY MEDICINE
Payer: COMMERCIAL

## 2019-07-09 DIAGNOSIS — R91.1 LUNG NODULE: ICD-10-CM

## 2019-07-09 NOTE — TELEPHONE ENCOUNTER
Returned call to pt who is enquiring whether CT scan ordered by her PCP is an appropriate test to help MD identify what is causing her dyspnea.  I advised that it is one of the tests that can be completed, and referred to Dr. Saez's last office visit note recommending follow up CT.  Pt states she will be having CT today and has no further questions at this time.

## 2019-07-09 NOTE — TELEPHONE ENCOUNTER
[facility] Call Center    Phone Message    May a detailed message be left on voicemail: yes    Reason for Call: Other: Pt is wondering if she can have an order placed to have PFT done, please call her when this order is in     Action Taken: Message routed to:  [facility] ([facility]

## 2019-07-11 ENCOUNTER — OFFICE VISIT (OUTPATIENT)
Dept: CARDIOLOGY | Facility: CLINIC | Age: 59
End: 2019-07-11
Attending: NURSE PRACTITIONER
Payer: COMMERCIAL

## 2019-07-11 ENCOUNTER — ANCILLARY PROCEDURE (OUTPATIENT)
Dept: BONE DENSITY | Facility: CLINIC | Age: 59
End: 2019-07-11
Attending: NURSE PRACTITIONER
Payer: COMMERCIAL

## 2019-07-11 ENCOUNTER — PRE VISIT (OUTPATIENT)
Dept: CARDIOLOGY | Facility: CLINIC | Age: 59
End: 2019-07-11

## 2019-07-11 VITALS
BODY MASS INDEX: 27.24 KG/M2 | HEART RATE: 62 BPM | DIASTOLIC BLOOD PRESSURE: 74 MMHG | WEIGHT: 153.75 LBS | SYSTOLIC BLOOD PRESSURE: 155 MMHG | HEIGHT: 63 IN | OXYGEN SATURATION: 100 %

## 2019-07-11 DIAGNOSIS — I10 BENIGN ESSENTIAL HYPERTENSION: ICD-10-CM

## 2019-07-11 DIAGNOSIS — Z13.820 SCREENING FOR OSTEOPOROSIS: ICD-10-CM

## 2019-07-11 DIAGNOSIS — R06.02 SOB (SHORTNESS OF BREATH): Primary | ICD-10-CM

## 2019-07-11 PROCEDURE — 99205 OFFICE O/P NEW HI 60 MIN: CPT | Performed by: INTERNAL MEDICINE

## 2019-07-11 PROCEDURE — 93005 ELECTROCARDIOGRAM TRACING: CPT | Performed by: INTERNAL MEDICINE

## 2019-07-11 PROCEDURE — 77080 DXA BONE DENSITY AXIAL: CPT | Performed by: INTERNAL MEDICINE

## 2019-07-11 ASSESSMENT — MIFFLIN-ST. JEOR: SCORE: 1241.54

## 2019-07-11 NOTE — PROGRESS NOTES
I am delighted to see Sejal Devlin in consultation for shortness of breath.  She is here with a friend.    History of Present Illness:  As you know, the patient is a 58 year old  Female with 10-year h/o intermittent shortness of breath.  She describes waxing and waning episodes of severe shortness of breath, each can last 2-4 weeks at a time, during which she is short of breath at rest, can only walk half a block without stopping, chest tightness increases with trying to take a deep breath. Episodes will gradually improve, and at baseline she is able to walk about 2 miles within 30 minutes, with some dyspnea but does not need to stop. She will be fine for several months then will gradually become short of breath again. This pattern has cycled for the last 10 years but she thinks now shortness of breath is worse, episodes lasting longer and occurring more frequent. She had a stress test last year on a bicycle during which time she said she had excruciating shortness of breath and was begging for the nurses to let her sit up. She denies palpitations, syncope, no orthopnea.     She had previously seen pulmonary and reportedly had normal PFTs. She has oxygen saturation monitor at home and numbers are normal during symptoms.    The following portions of the patient's history were reviewed and updated as appropriate: allergies, current medications, past family history, past medical history, past social history, past surgical history, and the problem list.    Past Medical History:  1. Hypertension, recent diagnosis. She was checking home BP in June:  159/100, 176/95, 132/89, 156/95. Started metoprolol 2 weeks ago and has not been rechecking BP since  2. Sjogren's syndrome in the past, had seen Dr. Acacia Chandler 11/2018    Medications:   Metoprolol tartrate 25 bid  Vitamin D    Hydroxyzine was prescribed for anti-anxiety but made her sleepy during the day      Allergies:    Allergies   Allergen Reactions     No Known  Drug Allergies      Seasonal Allergies          Family History:   Family History   Problem Relation Age of Onset     C.A.D. Mother         50s     Cancer - colorectal Mother          age 64, diagnosed age 54     Circulatory Mother      Coronary Artery Disease Mother      Colon Cancer Mother      Hypertension Mother      C.A.D. Father      Eye Disorder Father         glaucoma     Lipids Father      Cancer Father         kidney     Hyperlipidemia Father      Substance Abuse Brother      Cardiovascular Brother         MI, stent at 52     Connective Tissue Disorder Other         cousin on father side has lupus     Coronary Artery Disease Brother      Coronary Artery Disease Brother      Substance Abuse Brother        Psychosocial history:  reports that she quit smoking about 11 years ago. Her smoking use included cigarettes. She started smoking about 17 years ago. She has a 10.00 pack-year smoking history. She has never used smokeless tobacco. She reports that she drinks alcohol. She reports that she does not use drugs.    Review of systems:   Cardiovascular: No palpitations, chest pain, shortness of breath at rest, dyspnea with exertion, orthopnea, paroxysmal nocturia dyspnea, nocturia, dizziness, syncope.    In addition,   Constitutional: No change in weight, sleep or appetite.  Normal energy.  No fever or chills  Eyes: Negative for vision changes or eye problems  ENT: No problems with ears, nose or throat.  No difficulty swallowing.  Resp: No coughing, wheezing or shortness of breath  GI: No nausea, vomiting,  heartburn, abdominal pain, diarrhea, constipation or change in bowel habits  : No urinary frequency or dysuria, bladder or kidney problems  Musculoskeletal: No significant muscle or joint pains  Neurologic: No headaches, numbness, tingling, weakness, problems with balance or coordination  Psychiatric: No problems with anxiety, depression or mental health  Heme/immune/allergy: No history of bleeding or  "clotting problems or anemia.  No allergies or immune system problems  Integumentary: No rashes,worrisome lesions or skin problems      Physical examination  Vitals: /74 (BP Location: Left arm, Patient Position: Sitting, Cuff Size: Adult Regular)   Pulse 62   Ht 1.6 m (5' 3\")   Wt 69.7 kg (153 lb 12 oz)   LMP 2004   SpO2 100%   BMI 27.24 kg/m    BMI= Body mass index is 27.24 kg/m .    Constitutional: In general, the patient is a pleasant female, frustrated. Appears to want to be taking deep breaths.  Eyes: PERRLA.  EOMI.  Sclerae white, not injected.  ENT/mouth: Normiocephalic and atraumatic.  Nares clear.  Pharynx without erythema or exudate.  Dentition intact.  No adenopathy.  No thyromegaly. Carotids +2/2 bilaterally without bruits.  No jugular venous distension.   Card/Vasc: The PMI is in the 5th ICS in the midclavicular line. There is no heave. Regular rate and rhythm. Normal S1, S2. No murmur, rub, click, or gallop. Pulses are normal bilaterally throughout. No peripheral edema.  Respiratory: Clear to asculation.  No ronchi, wheezes, rales.  No dullness to percussion.   GI: Abdomen is soft, nontender, nondistended. No organomegaly. No AAA.  No bruits.   Integument: No significant bruises or rashes  Neurological: The neurological examination reveal a patient who was oriented to person, place, and time.    Psych: Normal  Heme/Lymph/Immun: no significant adenopathy    I have reviewed the following labs/imaging:  Labs:  11/15/18 - cholesterol 232, HDL 54, , , cr 0.8, hgb 13, plt 262K  Echo: 19: EF 55-60%, normal diastolic function, no valve disease  Stress echo 18: normal, no ischemia, target heart rate achieved, exercised for 6;15 minutes    I have personally and independently reviewed the following:  EK/11/10 - sinus, normal intervals      Assessment :  1. Shortness of breath; episodic. No objective abnormalities found. She had severe short of breath during stress echo " which was normal. Doubt coronary disease but will get coronary CT angio. Also consider exercise-induced pulmonary hypertension however, symptoms are episodic. Wondered if she's ever had evaluations during an episode, especially since her episodes can last several weeks - consider PFT, rheumatologic work up, right heart cath DURING an episode.  2. Hypertension. Not controlled, however she recently started metoprolol. Recommend that she checks her BP at home to see if metoprolol dose needs to be adjusted.      Plan:  Coronary CT angiogram  Check BP at home and f/u with PCP for med changes  Consider evaluation during an episode of shortness of breath, including right heart cath      I spent a total of 30 minutes face to face with  Sejal Devlin during today's office visit. Over 50% of this time was spent counseling the patient and/or coordinating care regarding management strategies.      The patient is to return pending above . The patient understood the treatment plan as outlined above.  There were no barriers to learning.      Becca Medina MD

## 2019-07-11 NOTE — PATIENT INSTRUCTIONS
You were seen today in the Cardiovascular Clinic at [facility] [place].     Cardiology Providers you saw during your visit: Dr. Becca Medina    Diagnosis:  Shortness of breath    Results: discussed with patient      Orders:   none    Recommendations:   1. Check blood pressure at home  2. CT Angiogram- Will send you results via [employer]      Follow-up:    As needed pending results of CT angiogram      For medication refills please contact your Primary Care Provider for future refills.     Please follow up with primary care provider for medication refills     Please feel free to call me with any questions or concerns.        Questions and [phone]      For Radiology / Imaging [phone]       On Call Cardiologist for after hours or on weekends: [phone]   option #4 and ask to speak to the on-call Cardiologist.          If you need a medication refill please contact your pharmacy.  Please allow 3 business days for your refill to be completed.    You are scheduled for a CT Coronary Angiogram at the [facility] [place].   Please report to the [facility] Waiting Room in the [facility].    Follow these instructions:  1.The day before the test drink extra water and also on the day of the test.  2. Nothing to eat or drink except water 3 hours prior.  3. No caffeine, smoking, or strenuous activity before test.    4. You will be receiving contrast. You will need pre-treatment if you have allergies to contrast dye or shellfish.   5. You will need to have a current creatinine level within 30 days if you are over the age of 69, diabetic, or have a history of kidney problems.   6. HOLD these medications:   1. oral diabetic medications the morning of and wait 48 hours before  re-starting metformin.     2. Diuretic the day of.   3. NSAIDS (ibuprofen, naproxen) day of.  7. If your heart rate is above 90, please take this beta blocker the day or on day prior:  metoprolol   8. Testing takes about 15 min. Please allow 2 hours for test as you may need medications to slow your heart rate for the test.      If you have further questions, please utilize [employer] to contact us.   If your question concerns the above instructions, contact:  Nurse Care Coordinator- Heart Care  [phone]

## 2019-07-11 NOTE — LETTER
7/11/2019      RE: Sejal Devlin  [address] [place] [address]       Dear Colleague,    Thank you for the opportunity to participate in the care of your patient, Sejal Devlin, at the [facility]. Please see a copy of my visit note below.    I am delighted to see Sejal Devlin in consultation for shortness of breath.  She is here with a friend.    History of Present Illness:  As you know, the patient is a 58 year old  Female with 10-year h/o intermittent shortness of breath.  She describes waxing and waning episodes of severe shortness of breath, each can last 2-4 weeks at a time, during which she is short of breath at rest, can only walk half a block without stopping, chest tightness increases with trying to take a deep breath. Episodes will gradually improve, and at baseline she is able to walk about 2 miles within 30 minutes, with some dyspnea but does not need to stop. She will be fine for several months then will gradually become short of breath again. This pattern has cycled for the last 10 years but she thinks now shortness of breath is worse, episodes lasting longer and occurring more frequent. She had a stress test last year on a bicycle during which time she said she had excruciating shortness of breath and was begging for the nurses to let her sit up. She denies palpitations, syncope, no orthopnea.     She had previously seen pulmonary and reportedly had normal PFTs. She has oxygen saturation monitor at home and numbers are normal during symptoms.    The following portions of the patient's history were reviewed and updated as appropriate: allergies, current medications, past family history, past medical history, past social history, past surgical history, and the problem list.    Past Medical History:  1. Hypertension, recent diagnosis. She was checking home BP in June:  159/100, 176/95, 132/89,  156/95. Started metoprolol 2 weeks ago and has not been rechecking BP since  2. Sjogren's syndrome in the past, had seen Dr. Acacia Chandler 2018    Medications:   Metoprolol tartrate 25 bid  Vitamin D    Hydroxyzine was prescribed for anti-anxiety but made her sleepy during the day      Allergies:    Allergies   Allergen Reactions     No Known Drug Allergies      Seasonal Allergies          Family History:   Family History   Problem Relation Age of Onset     C.A.D. Mother         50s     Cancer - colorectal Mother          age 64, diagnosed age 54     Circulatory Mother      Coronary Artery Disease Mother      Colon Cancer Mother      Hypertension Mother      C.A.D. Father      Eye Disorder Father         glaucoma     Lipids Father      Cancer Father         kidney     Hyperlipidemia Father      Substance Abuse Brother      Cardiovascular Brother         MI, stent at 52     Connective Tissue Disorder Other         cousin on father side has lupus     Coronary Artery Disease Brother      Coronary Artery Disease Brother      Substance Abuse Brother        Psychosocial history:  reports that she quit smoking about 11 years ago. Her smoking use included cigarettes. She started smoking about 17 years ago. She has a 10.00 pack-year smoking history. She has never used smokeless tobacco. She reports that she drinks alcohol. She reports that she does not use drugs.    Review of systems:   Cardiovascular: No palpitations, chest pain, shortness of breath at rest, dyspnea with exertion, orthopnea, paroxysmal nocturia dyspnea, nocturia, dizziness, syncope.    In addition,   Constitutional: No change in weight, sleep or appetite.  Normal energy.  No fever or chills  Eyes: Negative for vision changes or eye problems  ENT: No problems with ears, nose or throat.  No difficulty swallowing.  Resp: No coughing, wheezing or shortness of breath  GI: No nausea, vomiting,  heartburn, abdominal pain, diarrhea, constipation or change  "in bowel habits  : No urinary frequency or dysuria, bladder or kidney problems  Musculoskeletal: No significant muscle or joint pains  Neurologic: No headaches, numbness, tingling, weakness, problems with balance or coordination  Psychiatric: No problems with anxiety, depression or mental health  Heme/immune/allergy: No history of bleeding or clotting problems or anemia.  No allergies or immune system problems  Integumentary: No rashes,worrisome lesions or skin problems      Physical examination  Vitals: /74 (BP Location: Left arm, Patient Position: Sitting, Cuff Size: Adult Regular)   Pulse 62   Ht 1.6 m (5' 3\")   Wt 69.7 kg (153 lb 12 oz)   LMP 12/18/2004   SpO2 100%   BMI 27.24 kg/m     BMI= Body mass index is 27.24 kg/m .    Constitutional: In general, the patient is a pleasant female, frustrated. Appears to want to be taking deep breaths.  Eyes: PERRLA.  EOMI.  Sclerae white, not injected.  ENT/mouth: Normiocephalic and atraumatic.  Nares clear.  Pharynx without erythema or exudate.  Dentition intact.  No adenopathy.  No thyromegaly. Carotids +2/2 bilaterally without bruits.  No jugular venous distension.   Card/Vasc: The PMI is in the 5th ICS in the midclavicular line. There is no heave. Regular rate and rhythm. Normal S1, S2. No murmur, rub, click, or gallop. Pulses are normal bilaterally throughout. No peripheral edema.  Respiratory: Clear to asculation.  No ronchi, wheezes, rales.  No dullness to percussion.   GI: Abdomen is soft, nontender, nondistended. No organomegaly. No AAA.  No bruits.   Integument: No significant bruises or rashes  Neurological: The neurological examination reveal a patient who was oriented to person, place, and time.    Psych: Normal  Heme/Lymph/Immun: no significant adenopathy    I have reviewed the following labs/imaging:  Labs:  11/15/18 - cholesterol 232, HDL 54, , , cr 0.8, hgb 13, plt 262K  Echo: 6/27/19: EF 55-60%, normal diastolic function, no " valve disease  Stress echo 18: normal, no ischemia, target heart rate achieved, exercised for 6;15 minutes    I have personally and independently reviewed the following:  EK/11/10 - sinus, normal intervals      Assessment :  1. Shortness of breath; episodic. No objective abnormalities found. She had severe short of breath during stress echo which was normal. Doubt coronary disease but will get coronary CT angio. Also consider exercise-induced pulmonary hypertension however, symptoms are episodic. Wondered if she's ever had evaluations during an episode, especially since her episodes can last several weeks - consider PFT, rheumatologic work up, right heart cath DURING an episode.  2. Hypertension. Not controlled, however she recently started metoprolol. Recommend that she checks her BP at home to see if metoprolol dose needs to be adjusted.      Plan:  Coronary CT angiogram  Check BP at home and f/u with PCP for med changes  Consider evaluation during an episode of shortness of breath, including right heart cath      I spent a total of 30 minutes face to face with  Sejal Devlin during today's office visit. Over 50% of this time was spent counseling the patient and/or coordinating care regarding management strategies.      The patient is to return pending above . The patient understood the treatment plan as outlined above.  There were no barriers to learning.      Becca Medina MD

## 2019-07-18 NOTE — RESULT ENCOUNTER NOTE
"Angela Power,    This note is to let you know that your DEXA scan is stable.      \"Compared to previous bone densitometry performed on this patient, there is the suggestion of no significant change of the lumbar spine, and possibly significant worsening of the (total) hip.\"    You continue to show osteopenia.  It is recommend that you get 1500 mg of calcium a day and 2000 units of vitamin D3 every day.    Dr. Evans who read the report did suggest that we could consider a medication for bone loss (such as fosamax, actonel, boniva, etc) \"in the short term.\"    Let me know your thoughts on starting a medication.    Marita Freitas APRN CNP    "

## 2019-07-19 ENCOUNTER — HOSPITAL ENCOUNTER (OUTPATIENT)
Dept: CT IMAGING | Facility: CLINIC | Age: 59
Discharge: HOME | End: 2019-07-19
Attending: INTERNAL MEDICINE | Admitting: INTERNAL MEDICINE
Payer: COMMERCIAL

## 2019-07-19 VITALS — HEART RATE: 53 BPM | DIASTOLIC BLOOD PRESSURE: 68 MMHG | RESPIRATION RATE: 16 BRPM | SYSTOLIC BLOOD PRESSURE: 112 MMHG

## 2019-07-19 DIAGNOSIS — R06.02 SOB (SHORTNESS OF BREATH): ICD-10-CM

## 2019-07-19 PROCEDURE — 75574 CT ANGIO HRT W/3D IMAGE: CPT | Mod: 26 | Performed by: INTERNAL MEDICINE

## 2019-07-19 PROCEDURE — 25000128 H RX IP 250 OP 636: Performed by: INTERNAL MEDICINE

## 2019-07-19 PROCEDURE — 75574 CT ANGIO HRT W/3D IMAGE: CPT

## 2019-07-19 PROCEDURE — 25000132 ZZH RX MED GY IP 250 OP 250 PS 637: Performed by: INTERNAL MEDICINE

## 2019-07-19 RX ORDER — NITROGLYCERIN 0.4 MG/1
.4-.8 TABLET SUBLINGUAL
Status: DISCONTINUED | OUTPATIENT
Start: 2019-07-19 | End: 2019-07-20 | Stop reason: HOSPADM

## 2019-07-19 RX ORDER — ACYCLOVIR 200 MG/1
0-1 CAPSULE ORAL
Status: DISCONTINUED | OUTPATIENT
Start: 2019-07-19 | End: 2019-07-20 | Stop reason: HOSPADM

## 2019-07-19 RX ORDER — METOPROLOL TARTRATE 1 MG/ML
5-15 INJECTION, SOLUTION INTRAVENOUS
Status: DISCONTINUED | OUTPATIENT
Start: 2019-07-19 | End: 2019-07-20 | Stop reason: HOSPADM

## 2019-07-19 RX ORDER — METOPROLOL TARTRATE 50 MG
50-100 TABLET ORAL
Status: COMPLETED | OUTPATIENT
Start: 2019-07-19 | End: 2019-07-19

## 2019-07-19 RX ORDER — IOPAMIDOL 755 MG/ML
115 INJECTION, SOLUTION INTRAVASCULAR ONCE
Status: COMPLETED | OUTPATIENT
Start: 2019-07-19 | End: 2019-07-19

## 2019-07-19 RX ADMIN — IOPAMIDOL 115 ML: 755 INJECTION, SOLUTION INTRAVENOUS at 13:00

## 2019-07-19 RX ADMIN — METOPROLOL TARTRATE 100 MG: 100 TABLET, FILM COATED ORAL at 11:55

## 2019-07-19 RX ADMIN — NITROGLYCERIN 0.8 MG: 0.4 TABLET SUBLINGUAL at 13:06

## 2019-07-19 NOTE — PROGRESS NOTES
Pt arrived for Coronary CT angiogram. Test, meds and side effects reviewed with pt.  Resting HR greater than 73 bpm. Given 100 mg PO Metoprolol per verbal order. HR remained >60 one hour after PO administration. Administered 0.8 mg SL nitro on CTA table per order. CTA completed; tolerated procedure well. Post monitoring completed and VSS. D/C instructions reviewed with pt whom verbalized understanding of need to increase PO fluids today. D/C to gold waiting room accompanied by staff.

## 2019-07-23 ENCOUNTER — MYC MEDICAL ADVICE (OUTPATIENT)
Dept: FAMILY MEDICINE | Facility: CLINIC | Age: 59
End: 2019-07-23

## 2019-07-25 NOTE — TELEPHONE ENCOUNTER
"[facility] but this insurance plan is \"Limited Access [employer] [place] [employer]\". What this means is [facility] is out of network and even if Marita Zena would submit a referral it would not be accepted by this insurance plan because there are Pulmonologist within her network.    Spoke with Teresa at [facility] ([phone] Reference # [id number]) who confirmed the above information about  [facility]. Pt called [facility] twice. The first time the representative told pt she could not go to [facility] and the second time she talked to Patty and was told she could go to [place] with a referral from her PCP. Hence, the call to  HP looking for a Pulmonology referral to [facility]. Unfortunately,  Patty gave pt incorrect information as confirmed by Teresa and will be retrained.    Directed pt's care to [facility] [address] [address] [phone].    Liudmila Ware [facility] Referral Rep    "

## 2019-07-31 ENCOUNTER — MYC MEDICAL ADVICE (OUTPATIENT)
Dept: FAMILY MEDICINE | Facility: CLINIC | Age: 59
End: 2019-07-31

## 2019-08-01 NOTE — TELEPHONE ENCOUNTER
Cardiology care team for Dr. Medina can you please review this patients my chart message and Dr. Medina visit notes and advise patient?   My direction was that if her shortness of breath is significant then she needs to go to the ED.  Mariam Higgins RN  Triage [place]

## 2019-08-01 NOTE — TELEPHONE ENCOUNTER
"Notified of yellow flag: patient sent Chois[employer] message c/o shortness of breath and inability to catch breath. Message forwarded from triage.    Attempted to call patient back. Left VM discussing that patient needs to immediately call 911 or go to nearest emergency department should she be experiencing shortness of breath and inability to catch breath.    Also discussed in VM that per Dr. Becca Medina's last visit note, we may want a workup of the following during an episode: \"consider PFT, rheumatologic work up, right heart cath DURING an episode.\" (See visit note 7/11/2019). CT coronary angiogram was ordered last visit and completed 7/19/2019, results were normal.    Instructed patient to call back at my direct line, [phone]. [employer] message sent to patient detailing the above information also.    Inbox message sent to Dr. Becca Medina regarding situation.    Sonali Huerta, INGRIDN, RN, PHN  Electrophysiology Nurse Coordinator      "

## 2019-08-01 NOTE — TELEPHONE ENCOUNTER
Again called patient to assess symptoms. Pt answered phone call.    Jannet says she is doing very poorly. She is unable to catch her breath at times and is having trouble breathing. Discussed with Jannet that it is best that she goes to emergency room as soon as possible.    Jannet is agreeable to plan. Jannet says she will go to ER today to get assessed.     Sonali Huerta, INGRIDN, RN, PHN  Electrophysiology Nurse Coordinator

## 2019-08-03 DIAGNOSIS — I10 HYPERTENSION, UNSPECIFIED TYPE: ICD-10-CM

## 2019-08-03 NOTE — TELEPHONE ENCOUNTER
"Requested Prescriptions   Pending Prescriptions Disp Refills     metoprolol tartrate (LOPRESSOR) 25 MG tablet [Pharmacy Med Name: METOPROLOL TARTRATE 25MG TABLETS]  Last Written Prescription Date:  7/5/2019  Last Fill Quantity: 60 tabs,  # refills: 0   Last office visit: 7/5/2019 with prescribing provider:  Brain   Future Office Visit:     60 tablet 0     Sig: TAKE 1 TABLET(25 MG) BY MOUTH TWICE DAILY       Beta-Blockers Protocol Passed - 8/3/2019  2:01 PM        Passed - Blood pressure under 140/90 in past 12 months     BP Readings from Last 3 Encounters:   07/19/19 112/68   07/11/19 155/74   07/05/19 171/87                 Passed - Patient is age 6 or older        Passed - Recent (12 mo) or future (30 days) visit within the authorizing provider's specialty     Patient had office visit in the last 12 months or has a visit in the next 30 days with authorizing provider or within the authorizing provider's specialty.  See \"Patient Info\" tab in inbasket, or \"Choose Columns\" in Meds & Orders section of the refill encounter.              Passed - Medication is active on med list          "

## 2019-08-05 ENCOUNTER — CARE COORDINATION (OUTPATIENT)
Dept: CARDIOLOGY | Facility: CLINIC | Age: 59
End: 2019-08-05

## 2019-08-05 NOTE — PROGRESS NOTES
"Jannet left a VM for me over the weekend stating that she was interested in discussing \"plan of action.\"     Noted in Dr. Medina's last note with patient:         CT coronary angiogram was ordered last visit and completed 7/19/2019, results were normal.    Last week (please see care coordination notes 8/1/19), patient c/o shortness of breath and the inability to catch her breath. Writer recommended she go to ED at this time.     Called Jannet back today. Jannet says that she didn't go to the ED, but \"its in her back pocket of things to do.\" She wanted to know what to do from here. Discussed with her the recommendations for Dr. Medina's note. Although the SOB \"comes and goes,\" pt expressed interest in getting repeat PFTs done and going forward with rheumatology work-up. Discussed with patient that I'm unsure of what the work-up will consist of. Patient says she used to see a rheumatology provider for Sjogren's Syndrome (Dr. Acacia Chandler MD @ [facility]). Her last visit, according to chart, was 10/11/2018. Discussed with patient that it may be beneficial to make a follow-up appointment and discuss her recent concerns with Dr. Chandler. Patient said \"it's hard to get in, so I've been putting it off.\" Discussed with patient that She needs to schedule a follow-up with Dr. Chandler or another provider in Rheumatology. In the mean time, I will forward Dr. Medina patient request to complete PFTs and \"rheumatology workup,\" should that be labs or other testing that she can get in the meantime before rheumatology appointment.    Jannet is aware that Dr. Medina is on vacation and that the earliest I can get back to her is next week. Patient agreed to make appointment with rheumatology in mean time.     Again told patient to go to ED should she be experiencing sustained shortness of breath, inability to catch breath, or chest pain. Pt verbalized understanding of this.     Will forward to Dr. Medina for review.     Sonali Huerta, INGRIDN, RN, PHN  Electrophysiology " Nurse Coordinator

## 2019-08-07 RX ORDER — METOPROLOL TARTRATE 25 MG/1
TABLET, FILM COATED ORAL
Qty: 60 TABLET | Refills: 1 | Status: SHIPPED | OUTPATIENT
Start: 2019-08-07 | End: 2019-10-15

## 2019-08-15 ENCOUNTER — CARE COORDINATION (OUTPATIENT)
Dept: CARDIOLOGY | Facility: CLINIC | Age: 59
End: 2019-08-15

## 2019-08-15 DIAGNOSIS — R06.02 SOB (SHORTNESS OF BREATH): Primary | ICD-10-CM

## 2019-09-30 ENCOUNTER — HEALTH MAINTENANCE LETTER (OUTPATIENT)
Age: 59
End: 2019-09-30

## 2019-10-02 ENCOUNTER — ANCILLARY PROCEDURE (OUTPATIENT)
Dept: MAMMOGRAPHY | Facility: CLINIC | Age: 59
End: 2019-10-02
Attending: NURSE PRACTITIONER
Payer: COMMERCIAL

## 2019-10-02 DIAGNOSIS — Z12.39 SCREENING FOR BREAST CANCER: ICD-10-CM

## 2019-10-02 PROCEDURE — 77063 BREAST TOMOSYNTHESIS BI: CPT | Mod: TC

## 2019-10-02 PROCEDURE — 77067 SCR MAMMO BI INCL CAD: CPT | Mod: TC

## 2019-10-08 ENCOUNTER — OFFICE VISIT (OUTPATIENT)
Dept: FAMILY MEDICINE | Facility: CLINIC | Age: 59
End: 2019-10-08
Payer: COMMERCIAL

## 2019-10-08 VITALS
RESPIRATION RATE: 16 BRPM | WEIGHT: 150 LBS | HEART RATE: 59 BPM | TEMPERATURE: 97.5 F | SYSTOLIC BLOOD PRESSURE: 100 MMHG | HEIGHT: 63 IN | DIASTOLIC BLOOD PRESSURE: 64 MMHG | BODY MASS INDEX: 26.58 KG/M2 | OXYGEN SATURATION: 95 %

## 2019-10-08 DIAGNOSIS — K22.70 BARRETT'S ESOPHAGUS WITHOUT DYSPLASIA: Primary | ICD-10-CM

## 2019-10-08 DIAGNOSIS — Z23 NEED FOR PROPHYLACTIC VACCINATION AND INOCULATION AGAINST INFLUENZA: ICD-10-CM

## 2019-10-08 DIAGNOSIS — N89.8 VAGINAL DRYNESS: ICD-10-CM

## 2019-10-08 DIAGNOSIS — F41.8 SITUATIONAL ANXIETY: ICD-10-CM

## 2019-10-08 DIAGNOSIS — R06.02 SHORTNESS OF BREATH: ICD-10-CM

## 2019-10-08 PROCEDURE — 90682 RIV4 VACC RECOMBINANT DNA IM: CPT | Performed by: NURSE PRACTITIONER

## 2019-10-08 PROCEDURE — 90471 IMMUNIZATION ADMIN: CPT | Performed by: NURSE PRACTITIONER

## 2019-10-08 PROCEDURE — 99214 OFFICE O/P EST MOD 30 MIN: CPT | Mod: 25 | Performed by: NURSE PRACTITIONER

## 2019-10-08 RX ORDER — OMEPRAZOLE 40 MG/1
40 CAPSULE, DELAYED RELEASE ORAL DAILY
Qty: 90 CAPSULE | Refills: 3 | Status: SHIPPED | OUTPATIENT
Start: 2019-10-08 | End: 2020-07-16

## 2019-10-08 ASSESSMENT — MIFFLIN-ST. JEOR: SCORE: 1219.53

## 2019-10-08 NOTE — PROGRESS NOTES
"Chief Complaint   Patient presents with     Shortness of Breath     discuss sung options     Sexual Problem     painful intercourse x after menopause     Flu Shot     Subjective     Sejal Devlin is a 59 year old female who presents to clinic today for the following health issues:  Chief Complaint   Patient presents with     Shortness of Breath     discuss sung options     Sexual Problem     painful intercourse x after menopause     Flu Shot     Imm/Inj     Flu Shot         Sejal has been having problems for months with complaints of shortness of breath:  She stopped drinking. \"I think I was self medicating.\"  She will have symptoms that wax and wane where she has trouble getting a deep breath and feels short of breath.  She has a sensation in her mid center/right chest that \"it feels like something is in there.\"  At first after she stopped drinking, she thought it helped her shortness of breath.  Slowly, her SOB symptoms have returned again and are increasing in frequency and intensity.  She has seen the pulmonologist and cardiology.  1 year ago, she had an upper GI endoscopy.  4/2018 diagnosed with Barretts by UGI endoscopy.  At first she took omeprazole 40 mg a day, but she did not like it so she stopped.  Today she is uncertain if this is a GERD related issue.    Allergist:  Scratch test was done.  NEGATIVE RESULTS    Anxiety:  Better.  On meds.    Vaginal dryness:  She is wondering about options for vaginal dryness treatment.    She does not want to go on oral hormone therapy.      Patient Active Problem List   Diagnosis     Osteopenia     Hyperlipidemia LDL goal <160     Family history of colon cancer     Rash     Circulation problem     Dry mouth     Vitamin D deficiency     Sjogren's syndrome (H)     Leo esophagus     Hyperplastic colon polyp     Past Surgical History:   Procedure Laterality Date     APPENDECTOMY  1977     COLONOSCOPY  2013     COLONOSCOPY Left 1/22/2019    Procedure: COMBINED " COLONOSCOPY, SINGLE OR MULTIPLE BIOPSY/POLYPECTOMY BY BIOPSY;  Surgeon: Olesya Phipps MD;  [facility]     ESOPHAGOSCOPY, GASTROSCOPY, DUODENOSCOPY (EGD), COMBINED N/A 2018    Procedure: COMBINED ESOPHAGOSCOPY, GASTROSCOPY, DUODENOSCOPY (EGD);  egd;  Surgeon: Poonam Alatorre MD;  [facility]     ESOPHAGOSCOPY, GASTROSCOPY, DUODENOSCOPY (EGD), COMBINED N/A 2018    Procedure: COMBINED ESOPHAGOSCOPY, GASTROSCOPY, DUODENOSCOPY (EGD), BIOPSY SINGLE OR MULTIPLE;;  Surgeon: Poonam Alatorre MD;  [facility]     GYN SURGERY           HC REMOVAL GALLBLADDER      Cholecystectomy     ORTHOPEDIC SURGERY      Broken right radius     SURGICAL HISTORY OF -       c- section       Social History     Tobacco Use     Smoking status: Former Smoker     Packs/day: 1.00     Years: 10.00     Pack years: 10.00     Types: Cigarettes     Start date: 2002     Last attempt to quit: 2008     Years since quittin.7     Smokeless tobacco: Never Used     Tobacco comment: smoked age [age], quit x 1 yr and resumed then quit after one yr   Substance Use Topics     Alcohol use: Yes     Comment: 1-2 per day     Family History   Problem Relation Age of Onset     C.A.D. Mother         50s     Cancer - colorectal Mother          age 64, diagnosed age 54     Circulatory Mother      Coronary Artery Disease Mother      Colon Cancer Mother      Hypertension Mother      C.A.D. Father      Eye Disorder Father         glaucoma     Lipids Father      Cancer Father         kidney     Hyperlipidemia Father      Substance Abuse Brother      Cardiovascular Brother         MI, stent at 52     Connective Tissue Disorder Other         cousin on father side has lupus     Coronary Artery Disease Brother      Coronary Artery Disease Brother      Substance Abuse Brother          Current Outpatient Medications   Medication Sig Dispense Refill     FLUoxetine (PROZAC) 10 MG capsule Take 1 capsule (10  mg) by mouth daily 30 capsule 2     LORazepam (ATIVAN) 0.5 MG tablet Take 1 tablet (0.5 mg) by mouth every 8 hours as needed for anxiety 10 tablet 1     metoprolol tartrate (LOPRESSOR) 25 MG tablet TAKE 1 TABLET(25 MG) BY MOUTH TWICE DAILY 60 tablet 1     omeprazole (PRILOSEC) 40 MG DR capsule Take 1 capsule (40 mg) by mouth daily 90 capsule 3     VITAMIN D, CHOLECALCIFEROL, PO Take by mouth daily       GNP GARLIC EXTRACT PO        Allergies   Allergen Reactions     No Known Drug Allergies      Seasonal Allergies      Recent Labs   Lab Test 11/15/18  0810 10/17/18  1123 10/12/17  1530 09/26/16  1627 08/29/14  1706 03/05/14  1619 10/21/13  1428 06/27/13  1451  05/22/12  0839   A1C  --   --   --   --   --  5.7  --   --   --   --    *  --   --  115*  --   --   --   --   --  147*   HDL 54  --   --  47*  --   --   --   --   --  49*   TRIG 106  --   --  237*  --   --   --   --   --  151*   ALT  --  20 23  --  22 25 29 23   < >  --    CR  --  0.82 0.85  --  0.77 0.85 0.73 0.74   < >  --    GFRESTIMATED  --  72 69  --  77 70 83 82   < >  --    GFRESTBLACK  --  87 83  --  >90   GFR Calc   84 >90 >90   < >  --    POTASSIUM  --   --   --   --   --  4.0 5.3 4.6   < >  --    TSH  --   --   --   --   --   --  1.24 0.85  --   --     < > = values in this interval not displayed.      BP Readings from Last 3 Encounters:   10/08/19 100/64   07/19/19 112/68   07/11/19 155/74    Wt Readings from Last 3 Encounters:   10/08/19 68 kg (150 lb)   07/11/19 69.7 kg (153 lb 12 oz)   07/05/19 68.9 kg (152 lb)            Reviewed and updated as needed this visit by Provider         Review of Systems   ROS COMP: Constitutional, HEENT, cardiovascular, pulmonary, GI, , musculoskeletal, neuro, skin, endocrine and psych systems are negative, except as otherwise noted.      Objective    /64 (BP Location: Right arm, Patient Position: Sitting, Cuff Size: Adult Large)   Pulse 59   Temp 97.5  F (36.4  C) (Oral)   Resp 16   " Ht 1.6 m (5' 3\")   Wt 68 kg (150 lb)   LMP 12/18/2004   SpO2 95%   BMI 26.57 kg/m    Body mass index is 26.57 kg/m .  Physical Exam   GENERAL: healthy, alert and no distress  EYES: Eyes grossly normal to inspection, PERRL and conjunctivae and sclerae normal  HENT: ear canals and TM's normal, nose and mouth without ulcers or lesions  NECK: no adenopathy, no asymmetry, masses, or scars and thyroid normal to palpation  RESP: lungs clear to auscultation - no rales, rhonchi or wheezes  CV: regular rate and rhythm, normal S1 S2, no S3 or S4, no murmur, click or rub, no peripheral edema and peripheral pulses strong  ABDOMEN: soft, nontender, no hepatosplenomegaly, no masses and bowel sounds normal  MS: no gross musculoskeletal defects noted, no edema  SKIN: no suspicious lesions or rashes  NEURO: Normal strength and tone, mentation intact and speech normal  PSYCH: mentation appears normal, affect normal/bright    Diagnostic Test Results:  Labs, imaging, and procedures reviewed in [facility]          Assessment & Plan     (K22.70) Leo's esophagus without dysplasia  (primary encounter diagnosis)  Comment:   Plan: GASTROENTEROLOGY ADULT REF CONSULT ONLY,         omeprazole (PRILOSEC) 40 MG DR capsule          I had a long discussion with Roberto today about her shortness of breath history, Leo's, treatment, pulmonology, cardiology etc.  I do give her a lot of credit for completing all these appointments to try to get to the bottom of what is causing her symptoms.  I did tell her that I am not sure if the Leo's esophagitis has anything to do with her current symptoms or not, but I do want her to restart the omeprazole 40 mg once a day.  I talked her about potential side effects of medication, potential long term use issues with this medication, but also that it is safer to treat her GERD and prevent additional esophageal irritation/Leo's because of the acid.  Because of the Leo's and the sensation in chest, I " "do want her to follow-up with gastroenterology for a consultation and likely upper GI endoscopy.  I did give her a referral both to [facility] as well as [facility] and she will likely follow-up with [facility].  She is to follow-up with me in 3 to 6 months, sooner problems.    (R06.02) Shortness of breath  Comment: Improving  Plan: I congratulated her for stopping drinking and I do think this is helping her symptoms.  Nonetheless, I do want her to start the omeprazole and follow-up with [facility] as above.    (N89.8) Vaginal dryness  Comment:   Plan: I did talk to Roberto today about treatment options for vaginal dryness including over-the-counter lubricants, intravaginal hormone therapy, and oral hormone therapy.  She does not want oral therapy.  She is not interested today in vaginal hormone treatments.  She is most interested in trialing over-the-counter products and I did talk to her about K-Y jelly, Astra glide, Replens, etc.  I also offered a referral to follow-up with OB/GYN which she declined today.    (Z23) Need for prophylactic vaccination and inoculation against influenza  Comment: Routine  Plan: INFLUENZA QUAD, RECOMBINANT, P-FREE (RIV4)         (FLUBLOCK) [15218], Vaccine Administration,         Initial [27227]        Given       BMI:   Estimated body mass index is 26.57 kg/m  as calculated from the following:    Height as of this encounter: 1.6 m (5' 3\").    Weight as of this encounter: 68 kg (150 lb).   Weight management plan: Discussed healthy diet and exercise guidelines    See Patient Instructions    Return in about 3 months (around 1/8/2020) for Follow up regarding today's concerns.    JONATAN Peraza [facility]        "

## 2019-10-09 NOTE — TELEPHONE ENCOUNTER
"Requested Prescriptions   Pending Prescriptions Disp Refills     FLUoxetine (PROZAC) 10 MG capsule [Pharmacy Med Name: FLUOXETINE 10MG CAPSULES]  Last Written Prescription Date:  7-16-19  Last Fill Quantity: 30 cap,  # refills: 2   Last office visit: 10/8/2019 with prescribing provider:  Marita Freitas   Future Office Visit:   90 capsule 0     Sig: TAKE 1 CAPSULE BY MOUTH EVERY DAY.       SSRIs Protocol Passed - 10/8/2019  8:12 PM  PHQ-9 SCORE 7/5/2019   PHQ-9 Total Score MyChart 4 (Minimal depression)   PHQ-9 Total Score 4     NORBERT-7 SCORE 7/5/2019   Total Score 0 (minimal anxiety)   Total Score 0           Passed - Recent (12 mo) or future (30 days) visit within the authorizing provider's specialty     Patient has had an office visit with the authorizing provider or a provider within the authorizing providers department within the previous 12 mos or has a future within next 30 days. See \"Patient Info\" tab in inbasket, or \"Choose Columns\" in Meds & Orders section of the refill encounter.            Passed - Medication is active on med list        Passed - Patient is age 18 or older        Passed - No active pregnancy on record        Passed - No positive pregnancy test in last 12 months         "

## 2019-10-13 RX ORDER — FLUOXETINE 10 MG/1
CAPSULE ORAL
Qty: 90 CAPSULE | Refills: 0 | Status: SHIPPED | OUTPATIENT
Start: 2019-10-13 | End: 2020-01-10

## 2019-10-14 NOTE — TELEPHONE ENCOUNTER
Prescription approved per [facility] Refill Protocol.  Adamaris Jackson RN

## 2019-10-15 DIAGNOSIS — I10 HYPERTENSION, UNSPECIFIED TYPE: ICD-10-CM

## 2019-10-15 RX ORDER — METOPROLOL TARTRATE 25 MG/1
TABLET, FILM COATED ORAL
Qty: 180 TABLET | Refills: 3 | Status: SHIPPED | OUTPATIENT
Start: 2019-10-15 | End: 2020-07-16

## 2019-10-15 NOTE — TELEPHONE ENCOUNTER
"Requested Prescriptions   Pending Prescriptions Disp Refills     metoprolol tartrate (LOPRESSOR) 25 MG tablet  Last Written Prescription Date:  8-7-19  Last Fill Quantity: 60 tab,  # refills: 1   Last office visit: 10/8/2019 with prescribing provider:  Marita Freitas   Future Office Visit:   60 tablet 1       Beta-Blockers Protocol Passed - 10/15/2019  2:58 PM        Passed - Blood pressure under 140/90 in past 12 months     BP Readings from Last 3 Encounters:   10/08/19 100/64   07/19/19 112/68   07/11/19 155/74                 Passed - Patient is age 6 or older        Passed - Recent (12 mo) or future (30 days) visit within the authorizing provider's specialty     Patient has had an office visit with the authorizing provider or a provider within the authorizing providers department within the previous 12 mos or has a future within next 30 days. See \"Patient Info\" tab in inbasket, or \"Choose Columns\" in Meds & Orders section of the refill encounter.              Passed - Medication is active on med list         "

## 2019-10-16 NOTE — TELEPHONE ENCOUNTER
Signed Prescriptions:                        Disp   Refills    metoprolol tartrate (LOPRESSOR) 25 MG tabl*180 ta*3        Sig: TAKE 1 TABLET(25 MG) BY MOUTH TWICE DAILY  Authorizing Provider: ELBERT MOY  Ordering User: MACO DOVER

## 2019-11-11 ENCOUNTER — TRANSFERRED RECORDS (OUTPATIENT)
Dept: HEALTH INFORMATION MANAGEMENT | Facility: CLINIC | Age: 59
End: 2019-11-11

## 2019-11-11 LAB — TSH SERPL-ACNC: 1.55 UIU/ML (ref 0.45–4.5)

## 2019-11-13 ENCOUNTER — TRANSFERRED RECORDS (OUTPATIENT)
Dept: HEALTH INFORMATION MANAGEMENT | Facility: CLINIC | Age: 59
End: 2019-11-13

## 2019-11-15 ENCOUNTER — TRANSFERRED RECORDS (OUTPATIENT)
Dept: HEALTH INFORMATION MANAGEMENT | Facility: CLINIC | Age: 59
End: 2019-11-15

## 2019-11-19 ENCOUNTER — TRANSFERRED RECORDS (OUTPATIENT)
Dept: HEALTH INFORMATION MANAGEMENT | Facility: CLINIC | Age: 59
End: 2019-11-19

## 2019-11-19 ENCOUNTER — ANCILLARY PROCEDURE (OUTPATIENT)
Dept: GENERAL RADIOLOGY | Facility: CLINIC | Age: 59
End: 2019-11-19
Attending: INTERNAL MEDICINE
Payer: COMMERCIAL

## 2019-11-19 DIAGNOSIS — R09.A2 GLOBUS SENSATION: ICD-10-CM

## 2019-11-19 DIAGNOSIS — Z80.0 FH: COLON CANCER IN FIRST DEGREE RELATIVE <60 YEARS OLD: ICD-10-CM

## 2019-11-19 DIAGNOSIS — R19.7 DIARRHEA, UNSPECIFIED TYPE: ICD-10-CM

## 2019-11-26 NOTE — RESULT ENCOUNTER NOTE
"Angela Power,    This result of your esophogram does show \"minimal esophageal dysmotility (reduced movement) in the distal esopagus (lower esophagus).\" I would ask that you forward this result to your gastroenterologist at [facility] for their  Recommendations.    Marita CHEUNG CNP  "

## 2019-11-28 ENCOUNTER — MYC MEDICAL ADVICE (OUTPATIENT)
Dept: FAMILY MEDICINE | Facility: CLINIC | Age: 59
End: 2019-11-28

## 2019-11-28 DIAGNOSIS — G25.81 RESTLESS LEGS SYNDROME (RLS): Primary | ICD-10-CM

## 2019-12-02 PROBLEM — G25.81 RESTLESS LEGS SYNDROME (RLS): Status: ACTIVE | Noted: 2019-12-02

## 2019-12-04 DIAGNOSIS — G25.81 RESTLESS LEGS SYNDROME (RLS): ICD-10-CM

## 2019-12-04 LAB
BASOPHILS # BLD AUTO: 0 10E9/L (ref 0–0.2)
BASOPHILS NFR BLD AUTO: 0.7 %
DIFFERENTIAL METHOD BLD: NORMAL
EOSINOPHIL # BLD AUTO: 0.3 10E9/L (ref 0–0.7)
EOSINOPHIL NFR BLD AUTO: 5.6 %
ERYTHROCYTE [DISTWIDTH] IN BLOOD BY AUTOMATED COUNT: 12.9 % (ref 10–15)
FERRITIN SERPL-MCNC: 127 NG/ML (ref 8–252)
HCT VFR BLD AUTO: 40.6 % (ref 35–47)
HGB BLD-MCNC: 12.9 G/DL (ref 11.7–15.7)
IRON SATN MFR SERPL: 25 % (ref 15–46)
IRON SERPL-MCNC: 70 UG/DL (ref 35–180)
LYMPHOCYTES # BLD AUTO: 1.7 10E9/L (ref 0.8–5.3)
LYMPHOCYTES NFR BLD AUTO: 29.6 %
MCH RBC QN AUTO: 30.4 PG (ref 26.5–33)
MCHC RBC AUTO-ENTMCNC: 31.8 G/DL (ref 31.5–36.5)
MCV RBC AUTO: 96 FL (ref 78–100)
MONOCYTES # BLD AUTO: 0.8 10E9/L (ref 0–1.3)
MONOCYTES NFR BLD AUTO: 14.7 %
NEUTROPHILS # BLD AUTO: 2.8 10E9/L (ref 1.6–8.3)
NEUTROPHILS NFR BLD AUTO: 49.4 %
PLATELET # BLD AUTO: 230 10E9/L (ref 150–450)
RBC # BLD AUTO: 4.24 10E12/L (ref 3.8–5.2)
TIBC SERPL-MCNC: 278 UG/DL (ref 240–430)
WBC # BLD AUTO: 5.6 10E9/L (ref 4–11)

## 2019-12-04 PROCEDURE — 36415 COLL VENOUS BLD VENIPUNCTURE: CPT | Performed by: NURSE PRACTITIONER

## 2019-12-04 PROCEDURE — 83550 IRON BINDING TEST: CPT | Performed by: NURSE PRACTITIONER

## 2019-12-04 PROCEDURE — 83540 ASSAY OF IRON: CPT | Performed by: NURSE PRACTITIONER

## 2019-12-04 PROCEDURE — 82728 ASSAY OF FERRITIN: CPT | Performed by: NURSE PRACTITIONER

## 2019-12-04 PROCEDURE — 85025 COMPLETE CBC W/AUTO DIFF WBC: CPT | Performed by: NURSE PRACTITIONER

## 2019-12-04 NOTE — RESULT ENCOUNTER NOTE
Angela Power,    This is to let you know that the results of your recent blood tests are all normal.     Marita CHEUNG CNP

## 2019-12-10 ENCOUNTER — TRANSFERRED RECORDS (OUTPATIENT)
Dept: HEALTH INFORMATION MANAGEMENT | Facility: CLINIC | Age: 59
End: 2019-12-10

## 2019-12-16 ENCOUNTER — TRANSFERRED RECORDS (OUTPATIENT)
Dept: HEALTH INFORMATION MANAGEMENT | Facility: CLINIC | Age: 59
End: 2019-12-16

## 2019-12-17 ENCOUNTER — TRANSFERRED RECORDS (OUTPATIENT)
Dept: HEALTH INFORMATION MANAGEMENT | Facility: CLINIC | Age: 59
End: 2019-12-17

## 2020-01-09 DIAGNOSIS — F41.8 SITUATIONAL ANXIETY: ICD-10-CM

## 2020-01-10 RX ORDER — FLUOXETINE 10 MG/1
CAPSULE ORAL
Qty: 90 CAPSULE | Refills: 0 | Status: SHIPPED | OUTPATIENT
Start: 2020-01-10 | End: 2020-04-15

## 2020-01-10 NOTE — TELEPHONE ENCOUNTER
LOV: 10/8/2019    Prescription approved per [facility] Refill Protocol.  Thanks! Radha Baig RN

## 2020-04-14 DIAGNOSIS — F41.8 SITUATIONAL ANXIETY: ICD-10-CM

## 2020-04-14 NOTE — TELEPHONE ENCOUNTER
"Requested Prescriptions   Pending Prescriptions Disp Refills     FLUoxetine (PROZAC) 10 MG capsule [Pharmacy Med Name: FLUOXETINE 10MG CAPSULES] 90 capsule 0     Sig: TAKE 1 CAPSULE BY MOUTH EVERY DAY.  Last Written Prescription Date:  1/10/2020  Last Fill Quantity: 90 capsule,  # refills: 0   Last Office Visit: 10/8/2019   Future Office Visit:            SSRIs Protocol Passed - 4/14/2020  3:38 AM        Passed - Recent (12 mo) or future (30 days) visit within the authorizing provider's specialty     Patient has had an office visit with the authorizing provider or a provider within the authorizing providers department within the previous 12 mos or has a future within next 30 days. See \"Patient Info\" tab in inbasket, or \"Choose Columns\" in Meds & Orders section of the refill encounter.            Passed - Medication is active on med list        Passed - Patient is age 18 or older        Passed - No active pregnancy on record        Passed - No positive pregnancy test in last 12 months             "

## 2020-04-15 RX ORDER — FLUOXETINE 10 MG/1
CAPSULE ORAL
Qty: 0.1 CAPSULE | Refills: 0 | OUTPATIENT
Start: 2020-04-15

## 2020-07-11 DIAGNOSIS — F41.8 SITUATIONAL ANXIETY: ICD-10-CM

## 2020-07-12 NOTE — TELEPHONE ENCOUNTER
Spoke to pt. Scheduled video visit on Thursday, 07/16/20 at 11am.    Routing to nurse team due to pending medication.    Tracee DELEON  [facility]

## 2020-07-13 RX ORDER — FLUOXETINE 10 MG/1
CAPSULE ORAL
Qty: 90 CAPSULE | Refills: 0 | Status: SHIPPED | OUTPATIENT
Start: 2020-07-13 | End: 2020-07-16

## 2020-07-16 ENCOUNTER — VIRTUAL VISIT (OUTPATIENT)
Dept: FAMILY MEDICINE | Facility: CLINIC | Age: 60
End: 2020-07-16
Payer: COMMERCIAL

## 2020-07-16 DIAGNOSIS — R06.02 SHORTNESS OF BREATH: ICD-10-CM

## 2020-07-16 DIAGNOSIS — M35.00 SJOGREN'S SYNDROME, WITH UNSPECIFIED ORGAN INVOLVEMENT (H): ICD-10-CM

## 2020-07-16 DIAGNOSIS — F41.8 SITUATIONAL ANXIETY: Primary | ICD-10-CM

## 2020-07-16 DIAGNOSIS — K22.70 BARRETT'S ESOPHAGUS WITHOUT DYSPLASIA: ICD-10-CM

## 2020-07-16 DIAGNOSIS — I10 HYPERTENSION, UNSPECIFIED TYPE: ICD-10-CM

## 2020-07-16 PROCEDURE — 99214 OFFICE O/P EST MOD 30 MIN: CPT | Mod: 95 | Performed by: NURSE PRACTITIONER

## 2020-07-16 RX ORDER — OMEPRAZOLE 40 MG/1
40 CAPSULE, DELAYED RELEASE ORAL DAILY
Qty: 90 CAPSULE | Refills: 3 | Status: SHIPPED | OUTPATIENT
Start: 2020-07-16 | End: 2022-01-19

## 2020-07-16 RX ORDER — FLUOXETINE 10 MG/1
CAPSULE ORAL
Qty: 90 CAPSULE | Refills: 3 | Status: SHIPPED | OUTPATIENT
Start: 2020-07-16 | End: 2021-07-26

## 2020-07-16 RX ORDER — LORAZEPAM 0.5 MG/1
0.5 TABLET ORAL DAILY PRN
Qty: 10 TABLET | Refills: 3 | Status: SHIPPED | OUTPATIENT
Start: 2020-07-16 | End: 2021-09-01

## 2020-07-16 RX ORDER — METOPROLOL TARTRATE 25 MG/1
TABLET, FILM COATED ORAL
Qty: 180 TABLET | Refills: 3 | Status: SHIPPED | OUTPATIENT
Start: 2020-07-16 | End: 2021-07-26

## 2020-07-16 NOTE — PROGRESS NOTES
"Sejal Devlin is a 59 year old female who is being evaluated via a billable video visit.      The patient has been notified of following:     \"This video visit will be conducted via a call between you and your physician/provider. We have found that certain health care needs can be provided without the need for an in-person physical exam.  This service lets us provide the care you need with a video conversation.  If a prescription is necessary we can send it directly to your pharmacy.  If lab work is needed we can place an order for that and you can then stop by our lab to have the test done at a later time.    Video visits are billed at different rates depending on your insurance coverage.  Please reach out to your insurance provider with any questions.    If during the course of the call the physician/provider feels a video visit is not appropriate, you will not be charged for this service.\"    Patient has given verbal consent for Video visit? Yes  How would you like to obtain your AVS? MyChart  If you are dropped from the video visit, the video invite should be resent to: Other e-mail: [email or web address]  Will anyone else be joining your video visit? No    Subjective     Sejal Devlin is a 59 year old female who presents today via video visit for the following health issues:    HPI  Chief Complaint   Patient presents with     Refill Request     Fluoxetine:  For anxiety.  It helps but she does not think anxiety but she is willing to continue the fluoxetine.    Breathlessness:  Jannet has a complex history of bouts of breathlessness.  She had a comprehensive work up by the pulmonologist.  She was told that her symptoms were because of anxiety.  She has been taking her fluoxetine.  She ha shad some times when her breathlessness ramps up to a 10, but other times it is a 4-5.  The flare ups will last up to 2 weeks.  \"when I am in a flare I will     Lorazepam:  Intermittent, rare.    Blood pressure:  On metoprolol twice a day.  She " tolerates medication well.  She is requesting refills today.    Video Start Time: 11:04 AM        Patient Active Problem List   Diagnosis     Osteopenia     Hyperlipidemia LDL goal <160     Family history of colon cancer     Rash     Circulation problem     Dry mouth     Vitamin D deficiency     Sjogren's syndrome (H)     Leo esophagus     Hyperplastic colon polyp     Vaginal dryness     Shortness of breath     Restless legs syndrome (RLS)     Past Surgical History:   Procedure Laterality Date     APPENDECTOMY  1977     COLONOSCOPY  2013     COLONOSCOPY Left 2019    Procedure: COMBINED COLONOSCOPY, SINGLE OR MULTIPLE BIOPSY/POLYPECTOMY BY BIOPSY;  Surgeon: Olesya Phipps MD;  [facility]     ESOPHAGOSCOPY, GASTROSCOPY, DUODENOSCOPY (EGD), COMBINED N/A 2018    Procedure: COMBINED ESOPHAGOSCOPY, GASTROSCOPY, DUODENOSCOPY (EGD);  egd;  Surgeon: Poonam Alatorre MD;  [facility]     ESOPHAGOSCOPY, GASTROSCOPY, DUODENOSCOPY (EGD), COMBINED N/A 2018    Procedure: COMBINED ESOPHAGOSCOPY, GASTROSCOPY, DUODENOSCOPY (EGD), BIOPSY SINGLE OR MULTIPLE;;  Surgeon: Poonam Alatorre MD;  [facility]     GYN SURGERY           HC REMOVAL GALLBLADDER      Cholecystectomy     ORTHOPEDIC SURGERY  2016    Broken right radius     SURGICAL HISTORY OF -       c- section       Social History     Tobacco Use     Smoking status: Former Smoker     Packs/day: 1.00     Years: 10.00     Pack years: 10.00     Types: Cigarettes     Start date: 2002     Last attempt to quit: 2008     Years since quittin.5     Smokeless tobacco: Never Used     Tobacco comment: smoked age [age], quit x 1 yr and resumed then quit after one yr   Substance Use Topics     Alcohol use: Yes     Comment: 1-2 per day     Family History   Problem Relation Age of Onset     C.A.D. Mother         50s     Cancer - colorectal Mother          age 64, diagnosed age 54     Circulatory Mother      Coronary  Artery Disease Mother      Colon Cancer Mother      Hypertension Mother      C.A.D. Father      Eye Disorder Father         glaucoma     Lipids Father      Cancer Father         kidney     Hyperlipidemia Father      Substance Abuse Brother      Cardiovascular Brother         MI, stent at 52     Connective Tissue Disorder Other         cousin on father side has lupus     Coronary Artery Disease Brother      Coronary Artery Disease Brother      Substance Abuse Brother          Current Outpatient Medications   Medication Sig Dispense Refill     FLUoxetine (PROZAC) 10 MG capsule TAKE 1 CAPSULE BY MOUTH DAILY 90 capsule 0     LORazepam (ATIVAN) 0.5 MG tablet Take 1 tablet (0.5 mg) by mouth every 8 hours as needed for anxiety 10 tablet 1     metoprolol tartrate (LOPRESSOR) 25 MG tablet TAKE 1 TABLET(25 MG) BY MOUTH TWICE DAILY 180 tablet 3     omeprazole (PRILOSEC) 40 MG DR capsule Take 1 capsule (40 mg) by mouth daily 90 capsule 3     VITAMIN D, CHOLECALCIFEROL, PO Take by mouth daily       GNP GARLIC EXTRACT PO        Allergies   Allergen Reactions     No Known Drug Allergies      Seasonal Allergies      Recent Labs   Lab Test 11/11/19 11/15/18  0810 10/17/18  1123 10/12/17  1530 09/26/16  1627 08/29/14  1706 03/05/14  1619 10/21/13  1428  05/22/12  0839   A1C  --   --   --   --   --   --  5.7  --   --   --    LDL  --  157*  --   --  115*  --   --   --   --  147*   HDL  --  54  --   --  47*  --   --   --   --  49*   TRIG  --  106  --   --  237*  --   --   --   --  151*   ALT  --   --  20 23  --  22 25 29   < >  --    CR  --   --  0.82 0.85  --  0.77 0.85 0.73   < >  --    GFRESTIMATED  --   --  72 69  --  77 70 83   < >  --    GFRESTBLACK  --   --  87 83  --  >90   GFR Calc   84 >90   < >  --    POTASSIUM  --   --   --   --   --   --  4.0 5.3   < >  --    TSH 1.550  --   --   --   --   --   --  1.24   < >  --     < > = values in this interval not displayed.      BP Readings from Last 3 Encounters:    10/08/19 100/64   07/19/19 112/68   07/11/19 155/74    Wt Readings from Last 3 Encounters:   10/08/19 68 kg (150 lb)   07/11/19 69.7 kg (153 lb 12 oz)   07/05/19 68.9 kg (152 lb)           Reviewed and updated as needed this visit by Provider         Review of Systems   Constitutional, HEENT, cardiovascular, pulmonary, GI, , musculoskeletal, neuro, skin, endocrine and psych systems are negative, except as otherwise noted.      Objective             Physical Exam     GENERAL: Healthy, alert and no distress  EYES: Eyes grossly normal to inspection.  No discharge or erythema, or obvious scleral/conjunctival abnormalities.  RESP: No audible wheeze, cough, or visible cyanosis.  No visible retractions or increased work of breathing.    SKIN: Visible skin clear. No significant rash, abnormal pigmentation or lesions.  NEURO: Cranial nerves grossly intact.  Mentation and speech appropriate for age.  PSYCH: Mentation appears normal, affect normal/bright, judgement and insight intact, normal speech and appearance well-groomed.      Diagnostic Test Results:  Labs reviewed in Epic        Assessment & Plan     (F41.8) Situational anxiety  (primary encounter diagnosis)  Comment:   Plan: FLUoxetine (PROZAC) 10 MG capsule, LORazepam         (ATIVAN) 0.5 MG tablet        She is to continue her fluoxetine every day.  I did go ahead and refill lorazepam for intermittent use.  I did discuss with her and encouraged her to reduce lorazepam and use as little as possible.  She agrees understands.  Next clinic appointment for refills in 1 year, sooner if she needs lorazepam earlier.    (I10) Hypertension, unspecified type  Comment:   Plan: metoprolol tartrate (LOPRESSOR) 25 MG tablet        For now, I went ahead and refilled her metoprolol.  She is continue her medication as prescribed.  Healthy diet encouraged.  Follow-up with me for additional refills, return to clinic anytime if problems.    (M35.00) Sjogren's syndrome, with  "unspecified organ involvement (H)  Comment: History of  Plan:     (K22.70) Leo's esophagus without dysplasia  Comment: History of  Plan: omeprazole (PRILOSEC) 40 MG DR capsule        Continue omeprazole.    (R06.02) Shortness of breath  Comment: Chronic/intermittent  Plan: PULMONARY MEDICINE REFERRAL        Roberto reports that she continues to have these intermittent bouts of shortness of breath.  When the bouts hit, they last for several weeks.  She is previously seen pulmonology and she was not sure what she should do for follow-up with them.  I did suggest that she follow-up with them since it has been more than a year since she has seen a specialist.  She agrees and understands and follow-up with pulmonology.       BMI:   Estimated body mass index is 26.57 kg/m  as calculated from the following:    Height as of 10/8/19: 1.6 m (5' 3\").    Weight as of 10/8/19: 68 kg (150 lb).     See Patient Instructions    No follow-ups on file.    JONATAN Peraza CNP  [facility]      Video-Visit Details    Type of service:  Video Visit    Video End Time:11:16am    Originating Location (pt. Location): Home    Distant Location (provider location):  [facility]     Platform used for Video Visit: [employer]    No follow-ups on file.       JONATAN Peraza CNP        "

## 2020-10-13 ENCOUNTER — TELEPHONE (OUTPATIENT)
Dept: FAMILY MEDICINE | Facility: CLINIC | Age: 60
End: 2020-10-13

## 2020-10-13 NOTE — TELEPHONE ENCOUNTER
"PA started for metoprolol tartrate (LOPRESSOR) 25 MG tablet.  To submit the PA:  1. Go to [email or web address] and click Enter a key  2. Enter the pt's last name and date of birth and the shaw   Patient last name:Devlin   :[birth year]   Shaw:[id number]  3. Complete the form and click \"Send to Plan\"    "

## 2020-10-13 NOTE — TELEPHONE ENCOUNTER
"PA started for FLUoxetine (PROZAC) 10 MG capsule.  To submit the PA:  1. Go to [email or web address] and click Enter a key  2. Enter the pt's last name and date of birth and the shaw   Patient last name:Claus   :[birth year]   Shaw:[id number]  3. Complete the form and click \"Send to Plan\"    "

## 2020-10-13 NOTE — TELEPHONE ENCOUNTER
Pt has worker's comp insurance and pa team does not handle pa for worker's comp. Informed the pharmacy staff that they need to contact insurance corvel to resolve this issue.

## 2021-01-15 ENCOUNTER — HEALTH MAINTENANCE LETTER (OUTPATIENT)
Age: 61
End: 2021-01-15

## 2021-03-15 ENCOUNTER — MYC MEDICAL ADVICE (OUTPATIENT)
Dept: FAMILY MEDICINE | Facility: CLINIC | Age: 61
End: 2021-03-15

## 2021-04-30 ENCOUNTER — OFFICE VISIT (OUTPATIENT)
Dept: URGENT CARE | Facility: URGENT CARE | Age: 61
End: 2021-04-30
Payer: COMMERCIAL

## 2021-04-30 ENCOUNTER — ANCILLARY PROCEDURE (OUTPATIENT)
Dept: GENERAL RADIOLOGY | Facility: CLINIC | Age: 61
End: 2021-04-30
Attending: FAMILY MEDICINE
Payer: COMMERCIAL

## 2021-04-30 ENCOUNTER — MYC MEDICAL ADVICE (OUTPATIENT)
Dept: FAMILY MEDICINE | Facility: CLINIC | Age: 61
End: 2021-04-30

## 2021-04-30 VITALS
WEIGHT: 150 LBS | RESPIRATION RATE: 14 BRPM | TEMPERATURE: 98.1 F | SYSTOLIC BLOOD PRESSURE: 128 MMHG | HEIGHT: 63 IN | BODY MASS INDEX: 26.58 KG/M2 | DIASTOLIC BLOOD PRESSURE: 68 MMHG | OXYGEN SATURATION: 98 % | HEART RATE: 62 BPM

## 2021-04-30 DIAGNOSIS — S69.91XA WRIST INJURY, RIGHT, INITIAL ENCOUNTER: ICD-10-CM

## 2021-04-30 DIAGNOSIS — S69.91XA WRIST INJURY, RIGHT, INITIAL ENCOUNTER: Primary | ICD-10-CM

## 2021-04-30 PROCEDURE — 73110 X-RAY EXAM OF WRIST: CPT | Mod: RT | Performed by: RADIOLOGY

## 2021-04-30 PROCEDURE — 99213 OFFICE O/P EST LOW 20 MIN: CPT | Performed by: FAMILY MEDICINE

## 2021-04-30 ASSESSMENT — MIFFLIN-ST. JEOR: SCORE: 1214.53

## 2021-04-30 NOTE — PROGRESS NOTES
"SUBJECTIVE:  Sejal Devlin is a 60 year old female who presents for  injury:  Location: right wrist  Occured how long ago?: yesterday  Happened while: walking, tripped over uneven pavement and fell onto both hands.   Sore all over, but pain/discomfort felt most in the right wrist.   No elbow pain.  No numbness/tingling or weakness.  No open wounds or bleeding.    H/o previous fracture this wrist.   It did swell last night, but seems better now.  No pain or discomfort in the left hand/wrist/arm.  Right hand dominant.    EXAM:   /68   Pulse 62   Temp 98.1  F (36.7  C) (Oral)   Resp 14   Ht 1.6 m (5' 3\")   Wt 68 kg (150 lb)   LMP 12/18/2004   SpO2 98%   BMI 26.57 kg/m    General: healthy, alert and no distress  Involved injury area: right wrist  Description of injury site {EXAM INJURY: no tenderness to palpation.  A little pink over the radial side of the wrist, but no swelling currently.     Elbow exam benign.  Hand/finger exam benign.  Skin: Intact at site  Neurovascular status: There is not compromise to the distal circulation.    Range of motion of the affected area: Full Range of Motion with no pain.    X-RAY was done.  Surgical hardware noted, degenerative changes.  No clear fracture or dislocation noted.   Awaiting final radiology read and patient will be called if that differs.     ASSESSMENT/PLAN:     ICD-10-CM    1. Wrist injury, right, initial encounter  S69.91XA XR Wrist Right G/E 3 Views     No fracture noted, final read pending.   Wrist cock-up brace applied in office.    Patient Instructions   Wear the wrist brace for the next week, then if/as needed after that.   Ice, ibuprofen, tylenol or aleve for discomfort if desired.   Avoid aggravating activity until the symptoms improve.   Recheck with your primary provider in 2 weeks if symptoms persist.  See your provider sooner if any new or worsening symptoms develop.   Radiology will also review the slides.             "

## 2021-04-30 NOTE — PATIENT INSTRUCTIONS
Wear the wrist brace for the next week, then if/as needed after that.   Ice, ibuprofen, tylenol or aleve for discomfort if desired.   Avoid aggravating activity until the symptoms improve.   Recheck with your primary provider in 2 weeks if symptoms persist.  See your provider sooner if any new or worsening symptoms develop.   Radiology will also review the slides.

## 2021-05-25 ENCOUNTER — RECORDS - HEALTHEAST (OUTPATIENT)
Dept: ADMINISTRATIVE | Facility: CLINIC | Age: 61
End: 2021-05-25

## 2021-07-19 ENCOUNTER — MYC MEDICAL ADVICE (OUTPATIENT)
Dept: FAMILY MEDICINE | Facility: CLINIC | Age: 61
End: 2021-07-19

## 2021-07-21 DIAGNOSIS — I10 HYPERTENSION, UNSPECIFIED TYPE: ICD-10-CM

## 2021-07-21 DIAGNOSIS — F41.8 SITUATIONAL ANXIETY: ICD-10-CM

## 2021-07-26 RX ORDER — METOPROLOL TARTRATE 25 MG/1
TABLET, FILM COATED ORAL
Qty: 180 TABLET | Refills: 0 | Status: SHIPPED | OUTPATIENT
Start: 2021-07-26 | End: 2021-09-01

## 2021-07-26 RX ORDER — FLUOXETINE 10 MG/1
CAPSULE ORAL
Qty: 90 CAPSULE | Refills: 0 | Status: SHIPPED | OUTPATIENT
Start: 2021-07-26 | End: 2021-09-01

## 2021-07-26 NOTE — TELEPHONE ENCOUNTER
Team Coordinators-please contact patient to schedule office visit for annual physical and to establish care with another clinic provider.    90 day supplies refilled.    Thank you!  INGRID OrellanaN, RN  [facility]

## 2021-07-30 ENCOUNTER — MYC MEDICAL ADVICE (OUTPATIENT)
Dept: FAMILY MEDICINE | Facility: CLINIC | Age: 61
End: 2021-07-30

## 2021-07-30 DIAGNOSIS — Z12.31 VISIT FOR SCREENING MAMMOGRAM: Primary | ICD-10-CM

## 2021-08-04 ENCOUNTER — ANCILLARY PROCEDURE (OUTPATIENT)
Dept: MAMMOGRAPHY | Facility: CLINIC | Age: 61
End: 2021-08-04
Attending: NURSE PRACTITIONER
Payer: COMMERCIAL

## 2021-08-04 DIAGNOSIS — Z12.31 VISIT FOR SCREENING MAMMOGRAM: ICD-10-CM

## 2021-09-01 ENCOUNTER — OFFICE VISIT (OUTPATIENT)
Dept: FAMILY MEDICINE | Facility: CLINIC | Age: 61
End: 2021-09-01
Payer: COMMERCIAL

## 2021-09-01 VITALS
HEIGHT: 63 IN | BODY MASS INDEX: 26.84 KG/M2 | HEART RATE: 54 BPM | DIASTOLIC BLOOD PRESSURE: 62 MMHG | SYSTOLIC BLOOD PRESSURE: 120 MMHG | OXYGEN SATURATION: 98 % | WEIGHT: 151.5 LBS

## 2021-09-01 DIAGNOSIS — M35.00 SJOGREN'S SYNDROME, WITH UNSPECIFIED ORGAN INVOLVEMENT (H): ICD-10-CM

## 2021-09-01 DIAGNOSIS — Z13.0 SCREENING FOR DEFICIENCY ANEMIA: ICD-10-CM

## 2021-09-01 DIAGNOSIS — M85.89 OSTEOPENIA OF MULTIPLE SITES: Primary | ICD-10-CM

## 2021-09-01 DIAGNOSIS — R06.02 SHORTNESS OF BREATH: ICD-10-CM

## 2021-09-01 DIAGNOSIS — I10 ESSENTIAL HYPERTENSION: ICD-10-CM

## 2021-09-01 DIAGNOSIS — K22.70 BARRETT'S ESOPHAGUS WITHOUT DYSPLASIA: ICD-10-CM

## 2021-09-01 DIAGNOSIS — Z00.00 ROUTINE GENERAL MEDICAL EXAMINATION AT A HEALTH CARE FACILITY: ICD-10-CM

## 2021-09-01 DIAGNOSIS — E78.2 MIXED HYPERLIPIDEMIA: ICD-10-CM

## 2021-09-01 DIAGNOSIS — N93.0 PCB (POST COITAL BLEEDING): ICD-10-CM

## 2021-09-01 DIAGNOSIS — F41.9 ANXIETY: ICD-10-CM

## 2021-09-01 DIAGNOSIS — Z12.4 CERVICAL CANCER SCREENING: ICD-10-CM

## 2021-09-01 PROCEDURE — 90750 HZV VACC RECOMBINANT IM: CPT | Performed by: NURSE PRACTITIONER

## 2021-09-01 PROCEDURE — 90471 IMMUNIZATION ADMIN: CPT | Performed by: NURSE PRACTITIONER

## 2021-09-01 PROCEDURE — G0123 SCREEN CERV/VAG THIN LAYER: HCPCS | Performed by: NURSE PRACTITIONER

## 2021-09-01 PROCEDURE — 99214 OFFICE O/P EST MOD 30 MIN: CPT | Mod: 25 | Performed by: NURSE PRACTITIONER

## 2021-09-01 PROCEDURE — 99386 PREV VISIT NEW AGE 40-64: CPT | Mod: 25 | Performed by: NURSE PRACTITIONER

## 2021-09-01 PROCEDURE — 87624 HPV HI-RISK TYP POOLED RSLT: CPT | Performed by: NURSE PRACTITIONER

## 2021-09-01 RX ORDER — FLUOXETINE 10 MG/1
10 CAPSULE ORAL DAILY
Qty: 90 CAPSULE | Refills: 3 | Status: SHIPPED | OUTPATIENT
Start: 2021-09-01 | End: 2021-10-26

## 2021-09-01 RX ORDER — METOPROLOL TARTRATE 25 MG/1
TABLET, FILM COATED ORAL
Qty: 180 TABLET | Refills: 3 | Status: SHIPPED | OUTPATIENT
Start: 2021-09-01 | End: 2022-09-23

## 2021-09-01 ASSESSMENT — MIFFLIN-ST. JEOR: SCORE: 1220.29

## 2021-09-01 NOTE — PROGRESS NOTES
Assessment & Plan   1. Routine general medical examination at a health care facility  Future orders for fasting labs. Pap today. Up to date on mammogram and colonoscopy.  Shingrix today.   - ZOSTER VACCINE RECOMBINANT ADJUVANTED IM NJX    2. PCB (post coital bleeding)  Has noted vaginal bleeding with intercourse but no bleeding in between.  Likely due to post menopausal atrophic vaginal tissue. She is using a lubricant and declines a vaginal estrogen.  Will check pap today.  If bleeding occurs between episodes of intercourse or increases in amount did recommend pelvic ultrasound and she will monitor for this.   - Pap screen with HPV - recommended age 30 - 65 years  - HPV High Risk Types DNA Cervical    3. Osteopenia of multiple sites  Recheck DEXA, vitamin D  - DX Hip/Pelvis/Spine; Future  - Vitamin D Deficiency; Future    4. Anxiety  Well controlled with fluoxetine.  Desires to continue on this medication  - FLUoxetine (PROZAC) 10 MG capsule; Take 1 capsule (10 mg) by mouth daily  Dispense: 90 capsule; Refill: 3    5. Essential hypertension  Well controlled.    - metoprolol tartrate (LOPRESSOR) 25 MG tablet; TAKE 1 TABLET BY MOUTH TWICE DAILY  Dispense: 180 tablet; Refill: 3  - Comprehensive metabolic panel (BMP + Alb, Alk Phos, ALT, AST, Total. Bili, TP); Future    6. Cervical cancer screening  - Pap screen with HPV - recommended age 30 - 65 years  - HPV High Risk Types DNA Cervical    7. Sjogren's syndrome, with unspecified organ involvement (H)  asymptomatic    8. Shortness of breath  Reviewed previous workup. She feels this has been much less frequent and intense. Likely at least some association with anxiety    9. Leo's esophagus without dysplasia  Follows with [facility]    10. Mixed hyperlipidemia  - Lipid Profile (Chol, Trig, HDL, LDL calc); Future    11. Screening for deficiency anemia  - CBC with platelets; Future    Tory Celaya, CNP    Subjective   Chief Complaint:  Establish care    HPI:   Sejal  CIPRIANO Devlin is a 61 year old female who presents for establish care visit.     New patient. Previously seen at [facility].  PMH notable for HTN, chronic dyspnea, Sjogren's, Leo's Esophagus. She works in several part time roles as an  for a [employer].     Shortness of breath:  Present for many years. Has been worked up with both cardiology and pulmonology.  Normal PFTs, stress echo, CT angiogram. She states no 'bad' episodes recently.  She did note some correlation in improvement with starting fluoxetine.   Was experiencing anxiety and feels this has been helpful.  States primary source of anxiety was a work role that she has now left.     HTN:  Metoprolol, well controlled.     Sjogrens syndrome: diagnosed a few years ago. asymptomatic.      Leo's esophagus: follows with [facility]. omeprazole    IBS-D:  Follows with [facility].  Frequent urgent diarrhea. Takes Imodium one tablet daily.  Planning to modify diet as next step.     Vaginal bleeding: Has noted postcoital bleeding. This resolves within a couple of hours and no bleeding between episodes of intercourse.  Has noted intercourse is painful.  Using lubricant.  Has not tried vaginal estrogen and declines this.     Osteopenia:  Weight bearing exercise walking 2 miles a day.  Vitamin D, unsure of dose. Calcium in diet.     Allergies:  is allergic to no known drug allergies and seasonal allergies.    SH/FH:  Social History and Family History reviewed and updated.   Tobacco Status:  She  reports that she quit smoking about 13 years ago. Her smoking use included cigarettes. She started smoking about 19 years ago. She has a 10.00 pack-year smoking history. She has never used smokeless tobacco.    Review of Systems:  A complete head to toe ROS is negative unless otherwise noted in HPI    Objective     Vitals:    09/01/21 1101   BP: 120/62   BP Location: Left arm   Patient Position: Sitting   Cuff Size: Adult Small   Pulse: 54   SpO2: 98%   Weight: 68.7 kg  "(151 lb 8 oz)   Height: 1.607 m (5' 3.25\")       Physical Exam:  GENERAL: Alert, well-appearing   PSYCH: Pleasant mood, affect appropriate.    SKIN: No atypical lesions  HEAD: Normocephalic, atraumatic  EYES: Conjunctiva pink, sclera white, no exudates. BRONWYN.  EOMs intact.   EARS: TMs pearly grey, no bulging, redness, retraction.   MOUTH: Pharynx moist, pink without exudate. No tonsillar enlargement  NECK: No lymphadenopathy. Thyroid borders smooth without enlargement, nodules.   CV: Regular rate and rhythm without murmurs, rubs or gallops.  RESP: Lung sounds clear  ABDOMEN: BS+. Abdomen soft, nontender to palpation without guarding. No organomegaly, masses or hernias  PV : No edema  BREASTS: Breasts symmetric, no dimpling, masses or skin discolorations seen. Areolas and nipples symmetric without discharge. On palpation, breast tissue supple and nontender. No masses or nodules. Axillary and epitrochlear lymph nodes nonpalpable.    FEMALE: Vulvar and vaginal tissue thin and atrophic.  No loss of vulvar architecture.  Vaginal walls and cervix without lesions or masses. Pap smear obtained. Cervix is quite friable. On bimanual palpation, uterus mobile, normal shape and contour. No adnexal masses or tenderness.              "

## 2021-09-02 ENCOUNTER — DOCUMENTATION ONLY (OUTPATIENT)
Dept: LAB | Facility: CLINIC | Age: 61
End: 2021-09-02

## 2021-09-02 NOTE — PROGRESS NOTES
Patient has appointment on 9.8.21 for fasting labs, but no orders have been placed.  Please place future orders or advise patient.    Thanks,  [facility]

## 2021-09-08 ENCOUNTER — LAB (OUTPATIENT)
Dept: LAB | Facility: CLINIC | Age: 61
End: 2021-09-08
Payer: COMMERCIAL

## 2021-09-08 DIAGNOSIS — Z13.0 SCREENING FOR DEFICIENCY ANEMIA: ICD-10-CM

## 2021-09-08 DIAGNOSIS — I10 ESSENTIAL HYPERTENSION: ICD-10-CM

## 2021-09-08 DIAGNOSIS — E78.2 MIXED HYPERLIPIDEMIA: ICD-10-CM

## 2021-09-08 DIAGNOSIS — M85.89 OSTEOPENIA OF MULTIPLE SITES: ICD-10-CM

## 2021-09-08 LAB
ALBUMIN SERPL-MCNC: 3.8 G/DL (ref 3.5–5)
ALP SERPL-CCNC: 74 U/L (ref 45–120)
ALT SERPL W P-5'-P-CCNC: 13 U/L (ref 0–45)
ANION GAP SERPL CALCULATED.3IONS-SCNC: 10 MMOL/L (ref 5–18)
AST SERPL W P-5'-P-CCNC: 16 U/L (ref 0–40)
BILIRUB SERPL-MCNC: 0.5 MG/DL (ref 0–1)
BUN SERPL-MCNC: 16 MG/DL (ref 8–22)
CALCIUM SERPL-MCNC: 9.6 MG/DL (ref 8.5–10.5)
CHLORIDE BLD-SCNC: 105 MMOL/L (ref 98–107)
CHOLEST SERPL-MCNC: 253 MG/DL
CO2 SERPL-SCNC: 26 MMOL/L (ref 22–31)
CREAT SERPL-MCNC: 0.82 MG/DL (ref 0.6–1.1)
ERYTHROCYTE [DISTWIDTH] IN BLOOD BY AUTOMATED COUNT: 12.5 % (ref 10–15)
GFR SERPL CREATININE-BSD FRML MDRD: 77 ML/MIN/1.73M2
GLUCOSE BLD-MCNC: 95 MG/DL (ref 70–125)
HCT VFR BLD AUTO: 43.2 % (ref 35–47)
HDLC SERPL-MCNC: 54 MG/DL
HGB BLD-MCNC: 14 G/DL (ref 11.7–15.7)
HUMAN PAPILLOMA VIRUS 16 DNA: NEGATIVE
HUMAN PAPILLOMA VIRUS 18 DNA: NEGATIVE
HUMAN PAPILLOMA VIRUS FINAL DIAGNOSIS: NORMAL
HUMAN PAPILLOMA VIRUS OTHER HR: NEGATIVE
LDLC SERPL CALC-MCNC: 170 MG/DL
MCH RBC QN AUTO: 31.5 PG (ref 26.5–33)
MCHC RBC AUTO-ENTMCNC: 32.4 G/DL (ref 31.5–36.5)
MCV RBC AUTO: 97 FL (ref 78–100)
PLATELET # BLD AUTO: 237 10E3/UL (ref 150–450)
POTASSIUM BLD-SCNC: 4.3 MMOL/L (ref 3.5–5)
PROT SERPL-MCNC: 7.2 G/DL (ref 6–8)
RBC # BLD AUTO: 4.44 10E6/UL (ref 3.8–5.2)
SODIUM SERPL-SCNC: 141 MMOL/L (ref 136–145)
TRIGL SERPL-MCNC: 144 MG/DL
WBC # BLD AUTO: 5 10E3/UL (ref 4–11)

## 2021-09-08 PROCEDURE — 80061 LIPID PANEL: CPT | Performed by: NURSE PRACTITIONER

## 2021-09-08 PROCEDURE — 80053 COMPREHEN METABOLIC PANEL: CPT | Performed by: NURSE PRACTITIONER

## 2021-09-08 PROCEDURE — 85027 COMPLETE CBC AUTOMATED: CPT | Performed by: NURSE PRACTITIONER

## 2021-09-08 PROCEDURE — 82306 VITAMIN D 25 HYDROXY: CPT

## 2021-09-08 PROCEDURE — 36415 COLL VENOUS BLD VENIPUNCTURE: CPT | Performed by: NURSE PRACTITIONER

## 2021-09-09 LAB
BKR LAB AP GYN ADEQUACY: NORMAL
BKR LAB AP GYN INTERPRETATION: NORMAL
BKR LAB AP HPV REFLEX: NORMAL
BKR LAB AP PREVIOUS ABNORMAL: NORMAL
DEPRECATED CALCIDIOL+CALCIFEROL SERPL-MC: 31 UG/L (ref 30–80)
PATH REPORT.COMMENTS IMP SPEC: NORMAL
PATH REPORT.RELEVANT HX SPEC: NORMAL

## 2021-09-13 ENCOUNTER — ANCILLARY PROCEDURE (OUTPATIENT)
Dept: BONE DENSITY | Facility: CLINIC | Age: 61
End: 2021-09-13
Attending: NURSE PRACTITIONER
Payer: COMMERCIAL

## 2021-09-13 DIAGNOSIS — M85.89 OSTEOPENIA OF MULTIPLE SITES: ICD-10-CM

## 2021-09-13 PROCEDURE — 77080 DXA BONE DENSITY AXIAL: CPT | Performed by: INTERNAL MEDICINE

## 2021-09-14 DIAGNOSIS — M81.0 AGE-RELATED OSTEOPOROSIS WITHOUT CURRENT PATHOLOGICAL FRACTURE: Primary | ICD-10-CM

## 2021-10-24 ENCOUNTER — HEALTH MAINTENANCE LETTER (OUTPATIENT)
Age: 61
End: 2021-10-24

## 2021-10-25 DIAGNOSIS — F41.9 ANXIETY: ICD-10-CM

## 2021-10-26 RX ORDER — FLUOXETINE 10 MG/1
CAPSULE ORAL
Qty: 90 CAPSULE | Refills: 3 | Status: SHIPPED | OUTPATIENT
Start: 2021-10-26 | End: 2022-01-19

## 2021-11-13 ENCOUNTER — MYC MEDICAL ADVICE (OUTPATIENT)
Dept: FAMILY MEDICINE | Facility: CLINIC | Age: 61
End: 2021-11-13
Payer: COMMERCIAL

## 2021-12-07 ENCOUNTER — ALLIED HEALTH/NURSE VISIT (OUTPATIENT)
Dept: FAMILY MEDICINE | Facility: CLINIC | Age: 61
End: 2021-12-07
Payer: COMMERCIAL

## 2021-12-07 DIAGNOSIS — Z23 NEED FOR VACCINATION: Primary | ICD-10-CM

## 2021-12-07 PROCEDURE — 99207 PR NO CHARGE NURSE ONLY: CPT

## 2021-12-07 NOTE — PROGRESS NOTES
Jannet came in today for her 2nd shingel shot, see immunizations    Jacinta Ballard CMA ([facility])

## 2022-01-19 ENCOUNTER — VIRTUAL VISIT (OUTPATIENT)
Dept: FAMILY MEDICINE | Facility: CLINIC | Age: 62
End: 2022-01-19
Payer: COMMERCIAL

## 2022-01-19 DIAGNOSIS — F41.9 ANXIETY: ICD-10-CM

## 2022-01-19 PROBLEM — N89.8 VAGINAL DRYNESS: Status: RESOLVED | Noted: 2019-10-08 | Resolved: 2022-01-19

## 2022-01-19 PROCEDURE — 99207 PR NON-BILLABLE SERV PER CHARTING: CPT | Performed by: NURSE PRACTITIONER

## 2022-01-19 RX ORDER — FLUOXETINE 10 MG/1
10 CAPSULE ORAL DAILY
Qty: 90 CAPSULE | Refills: 3 | Status: SHIPPED | OUTPATIENT
Start: 2022-01-19 | End: 2023-01-11

## 2022-01-19 NOTE — PROGRESS NOTES
No charge - med refill visit not due.  Patient reports no changes to mental health, well controlled with fluoxetine.

## 2022-02-13 ENCOUNTER — HEALTH MAINTENANCE LETTER (OUTPATIENT)
Age: 62
End: 2022-02-13

## 2022-05-26 ENCOUNTER — OFFICE VISIT (OUTPATIENT)
Dept: FAMILY MEDICINE | Facility: CLINIC | Age: 62
End: 2022-05-26
Payer: COMMERCIAL

## 2022-05-26 VITALS
DIASTOLIC BLOOD PRESSURE: 60 MMHG | BODY MASS INDEX: 25.83 KG/M2 | HEART RATE: 62 BPM | OXYGEN SATURATION: 97 % | SYSTOLIC BLOOD PRESSURE: 110 MMHG | WEIGHT: 147 LBS

## 2022-05-26 DIAGNOSIS — F41.9 ACUTE ANXIETY: Primary | ICD-10-CM

## 2022-05-26 PROCEDURE — 99213 OFFICE O/P EST LOW 20 MIN: CPT | Performed by: NURSE PRACTITIONER

## 2022-05-26 RX ORDER — LORAZEPAM 1 MG/1
1 TABLET ORAL EVERY 6 HOURS PRN
Qty: 15 TABLET | Refills: 0 | Status: SHIPPED | OUTPATIENT
Start: 2022-05-26 | End: 2024-02-27

## 2022-05-26 ASSESSMENT — ANXIETY QUESTIONNAIRES
7. FEELING AFRAID AS IF SOMETHING AWFUL MIGHT HAPPEN: NOT AT ALL
6. BECOMING EASILY ANNOYED OR IRRITABLE: NOT AT ALL
5. BEING SO RESTLESS THAT IT IS HARD TO SIT STILL: NOT AT ALL
GAD7 TOTAL SCORE: 0
2. NOT BEING ABLE TO STOP OR CONTROL WORRYING: NOT AT ALL
1. FEELING NERVOUS, ANXIOUS, OR ON EDGE: NOT AT ALL
GAD7 TOTAL SCORE: 0
3. WORRYING TOO MUCH ABOUT DIFFERENT THINGS: NOT AT ALL

## 2022-05-26 ASSESSMENT — PATIENT HEALTH QUESTIONNAIRE - PHQ9
SUM OF ALL RESPONSES TO PHQ QUESTIONS 1-9: 1
5. POOR APPETITE OR OVEREATING: NOT AT ALL

## 2022-05-26 NOTE — PROGRESS NOTES
Assessment & Plan   1. Acute anxiety  Significant flight anxiety.  Has only flown once before and never taken a pre-medication.  Will prescribed lorazepam. Advised on possible side effects and risks.  Counseled not to combine with other sedating substances or drive while taking.    - LORazepam (ATIVAN) 1 MG tablet; Take 1 tablet (1 mg) by mouth every 6 hours as needed (flight anxiety)  Dispense: 15 tablet; Refill: 0    Tory Celaya CNP    Subjective     HPI:   Sejal Devlin is a 61 year old female who presents for anxiety.     She has a history of generalized anxiety and has been on fluoxetine 10mg. She will be traveling this summer to [place] and is concerned about flight anxiety.  She states she had avoided flying her whole life and then a couple of years ago her daughters convinced her to fly with them. Experienced panic symptoms during the full two hours on the plane. 'felt like I was hyperventilating'.  Interested in medication to help with this upcoming trip      Allergies:  is allergic to no known drug allergies and seasonal allergies.    SH/FH:  Social History and Family History reviewed and updated.   Tobacco Status:  She  reports that she quit smoking about 14 years ago. Her smoking use included cigarettes. She started smoking about 19 years ago. She has a 10.00 pack-year smoking history. She has never used smokeless tobacco.    Review of Systems:  A complete head to toe ROS is negative unless otherwise noted in HPI    Objective   There were no vitals filed for this visit.    Physical Exam:  GENERAL: Alert, well-appearing  PSYCH: Pleasant mood, affect appropriate.  Good judgment and insight.  Intact recent and remote memory.  Good eye contact.          Answers for HPI/ROS submitted by the patient on 5/23/2022  What is the reason for your visit today? : I am going to [place] on [address] and I need anti-anxiety meds for the flight.  How many servings of fruits and vegetables do you eat daily?:  2-3  On average, how many sweetened beverages do you drink each day (Examples: soda, juice, sweet tea, etc.  Do NOT count diet or artificially sweetened beverages)?: 1  How many minutes a day do you exercise enough to make your heart beat faster?: 30 to 60  How many days a week do you exercise enough to make your heart beat faster?: 7  How many days per week do you miss taking your medication?: 0

## 2022-05-26 NOTE — PROGRESS NOTES
Answers for HPI/ROS submitted by the patient on 5/23/2022  What is the reason for your visit today? : I am going to [place] on [address] and I need anti-anxiety meds for the flight.  How many servings of fruits and vegetables do you eat daily?: 2-3  On average, how many sweetened beverages do you drink each day (Examples: soda, juice, sweet tea, etc.  Do NOT count diet or artificially sweetened beverages)?: 1  How many minutes a day do you exercise enough to make your heart beat faster?: 30 to 60  How many days a week do you exercise enough to make your heart beat faster?: 7  How many days per week do you miss taking your medication?: 0

## 2022-06-27 ENCOUNTER — E-VISIT (OUTPATIENT)
Dept: URGENT CARE | Facility: CLINIC | Age: 62
End: 2022-06-27
Payer: COMMERCIAL

## 2022-06-27 DIAGNOSIS — N39.0 ACUTE UTI (URINARY TRACT INFECTION): Primary | ICD-10-CM

## 2022-06-27 PROCEDURE — 99421 OL DIG E/M SVC 5-10 MIN: CPT

## 2022-06-27 RX ORDER — NITROFURANTOIN 25; 75 MG/1; MG/1
100 CAPSULE ORAL 2 TIMES DAILY
Qty: 10 CAPSULE | Refills: 0 | Status: SHIPPED | OUTPATIENT
Start: 2022-06-27 | End: 2022-07-02

## 2022-06-27 NOTE — PATIENT INSTRUCTIONS
Dear Sejal Devlin    After reviewing your responses, I've been able to diagnose you with a urinary tract infection, which is a common infection of the bladder with bacteria.  This is not a sexually transmitted infection, though urinating immediately after intercourse can help prevent infections.  Drinking lots of fluids is also helpful to clear your current infection and prevent the next one.      I have sent a prescription for antibiotics to your pharmacy to treat this infection.    It is important that you take all of your prescribed medication even if your symptoms are improving after a few doses.  Taking all of your medicine helps prevent the symptoms from returning.     If your symptoms worsen, you develop pain in your back or stomach, develop fevers, or are not improving in 5 days, please contact your primary care provider for an appointment or visit any of our convenient Walk-in or Urgent Care Centers to be seen, which can be found on our website here.    Thanks again for choosing us as your health care partner,    Teresa Garner PA-C, [facility]    Urinary Tract Infections in Women  Urinary tract infections (UTIs) are most often caused by bacteria. These bacteria enter the urinary tract. The bacteria may come from inside the body. Or they may travel from the skin outside the rectum or vagina into the urethra. Female anatomy makes it easy for bacteria from the bowel to enter a woman s urinary tract, which is the most common source of UTI. This means women develop UTIs more often than men. Pain in or around the urinary tract is a common UTI symptom. But the only way to know for sure if you have a UTI for the healthcare provider to test your urine. The two tests that may be done are the urinalysis and urine culture.     Types of UTIs    Cystitis. A bladder infection (cystitis) is the most common UTI in women. You may have urgent or frequent need to pee. You may also have pain, burning when you pee, and bloody  urine.    Urethritis. This is an inflamed urethra, which is the tube that carries urine from the bladder to outside the body. You may have lower stomach or back pain. You may also have urgent or frequent need to pee.    Pyelonephritis. This is a kidney infection. If not treated, it can be serious and damage your kidneys. In severe cases, you may need to stay in the hospital. You may have a fever and lower back pain.    Medicines to treat a UTI  Most UTIs are treated with antibiotics. These kill the bacteria. The length of time you need to take them depends on the type of infection. It may be as short as 3 days. If you have repeated UTIs, you may need a low-dose antibiotic for several months. Take antibiotics exactly as directed. Don t stop taking them until all of the medicine is gone. If you stop taking the antibiotic too soon, the infection may not go away. You may also develop a resistance to the antibiotic. This can make it much harder to treat.   Lifestyle changes to treat and prevent UTIs   The lifestyle changes below will help get rid of your UTI. They may also help prevent future UTIs.     Drink plenty of fluids. This includes water, juice, or other caffeine-free drinks. Fluids help flush bacteria out of your body.    Empty your bladder. Always empty your bladder when you feel the urge to pee. And always pee before going to sleep. Urine that stays in your bladder can lead to infection. Try to pee before and after sex as well.    Practice good personal hygiene. Wipe yourself from front to back after using the toilet. This helps keep bacteria from getting into the urethra.    Use condoms during sex. These help prevent UTIs caused by sexually transmitted bacteria. Also don't use spermicides during sex. These can increase the risk for UTIs. Choose other forms of birth control instead. For women who tend to get UTIs after sex, a low-dose of a preventive antibiotic may be used. Be sure to discuss this option with  your healthcare provider.    Follow up with your healthcare provider as directed. He or she may test to make sure the infection has cleared. If needed, more treatment may be started.  Brendan last reviewed this educational content on 7/1/2019 2000-2021 [employer]. All rights reserved. This information is not intended as a substitute for professional medical care. Always follow your healthcare professional's instructions.

## 2022-10-15 ENCOUNTER — HEALTH MAINTENANCE LETTER (OUTPATIENT)
Age: 62
End: 2022-10-15

## 2022-12-30 DIAGNOSIS — I10 ESSENTIAL HYPERTENSION: ICD-10-CM

## 2022-12-31 NOTE — TELEPHONE ENCOUNTER
"Former patient of [facility] & has not established care with another provider.  Please assign refill request to covering provider per clinic standard process.    Routing refill request to provider for review/approval because:  No PCP    Last Written Prescription Date:  9/23/22  Last Fill Quantity: 180,  # refills: 0   Last office visit provider:  5/26/22     Requested Prescriptions   Pending Prescriptions Disp Refills     metoprolol tartrate (LOPRESSOR) 25 MG tablet [Pharmacy Med Name: METOPROLOL TARTRATE 25MG TABLETS] 180 tablet 0     Sig: TAKE 1 TABLET BY MOUTH TWICE DAILY       Beta-Blockers Protocol Passed - 12/31/2022 10:13 AM        Passed - Blood pressure under 140/90 in past 12 months     BP Readings from Last 3 Encounters:   05/26/22 110/60   09/01/21 120/62   04/30/21 128/68                 Passed - Patient is age 6 or older        Passed - Recent (12 mo) or future (30 days) visit within the authorizing provider's specialty     Patient has had an office visit with the authorizing provider or a provider within the authorizing providers department within the previous 12 mos or has a future within next 30 days. See \"Patient Info\" tab in inbasket, or \"Choose Columns\" in Meds & Orders section of the refill encounter.              Passed - Medication is active on med list             Doug Rock RN 12/31/22 10:13 AM  "

## 2023-01-04 NOTE — TELEPHONE ENCOUNTER
1/4/2023@10:17  Called PT; No answer; advised PT that she needs to establish new PCP before Rx can be refilled.

## 2023-01-11 ENCOUNTER — OFFICE VISIT (OUTPATIENT)
Dept: FAMILY MEDICINE | Facility: CLINIC | Age: 63
End: 2023-01-11
Payer: COMMERCIAL

## 2023-01-11 VITALS
RESPIRATION RATE: 16 BRPM | WEIGHT: 160 LBS | OXYGEN SATURATION: 99 % | HEIGHT: 63 IN | HEART RATE: 53 BPM | TEMPERATURE: 97.2 F | SYSTOLIC BLOOD PRESSURE: 122 MMHG | BODY MASS INDEX: 28.35 KG/M2 | DIASTOLIC BLOOD PRESSURE: 78 MMHG

## 2023-01-11 DIAGNOSIS — I10 BENIGN ESSENTIAL HYPERTENSION: Primary | ICD-10-CM

## 2023-01-11 DIAGNOSIS — H92.03 OTALGIA, BILATERAL: ICD-10-CM

## 2023-01-11 DIAGNOSIS — E78.5 HYPERLIPIDEMIA, UNSPECIFIED HYPERLIPIDEMIA TYPE: ICD-10-CM

## 2023-01-11 DIAGNOSIS — F41.9 ANXIETY: ICD-10-CM

## 2023-01-11 PROBLEM — K22.70 BARRETT ESOPHAGUS: Status: RESOLVED | Noted: 2018-05-03 | Resolved: 2023-01-11

## 2023-01-11 LAB
ANION GAP SERPL CALCULATED.3IONS-SCNC: 10 MMOL/L (ref 7–15)
BUN SERPL-MCNC: 15.6 MG/DL (ref 8–23)
CALCIUM SERPL-MCNC: 10.1 MG/DL (ref 8.8–10.2)
CHLORIDE SERPL-SCNC: 102 MMOL/L (ref 98–107)
CHOLEST SERPL-MCNC: 300 MG/DL
CREAT SERPL-MCNC: 0.79 MG/DL (ref 0.51–0.95)
DEPRECATED HCO3 PLAS-SCNC: 25 MMOL/L (ref 22–29)
GFR SERPL CREATININE-BSD FRML MDRD: 84 ML/MIN/1.73M2
GLUCOSE SERPL-MCNC: 101 MG/DL (ref 70–99)
HDLC SERPL-MCNC: 59 MG/DL
LDLC SERPL CALC-MCNC: 207 MG/DL
NONHDLC SERPL-MCNC: 241 MG/DL
POTASSIUM SERPL-SCNC: 5.1 MMOL/L (ref 3.4–5.3)
SODIUM SERPL-SCNC: 137 MMOL/L (ref 136–145)
TRIGL SERPL-MCNC: 172 MG/DL

## 2023-01-11 PROCEDURE — 36415 COLL VENOUS BLD VENIPUNCTURE: CPT | Performed by: NURSE PRACTITIONER

## 2023-01-11 PROCEDURE — 80061 LIPID PANEL: CPT | Performed by: NURSE PRACTITIONER

## 2023-01-11 PROCEDURE — 90471 IMMUNIZATION ADMIN: CPT | Performed by: NURSE PRACTITIONER

## 2023-01-11 PROCEDURE — 90715 TDAP VACCINE 7 YRS/> IM: CPT | Performed by: NURSE PRACTITIONER

## 2023-01-11 PROCEDURE — 96127 BRIEF EMOTIONAL/BEHAV ASSMT: CPT | Performed by: NURSE PRACTITIONER

## 2023-01-11 PROCEDURE — 99214 OFFICE O/P EST MOD 30 MIN: CPT | Mod: 25 | Performed by: NURSE PRACTITIONER

## 2023-01-11 PROCEDURE — 80048 BASIC METABOLIC PNL TOTAL CA: CPT | Performed by: NURSE PRACTITIONER

## 2023-01-11 RX ORDER — FLUOXETINE 10 MG/1
10 CAPSULE ORAL DAILY
Qty: 90 CAPSULE | Refills: 3 | Status: SHIPPED | OUTPATIENT
Start: 2023-01-11 | End: 2023-10-23

## 2023-01-11 RX ORDER — METOPROLOL TARTRATE 25 MG/1
25 TABLET, FILM COATED ORAL 2 TIMES DAILY
Qty: 180 TABLET | Refills: 3 | Status: SHIPPED | OUTPATIENT
Start: 2023-01-11 | End: 2023-10-23

## 2023-01-11 ASSESSMENT — ANXIETY QUESTIONNAIRES
4. TROUBLE RELAXING: NOT AT ALL
6. BECOMING EASILY ANNOYED OR IRRITABLE: NOT AT ALL
IF YOU CHECKED OFF ANY PROBLEMS ON THIS QUESTIONNAIRE, HOW DIFFICULT HAVE THESE PROBLEMS MADE IT FOR YOU TO DO YOUR WORK, TAKE CARE OF THINGS AT HOME, OR GET ALONG WITH OTHER PEOPLE: NOT DIFFICULT AT ALL
GAD7 TOTAL SCORE: 2
3. WORRYING TOO MUCH ABOUT DIFFERENT THINGS: SEVERAL DAYS
7. FEELING AFRAID AS IF SOMETHING AWFUL MIGHT HAPPEN: NOT AT ALL
GAD7 TOTAL SCORE: 2
1. FEELING NERVOUS, ANXIOUS, OR ON EDGE: NOT AT ALL
2. NOT BEING ABLE TO STOP OR CONTROL WORRYING: SEVERAL DAYS
GAD7 TOTAL SCORE: 2
7. FEELING AFRAID AS IF SOMETHING AWFUL MIGHT HAPPEN: NOT AT ALL
5. BEING SO RESTLESS THAT IT IS HARD TO SIT STILL: NOT AT ALL
8. IF YOU CHECKED OFF ANY PROBLEMS, HOW DIFFICULT HAVE THESE MADE IT FOR YOU TO DO YOUR WORK, TAKE CARE OF THINGS AT HOME, OR GET ALONG WITH OTHER PEOPLE?: NOT DIFFICULT AT ALL

## 2023-01-11 ASSESSMENT — ENCOUNTER SYMPTOMS: NERVOUS/ANXIOUS: 1

## 2023-01-11 ASSESSMENT — PATIENT HEALTH QUESTIONNAIRE - PHQ9
10. IF YOU CHECKED OFF ANY PROBLEMS, HOW DIFFICULT HAVE THESE PROBLEMS MADE IT FOR YOU TO DO YOUR WORK, TAKE CARE OF THINGS AT HOME, OR GET ALONG WITH OTHER PEOPLE: SOMEWHAT DIFFICULT
SUM OF ALL RESPONSES TO PHQ QUESTIONS 1-9: 1
SUM OF ALL RESPONSES TO PHQ QUESTIONS 1-9: 1

## 2023-01-11 ASSESSMENT — PAIN SCALES - GENERAL: PAINLEVEL: NO PAIN (0)

## 2023-01-11 NOTE — PROGRESS NOTES
"  Assessment & Plan     (I10) Benign essential hypertension  (primary encounter diagnosis)  Comment: at goal  Plan: Basic metabolic panel  (Ca, Cl, CO2, Creat,         Gluc, K, Na, BUN) metoprolol tartrate (LOPRESSOR) 25 MG tablet        The current medical regimen is effective;  continue present plan and medications.     (E78.5) Hyperlipidemia, unspecified hyperlipidemia type  Comment:   Plan: Lipid panel reflex to direct LDL Non-fasting        Discussed current [place] score of 6.2%.      (F41.9) Anxiety  Comment: well-controlled  Plan: FLUoxetine (PROZAC) 10 MG capsule        The current medical regimen is effective;  continue present plan and medications.       (H92.03) Otalgia, bilateral  Comment:   Plan: May be mild TMJ vs ETD.  Discussed using moist heat, Ibuprofen, avoid hard, chewy foods.  If symptoms are not improving in the next few weeks, she will let me know.       38 minutes spent on the date of the encounter doing chart review, patient visit and documentation        BMI:   Estimated body mass index is 28 kg/m  as calculated from the following:    Height as of this encounter: 1.61 m (5' 3.39\").    Weight as of this encounter: 72.6 kg (160 lb).           No follow-ups on file.    Debra Mercado NP  [facility]    Kali Power is a 62 year old, presenting for the following health issues:  Anxiety      Anxiety    History of Present Illness       Mental Health Follow-up:  Patient presents to follow-up on Depression & Anxiety.Patient's depression since last visit has been:  Good  The patient is not having other symptoms associated with depression.  Patient's anxiety since last visit has been:  Good  The patient is not having other symptoms associated with anxiety.  Any significant life events: No  Patient is not feeling anxious or having panic attacks.  Patient has no concerns about alcohol or drug use.    She eats 2-3 servings of fruits and vegetables daily.She " "consumes 1 sweetened beverage(s) daily.She exercises with enough effort to increase her heart rate 20 to 29 minutes per day.  She exercises with enough effort to increase her heart rate 7 days per week. She is missing 7 dose(s) of medications per week.    Today's PHQ-9         PHQ-9 Total Score: 1    PHQ-9 Q9 Thoughts of better off dead/self-harm past 2 weeks :   Not at all    How difficult have these problems made it for you to do your work, take care of things at home, or get along with other people: Somewhat difficult  Today's NORBERT-7 Score: 2     The 10-year ASCVD risk score (Yue SANTANA, et al., 2019) is: 6.4%    Values used to calculate the score:      Age: 62 years      Sex: Female      Is Non- : No      Diabetic: No      Tobacco smoker: No      Systolic Blood Pressure: 122 mmHg      Is BP treated: Yes      HDL Cholesterol: 54 mg/dL      Total Cholesterol: 253 mg/dL     She is on 10 mg of fluoxetine and doing well.  Her anxiety is well-controlled and she denies any side effects.  She takes Ativan only for flying.    She is on Metoprolol for hypertension and denies any side effects.  She is not checking blood pressure at home.      For the past 2 weeks, she has had some intermittent ear discomfort.  It is worse with cold.  She has not had any URI symptoms, no itching or drainage.  No change in hearing.     Review of Systems   Psychiatric/Behavioral: The patient is nervous/anxious.             Objective    BP (!) 153/68 (BP Location: Right arm, Patient Position: Chair, Cuff Size: Adult Regular)   Pulse 53   Temp 97.2  F (36.2  C) (Temporal)   Resp 16   Ht 1.61 m (5' 3.39\")   Wt 72.6 kg (160 lb)   LMP 12/18/2004   SpO2 99%   BMI 28.00 kg/m    Body mass index is 28 kg/m .  Physical Exam   GENERAL: healthy, alert and no distress  HENT: ear canals and TM's normal, nose and mouth without ulcers or lesions; mild tenderness at the TM joints bilaterally  NECK: no adenopathy, no asymmetry, " masses, or scars and thyroid normal to palpation  RESP: lungs clear to auscultation - no rales, rhonchi or wheezes  CV: regular rate and rhythm, normal S1 S2, no S3 or S4, no murmur, click or rub, no peripheral edema and peripheral pulses strong  PSYCH: mentation appears normal, affect normal/bright

## 2023-01-19 ENCOUNTER — NURSE TRIAGE (OUTPATIENT)
Dept: FAMILY MEDICINE | Facility: CLINIC | Age: 63
End: 2023-01-19
Payer: COMMERCIAL

## 2023-01-19 ENCOUNTER — MYC MEDICAL ADVICE (OUTPATIENT)
Dept: FAMILY MEDICINE | Facility: CLINIC | Age: 63
End: 2023-01-19

## 2023-01-19 DIAGNOSIS — U07.1 INFECTION DUE TO 2019 NOVEL CORONAVIRUS: Primary | ICD-10-CM

## 2023-01-19 PROCEDURE — 99207 PR NO CHARGE LOS: CPT | Mod: CS | Performed by: NURSE PRACTITIONER

## 2023-01-19 NOTE — TELEPHONE ENCOUNTER
Writer responded via [employer]..    INGRID OrellanaN, RN-BC  [facility]

## 2023-01-19 NOTE — TELEPHONE ENCOUNTER
Call received from patient:  1. COVID-19 positive today  2. Symptoms yesterday and tested positive this morning  3. Currently in [place]  4. Cough, low grade fever, fatigue, sore throat, muscle aches  5. No difficulty breathing nor chest pain  6. No vomiting, nausea, nor diarrhea    RN COVID TREATMENT VISIT  01/19/23    Sejal Devlin  62 year old  Current weight? 155 lbs    Has the patient been seen by a primary care provider at an [facility] or [facility] within the past two years? Yes.   Have you been in close proximity to/do you have a known exposure to a person with a confirmed case of influenza? No.     Date of positive COVID test (PCR or at home)?  1/19/23    Current COVID symptoms: cough, fatigue, muscle or body aches, headache and sore throat    Date COVID symptoms began: 1/18/23    Do you have any of the following conditions that place you at risk of being very sick from COVID-19? heart conditions    Is patient eligible to continue? Yes, established patient, 12 years or older weighing at least 88.2 lbs, who has COVID symptoms that started in the past 5 days and is at risk for being very sick from COVID-19.       Have you received monoclonal antibodies or oral antiviral medications since testing positive to COVID-19? No    Are you currently hospitalized for COVID-19? No    Do you have a history of hepatitis? No    Are you currently pregnant or nursing? No    Do you have a clinically significant hypersensitivity to nirmatrelvir, ritonavir, or molnupiravir? No    Do you have any history of severe renal impairment (eGFR < 30mL/min)? No    Do you have any history of hepatic impairment or abnormalities (e.g. hepatic panel, ALT, AST, ALK Phos, bilirubin)? No    Have you had a coronary stent placed in the previous 6 months? No    Is patient eligible to continue?   Yes, patient meets all eligibility requirements for the RN COVID treatment (as denoted by all no responses above).     Current  Outpatient Medications   Medication Sig Dispense Refill     B Complex-C-Iron (SUPER B-COMPLEX/IRON/VITAMIN C PO)        calcium-vitamin D (OSCAL) 250-125 MG-UNIT TABS per tablet Take 1 tablet by mouth 2 times daily       FLUoxetine (PROZAC) 10 MG capsule Take 1 capsule (10 mg) by mouth daily 90 capsule 3     loperamide (IMODIUM A-D) 2 MG tablet Take 1 tablet (2 mg) by mouth 4 times daily as needed for diarrhea 120 tablet 1     LORazepam (ATIVAN) 1 MG tablet Take 1 tablet (1 mg) by mouth every 6 hours as needed (flight anxiety) 15 tablet 0     metoprolol tartrate (LOPRESSOR) 25 MG tablet Take 1 tablet (25 mg) by mouth 2 times daily 180 tablet 3     VITAMIN D, CHOLECALCIFEROL, PO Take by mouth daily         Medications from List 1 of the standing order (on medications that exclude the use of Paxlovid) that patient is taking: NONE. Is patient taking Susan's Wort? No  Is patient taking Susan's Wort or any meds from List 1? No.   Medications from List 2 of the standing order (on meds that provider needs to adjust) that patient is taking: NONE. Is patient on any of the meds from List 2? No.   Medications from List 3 of standing order (on meds that a RN needs to adjust) that patient is taking: NONE. Is patient on any meds from List 3? No.   In order of efficacy, Paxlovid has an approximate 90% reduction in hospitalization. Paxlovid can possibly cause altered sense of taste, diarrhea (loose, watery stools), high blood pressure, muscle aches.  The other option is molnupiravir which has an approximate 30% reduction in hospitalization. Molnupirarivr can possibly cause diarrhea (loose, watery stools), nausea (feeling sick to your stomach), dizziness, headaches.    Which treatment option does the patient prefer?   Paxlovid.   Lab Results   Component Value Date    GFRESTIMATED 84 01/11/2023       Was last eGFR reduced? No, eGFR 60 or greater/ No Result on record. Patient can receive the normal renal function dose. Paxlovid  Rx sent to [facility].  Patient informed if they do not have it in stock, patient responsible to transfer paxlovid RX to a pharmacy that does have it in stock. Patient verbalized understanding and in agreement with plan.    Temporary change to home medications: None    All medication adjustments (holds, etc) were discussed with the patient and patient was asked to repeat back (teachback) their med adjustment.  Did patient understand med adjustment? No medication adjustments needed.         Reviewed the following instructions with the patient:    Paxlovid (nimatrelvir and ritonavir)    How it works  Two medicines (nirmatrelvir and ritonavir) are taken together. They stop the virus from growing. Less amount of virus is easier for your body to fight.    How to take    Medicine comes in a daily container with both medicine tablets. Take by mouth twice daily (once in the morning, once at night) for 5 days.    The number of tablets to take varies by patient.    Don't chew or break capsules. Swallow whole.    When to take  Take as soon as possible after positive COVID-19 test result, and within 5 days of your first symptoms.    Possible side effects  Can cause altered sense of taste, diarrhea (loose, watery stools), high blood pressure, muscle aches.    Celina Cevallos RN                 Reason for Disposition    [1] Fever > 100.0 F (37.8 C) AND [2] bedridden (e.g., nursing home patient, CVA, chronic illness, recovering from surgery)    Additional Information    Negative: SEVERE difficulty breathing (e.g., struggling for each breath, speaks in single words)    Negative: Difficult to awaken or acting confused (e.g., disoriented, slurred speech)    Negative: Bluish (or gray) lips or face now    Negative: Shock suspected (e.g., cold/pale/clammy skin, too weak to stand, low BP, rapid pulse)    Negative: Sounds like a life-threatening emergency to the triager    Negative: [1] Diagnosed or suspected COVID-19 AND [2] symptoms  lasting 3 or more weeks    Negative: [1] COVID-19 exposure AND [2] no symptoms    Negative: COVID-19 vaccine reaction suspected (e.g., fever, headache, muscle aches) occurring 1 to 3 days after getting vaccine    Negative: COVID-19 vaccine, questions about    Negative: [1] Lives with someone known to have influenza (flu test positive) AND [2] flu-like symptoms (e.g., cough, runny nose, sore throat, SOB; with or without fever)    Negative: [1] Adult with possible COVID-19 symptoms AND [2] triager concerned about severity of symptoms or other causes    Negative: COVID-19 and breastfeeding, questions about    Negative: SEVERE or constant chest pain or pressure  (Exception: Mild central chest pain, present only when coughing.)    Negative: MODERATE difficulty breathing (e.g., speaks in phrases, SOB even at rest, pulse 100-120)    Negative: Headache and stiff neck (can't touch chin to chest)    Negative: Oxygen level (e.g., pulse oximetry) 90 percent or lower    Negative: Chest pain or pressure    Negative: Patient sounds very sick or weak to the triager    Negative: MILD difficulty breathing (e.g., minimal/no SOB at rest, SOB with walking, pulse <100)    Negative: Fever > 103 F (39.4 C)    Negative: [1] Fever > 101 F (38.3 C) AND [2] over 60 years of age    Protocols used: CORONAVIRUS (COVID-19) DIAGNOSED OR [id number] 1.18.2022    NICOLAS Orellana, RN-BC  [facility]

## 2023-03-16 ENCOUNTER — OFFICE VISIT (OUTPATIENT)
Dept: FAMILY MEDICINE | Facility: CLINIC | Age: 63
End: 2023-03-16
Payer: COMMERCIAL

## 2023-03-16 DIAGNOSIS — L82.0 SEBORRHEIC KERATOSES, INFLAMED: Primary | ICD-10-CM

## 2023-03-16 PROCEDURE — 99202 OFFICE O/P NEW SF 15 MIN: CPT | Performed by: PHYSICIAN ASSISTANT

## 2023-03-16 ASSESSMENT — PAIN SCALES - GENERAL: PAINLEVEL: NO PAIN (0)

## 2023-03-16 NOTE — LETTER
3/16/2023         RE: Sejal Devlin  [address] [place] [address]        Dear Colleague,    Thank you for referring your patient, Sejal Devlin, to the [facility]. Please see a copy of my visit note below.    [facility] Dermatology Note  Encounter Date: Mar 16, 2023  Office Visit     Dermatology Problem List:  1. Inflamed seborrheic keratosis   ____________________________________________    Assessment & Plan:    # Inflamed seborrheic keratosis, right eyebrow  - Patient would like to defer treatment at this time because she has a photo shoot latera this week, may consider in future     Procedures Performed:   None.    Follow-up: prn for new or changing lesions    Staff and Scribe:     Scribe Disclosure:  I, Marielena Alba, am serving as a scribe to document services personally performed by Bernadette Sierra PA-C based on data collection and the provider's statements to me.   Provider Disclosure:   The documentation recorded by the scribe accurately reflects the services I personally performed and the decisions made by me.    All risks, benefits and alternatives were discussed with patient.  Patient is in agreement and understands the assessment and plan.  All questions were answered.    Bernadette Sierra PA-C, MPAS  [facility]: Phone: [phone], Fax: [phone]  [facility]: Phone: [phone],  Fax: [phone]  [facility]: Phone: [phone], Fax: [phone]    ____________________________________________    CC: Derm Problem (Concerned about bump on R eye brow present for years no pain but some itching at times)    HPI:  Ms. Sejal Devlin is a(n) 62 year old female who presents today as a new patient for Spot check. Patient reports a bump that has been present for years. It grew back and it is itchy.     Patient is otherwise  feeling well, without additional skin concerns.    Labs Reviewed:  N/A    Physical Exam:  Vitals: LMP 12/18/2004   SKIN: Focused examination of right eyebrow was performed.  - There is a tan to brown waxy stuck on papule with surrounding erythema on the right eyebrow.   - No other lesions of concern on areas examined.     Medications:  Current Outpatient Medications   Medication     B Complex-C-Iron (SUPER B-COMPLEX/IRON/VITAMIN C PO)     calcium-vitamin D (OSCAL) 250-125 MG-UNIT TABS per tablet     FLUoxetine (PROZAC) 10 MG capsule     loperamide (IMODIUM A-D) 2 MG tablet     LORazepam (ATIVAN) 1 MG tablet     metoprolol tartrate (LOPRESSOR) 25 MG tablet     VITAMIN D, CHOLECALCIFEROL, PO     No current facility-administered medications for this visit.      Past Medical History:   Patient Active Problem List   Diagnosis     Osteopenia     Family history of colon cancer     Dry mouth     Vitamin D deficiency     Sjogren's syndrome (H)     Hyperplastic colon polyp     Shortness of breath     Restless legs syndrome (RLS)     Amblyopia of right eye     Esotropia, right eye     Past Medical History:   Diagnosis Date     Diverticulosis of colon 2008    as noted on colonoscopy     Family history of colon cancer 5/22/2012     Family history of colon cancer      Family history of colon cancer      Family history of colon cancer      Family history of colon cancer      Family history of colon cancer      Postmenopausal 2005       Again, thank you for allowing me to participate in the care of your patient.        Sincerely,        Bernadette Sierra PA-C

## 2023-03-26 ENCOUNTER — HEALTH MAINTENANCE LETTER (OUTPATIENT)
Age: 63
End: 2023-03-26

## 2023-03-27 RX ORDER — METOPROLOL TARTRATE 25 MG/1
TABLET, FILM COATED ORAL
Qty: 180 TABLET | Refills: 0 | OUTPATIENT
Start: 2023-03-27

## 2023-04-09 DIAGNOSIS — F41.9 ANXIETY: ICD-10-CM

## 2023-04-09 RX ORDER — FLUOXETINE 10 MG/1
CAPSULE ORAL
Qty: 90 CAPSULE | Refills: 3 | OUTPATIENT
Start: 2023-04-09

## 2023-06-06 ENCOUNTER — ANCILLARY PROCEDURE (OUTPATIENT)
Dept: MAMMOGRAPHY | Facility: CLINIC | Age: 63
End: 2023-06-06
Attending: NURSE PRACTITIONER
Payer: COMMERCIAL

## 2023-06-06 DIAGNOSIS — Z12.31 VISIT FOR SCREENING MAMMOGRAM: ICD-10-CM

## 2023-06-06 PROCEDURE — 77067 SCR MAMMO BI INCL CAD: CPT | Mod: TC | Performed by: RADIOLOGY

## 2023-10-13 ENCOUNTER — PATIENT OUTREACH (OUTPATIENT)
Dept: GASTROENTEROLOGY | Facility: CLINIC | Age: 63
End: 2023-10-13
Payer: COMMERCIAL

## 2023-10-13 DIAGNOSIS — Z12.11 SPECIAL SCREENING FOR MALIGNANT NEOPLASMS, COLON: Primary | ICD-10-CM

## 2023-10-13 NOTE — PROGRESS NOTES
"CRC Screening Colonoscopy Referral Review    Patient meets the inclusion criteria for screening colonoscopy standing order.    Ordering/Referring Provider:  Debra Mercado NP    BMI: Estimated body mass index is 28 kg/m  as calculated from the following:    Height as of 1/11/23: 1.61 m (5' 3.39\").    Weight as of 1/11/23: 72.6 kg (160 lb).     Sedation:  Does patient have any of the following conditions affecting sedation?  Hx of severe PTSD, anxiety, or psychosis: MAC sedation recommended    Previous Scopes:  Any previous recommendations or follow up needs based on previous scope?  na / No recommendations.    Medical Concerns to Postpone Order:  Does patient have any of the following medical concerns that should postpone/delay colonoscopy referral?  No medical conditions affecting colonoscopy referral.    Final Referral Details:  Based on patient's medical history patient is appropriate for referral order with MAC/deep sedation.   BMI<= 45 45 < BMI <= 48 48 < BMI < = 50  BMI > 50   No Restrictions No [facility]  No [facility]  [facility] with exceptions Hospital Only OR Only         "

## 2023-10-23 ENCOUNTER — MYC MEDICAL ADVICE (OUTPATIENT)
Dept: FAMILY MEDICINE | Facility: CLINIC | Age: 63
End: 2023-10-23
Payer: COMMERCIAL

## 2023-10-23 DIAGNOSIS — F41.9 ANXIETY: ICD-10-CM

## 2023-10-23 DIAGNOSIS — I10 BENIGN ESSENTIAL HYPERTENSION: ICD-10-CM

## 2023-10-23 RX ORDER — FLUOXETINE 10 MG/1
10 CAPSULE ORAL DAILY
Qty: 90 CAPSULE | Refills: 0 | Status: SHIPPED | OUTPATIENT
Start: 2023-10-23 | End: 2024-04-04

## 2023-10-23 RX ORDER — METOPROLOL TARTRATE 25 MG/1
25 TABLET, FILM COATED ORAL 2 TIMES DAILY
Qty: 180 TABLET | Refills: 0 | Status: SHIPPED | OUTPATIENT
Start: 2023-10-23 | End: 2024-04-04

## 2023-11-06 ENCOUNTER — TELEPHONE (OUTPATIENT)
Dept: GASTROENTEROLOGY | Facility: CLINIC | Age: 63
End: 2023-11-06
Payer: COMMERCIAL

## 2023-11-06 NOTE — TELEPHONE ENCOUNTER
"Endoscopy Scheduling Screen    Have you had a positive Covid test in the last 14 days?  No    Are you active on MyChart?   Yes    What insurance is in the chart?  Other:  [facility]    Ordering/Referring Provider: WEN   (If ordering provider performs procedure, schedule with ordering provider unless otherwise instructed. )    BMI: Estimated body mass index is 28 kg/m  as calculated from the following:    Height as of 1/11/23: 1.61 m (5' 3.39\").    Weight as of 1/11/23: 72.6 kg (160 lb).     Sedation Ordered  MAC/DEEP SEDATION      Are you taking any prescription medications for pain 3 or more times per week?   No    Do you have a history of malignant hyperthermia or adverse reaction to anesthesia?  No    (Females) Are you currently pregnant?   No     Have you been diagnosed or told you have pulmonary hypertension?   No    Do you have an LVAD?  No    Have you been told you have moderate to severe sleep apnea?  No    Have you been told you have COPD, asthma, or any other lung disease?  No    Do you have any heart conditions?  No     Have you ever had an organ transplant?   No    Have you ever had or are you awaiting a heart or lung transplant?   No    Have you had a stroke or transient ischemic attack (TIA aka \"mini stroke\" in the last 6 months?   No    Have you been diagnosed with or been told you have cirrhosis of the liver?   No    Are you currently on dialysis?   No    Do you need assistance transferring?   No    BMI: Estimated body mass index is 28 kg/m  as calculated from the following:    Height as of 1/11/23: 1.61 m (5' 3.39\").    Weight as of 1/11/23: 72.6 kg (160 lb).     Is patients BMI > 40 and scheduling location [facility]?  No    Do you take an injectable medication for weight loss or diabetes (excluding insulin)?  No    Do you take the medication Naltrexone?  No    Do you take blood thinners?  No       Prep   Are you currently on dialysis or do you have chronic kidney disease?  No    Do you have a diagnosis of " diabetes?  No    Do you have a diagnosis of cystic fibrosis (CF)?  No    On a regular basis do you go 3 -5 days between bowel movements?  No    BMI > 40?  No    Preferred Pharmacy:  [facility]/pharmacy #[id number] - [place], [place] - [address]  [address] [place] [address]  Phone: [phone] Fax: [phone]      Final Scheduling Details   Colonoscopy prep sent?  Standard MiraLAX    Procedure scheduled  Colonoscopy    Surgeon:  Terra     Date of procedure:  02/27/2024     Pre-OP / PAC:   No - Not required for this site.    Location  [facility] - [facility] - Patient preference.    Sedation   MAC/Deep Sedation - Per order.      Patient Reminders:   You will receive a call from a Nurse to review instructions and health history.  This assessment must be completed prior to your procedure.  Failure to complete the Nurse assessment may result in the procedure being cancelled.      On the day of your procedure, please designate an adult(s) who can drive you home stay with you for the next 24 hours. The medicines used in the exam will make you sleepy. You will not be able to drive.      You cannot take public transportation, ride share services, or non-medical taxi service without a responsible caregiver.  Medical transport services are allowed with the requirement that a responsible caregiver will receive you at your destination.  We require that drivers and caregivers are confirmed prior to your procedure.

## 2023-11-10 ENCOUNTER — LAB (OUTPATIENT)
Dept: FAMILY MEDICINE | Facility: CLINIC | Age: 63
End: 2023-11-10
Payer: COMMERCIAL

## 2023-11-10 DIAGNOSIS — Z12.11 SPECIAL SCREENING FOR MALIGNANT NEOPLASMS, COLON: ICD-10-CM

## 2024-02-13 ENCOUNTER — TELEPHONE (OUTPATIENT)
Dept: GASTROENTEROLOGY | Facility: CLINIC | Age: 64
End: 2024-02-13
Payer: COMMERCIAL

## 2024-02-13 NOTE — TELEPHONE ENCOUNTER
Pre visit planning completed.      Procedure details:    Patient scheduled for Colonoscopy  on 2/27/24.     Arrival time: 0815. Procedure time 0915    Pre op exam needed? N/A    Facility location: [facility]; [address] [place] [address], [address] Floor, [place] [address]    Sedation type: MAC    Indication for procedure: Screening      Chart review:     Electronic implanted devices? No    Recent diagnosis of diverticulitis within the last 6 weeks? No    Diabetic? No    Diabetic medication HOLDING recommendations: (if applicable)  Oral diabetic medications: No  Diabetic injectables: No  Insulin: No      Medication review:    Anticoagulants? No    NSAIDS? No    Other medication HOLDING recommendations:  N/A      Prep for procedure:     Bowel prep recommendation: Standard Miralax   Due to:  standard bowel prep.    Prep instructions sent via [employer]       Debra Aguila RN  Endoscopy Procedure Pre Assessment RN  [phone] option 4

## 2024-02-14 NOTE — TELEPHONE ENCOUNTER
Pre assessment completed for upcoming procedure.      Procedure details:    Patient scheduled for Colonoscopy  on 2.27.24.     Arrival time: 0815. Procedure time 0915    Pre op exam needed? N/A    Facility location: [facility]; [address] [place] [address], [address] Floor, [place] [address]    Sedation type: MAC    Indication for procedure: screening    COVID policy reviewed.    Designated  policy reviewed. Instructed to have someone stay 24 hours post procedure.       Chart review:     Electronic implanted devices? No    Recent diagnosis of diverticulitis within the last 6 weeks?  No    Diabetic? No      Medication review:    Anticoagulants? No    NSAIDS? No    Other medication HOLDING recommendations:  N/A      Prep for procedure:     Bowel prep recommendation: Standard Miralax  Due to: standard bowel prep.    Prep instructions sent via [employer]     Reviewed procedure prep instructions.     Patient verbalized understanding and had no questions or concerns at this time.        Kelsie Joshi RN  Endoscopy Procedure Pre Assessment RN  [phone] option 4

## 2024-02-26 ENCOUNTER — ANESTHESIA EVENT (OUTPATIENT)
Dept: SURGERY | Facility: AMBULATORY SURGERY CENTER | Age: 64
End: 2024-02-26
Payer: COMMERCIAL

## 2024-02-26 RX ORDER — HYDROMORPHONE HYDROCHLORIDE 1 MG/ML
0.4 INJECTION, SOLUTION INTRAMUSCULAR; INTRAVENOUS; SUBCUTANEOUS EVERY 5 MIN PRN
Status: CANCELLED | OUTPATIENT
Start: 2024-02-26

## 2024-02-26 RX ORDER — ONDANSETRON 2 MG/ML
4 INJECTION INTRAMUSCULAR; INTRAVENOUS EVERY 30 MIN PRN
Status: CANCELLED | OUTPATIENT
Start: 2024-02-26

## 2024-02-26 RX ORDER — NALOXONE HYDROCHLORIDE 0.4 MG/ML
0.1 INJECTION, SOLUTION INTRAMUSCULAR; INTRAVENOUS; SUBCUTANEOUS
Status: CANCELLED | OUTPATIENT
Start: 2024-02-26

## 2024-02-26 RX ORDER — ONDANSETRON 4 MG/1
4 TABLET, ORALLY DISINTEGRATING ORAL EVERY 30 MIN PRN
Status: CANCELLED | OUTPATIENT
Start: 2024-02-26

## 2024-02-26 RX ORDER — SODIUM CHLORIDE, SODIUM LACTATE, POTASSIUM CHLORIDE, CALCIUM CHLORIDE 600; 310; 30; 20 MG/100ML; MG/100ML; MG/100ML; MG/100ML
INJECTION, SOLUTION INTRAVENOUS CONTINUOUS
Status: CANCELLED | OUTPATIENT
Start: 2024-02-26

## 2024-02-26 RX ORDER — HYDROMORPHONE HYDROCHLORIDE 1 MG/ML
0.2 INJECTION, SOLUTION INTRAMUSCULAR; INTRAVENOUS; SUBCUTANEOUS EVERY 5 MIN PRN
Status: CANCELLED | OUTPATIENT
Start: 2024-02-26

## 2024-02-27 ENCOUNTER — HOSPITAL ENCOUNTER (OUTPATIENT)
Facility: AMBULATORY SURGERY CENTER | Age: 64
Discharge: HOME | End: 2024-02-27
Attending: INTERNAL MEDICINE
Payer: COMMERCIAL

## 2024-02-27 ENCOUNTER — ANESTHESIA (OUTPATIENT)
Dept: SURGERY | Facility: AMBULATORY SURGERY CENTER | Age: 64
End: 2024-02-27
Payer: COMMERCIAL

## 2024-02-27 VITALS
TEMPERATURE: 97.1 F | BODY MASS INDEX: 26.13 KG/M2 | OXYGEN SATURATION: 99 % | DIASTOLIC BLOOD PRESSURE: 89 MMHG | WEIGHT: 147.5 LBS | RESPIRATION RATE: 16 BRPM | SYSTOLIC BLOOD PRESSURE: 138 MMHG | HEIGHT: 63 IN | HEART RATE: 68 BPM

## 2024-02-27 VITALS — HEART RATE: 68 BPM

## 2024-02-27 LAB — COLONOSCOPY: NORMAL

## 2024-02-27 PROCEDURE — 45385 COLONOSCOPY W/LESION REMOVAL: CPT | Mod: 33 | Performed by: INTERNAL MEDICINE

## 2024-02-27 PROCEDURE — 45385 COLONOSCOPY W/LESION REMOVAL: CPT | Performed by: ANESTHESIOLOGY

## 2024-02-27 PROCEDURE — 45385 COLONOSCOPY W/LESION REMOVAL: CPT | Performed by: NURSE ANESTHETIST, CERTIFIED REGISTERED

## 2024-02-27 PROCEDURE — 88305 TISSUE EXAM BY PATHOLOGIST: CPT | Mod: 26 | Performed by: PATHOLOGY

## 2024-02-27 PROCEDURE — 88305 TISSUE EXAM BY PATHOLOGIST: CPT | Mod: TC | Performed by: INTERNAL MEDICINE

## 2024-02-27 RX ORDER — LIDOCAINE HYDROCHLORIDE 20 MG/ML
INJECTION, SOLUTION INFILTRATION; PERINEURAL PRN
Status: DISCONTINUED | OUTPATIENT
Start: 2024-02-27 | End: 2024-02-27

## 2024-02-27 RX ORDER — ONDANSETRON 2 MG/ML
4 INJECTION INTRAMUSCULAR; INTRAVENOUS
Status: DISCONTINUED | OUTPATIENT
Start: 2024-02-27 | End: 2024-02-28 | Stop reason: HOSPADM

## 2024-02-27 RX ORDER — LIDOCAINE 40 MG/G
CREAM TOPICAL
Status: DISCONTINUED | OUTPATIENT
Start: 2024-02-27 | End: 2024-02-28 | Stop reason: HOSPADM

## 2024-02-27 RX ORDER — SODIUM CHLORIDE, SODIUM LACTATE, POTASSIUM CHLORIDE, CALCIUM CHLORIDE 600; 310; 30; 20 MG/100ML; MG/100ML; MG/100ML; MG/100ML
INJECTION, SOLUTION INTRAVENOUS CONTINUOUS
Status: DISCONTINUED | OUTPATIENT
Start: 2024-02-27 | End: 2024-02-28 | Stop reason: HOSPADM

## 2024-02-27 RX ORDER — PROPOFOL 10 MG/ML
INJECTION, EMULSION INTRAVENOUS PRN
Status: DISCONTINUED | OUTPATIENT
Start: 2024-02-27 | End: 2024-02-27

## 2024-02-27 RX ORDER — PROPOFOL 10 MG/ML
INJECTION, EMULSION INTRAVENOUS CONTINUOUS PRN
Status: DISCONTINUED | OUTPATIENT
Start: 2024-02-27 | End: 2024-02-27

## 2024-02-27 RX ADMIN — PROPOFOL 50 MG: 10 INJECTION, EMULSION INTRAVENOUS at 09:33

## 2024-02-27 RX ADMIN — PROPOFOL 150 MCG/KG/MIN: 10 INJECTION, EMULSION INTRAVENOUS at 09:33

## 2024-02-27 RX ADMIN — LIDOCAINE HYDROCHLORIDE 60 MG: 20 INJECTION, SOLUTION INFILTRATION; PERINEURAL at 09:29

## 2024-02-27 RX ADMIN — SODIUM CHLORIDE, SODIUM LACTATE, POTASSIUM CHLORIDE, CALCIUM CHLORIDE: 600; 310; 30; 20 INJECTION, SOLUTION INTRAVENOUS at 09:26

## 2024-02-27 RX ADMIN — PROPOFOL 50 MG: 10 INJECTION, EMULSION INTRAVENOUS at 09:36

## 2024-02-27 ASSESSMENT — LIFESTYLE VARIABLES: TOBACCO_USE: 1

## 2024-02-27 NOTE — ANESTHESIA CARE TRANSFER NOTE
Patient: Sejal Devlin    Procedure: Procedure(s):  COLONOSCOPY, WITH POLYPECTOMY       Diagnosis: Special screening for malignant neoplasms, colon [Z12.11]  Diagnosis Additional Information: No value filed.    Anesthesia Type:   MAC     Note:    Oropharynx: oropharynx clear of all foreign objects and spontaneously breathing  Level of Consciousness: awake  Oxygen Supplementation: room air    Independent Airway: airway patency satisfactory and stable  Dentition: dentition unchanged  Vital Signs Stable: post-procedure vital signs reviewed and stable  Report to RN Given: handoff report given  Patient transferred to: Phase II    Handoff Report: Identifed the Patient, Identified the Reponsible Provider, Reviewed the pertinent medical history, Discussed the surgical course, Reviewed Intra-OP anesthesia mangement and issues during anesthesia, Set expectations for post-procedure period and Allowed opportunity for questions and acknowledgement of understanding    SEE POST OP VITALS  Vitals:  Vitals Value Taken Time   BP     Temp     Pulse     Resp     SpO2         Electronically Signed By: JONATAN Saeed CRNA  February 27, 2024  10:34 AM

## 2024-02-27 NOTE — H&P
Sejal CIPRIANO Claus  [id number]  female  63 year old      Reason for procedure/surgery: screening    Patient Active Problem List   Diagnosis    Osteopenia    Family history of colon cancer    Dry mouth    Vitamin D deficiency    Sjogren's syndrome (H24)    Hyperplastic colon polyp    Shortness of breath    Restless legs syndrome (RLS)    Amblyopia of right eye    Esotropia, right eye       Past Surgical History:    Past Surgical History:   Procedure Laterality Date    APPENDECTOMY      COLONOSCOPY      COLONOSCOPY Left 2019    Procedure: COMBINED COLONOSCOPY, SINGLE OR MULTIPLE BIOPSY/POLYPECTOMY BY BIOPSY;  Surgeon: Olesya Phipps MD;  Location: [facility] OR    ESOPHAGOSCOPY, GASTROSCOPY, DUODENOSCOPY (EGD), COMBINED N/A 2018    Procedure: COMBINED ESOPHAGOSCOPY, GASTROSCOPY, DUODENOSCOPY (EGD);  egd;  Surgeon: Poonam Alatorre MD;  Location: [facility] OR    ESOPHAGOSCOPY, GASTROSCOPY, DUODENOSCOPY (EGD), COMBINED N/A 2018    Procedure: COMBINED ESOPHAGOSCOPY, GASTROSCOPY, DUODENOSCOPY (EGD), BIOPSY SINGLE OR MULTIPLE;;  Surgeon: Poonam Alatorre MD;  Location: [facility] OR    GYN SURGERY          HC REMOVAL GALLBLADDER      Cholecystectomy    ORTHOPEDIC SURGERY  2016    Broken right radius    SURGICAL HISTORY OF -       c- section       Past Medical History:   Past Medical History:   Diagnosis Date    Diverticulosis of colon     as noted on colonoscopy    Family history of colon cancer 2012    Family history of colon cancer     Family history of colon cancer     Family history of colon cancer     Family history of colon cancer     Family history of colon cancer     Postmenopausal        Social History:   Social History     Tobacco Use    Smoking status: Former     Packs/day: 1.00     Years: 10.00     Additional pack years: 0.00     Total pack years: 10.00     Types: Cigarettes     Start date: 2002     Quit date: 2008     Years since quittin.1    Smokeless  tobacco: Never    Tobacco comments:     smoked age [age], quit x 1 yr and resumed then quit after one yr   Substance Use Topics    Alcohol use: Yes     Comment: 1-2 per day       Family History:   Family History   Problem Relation Age of Onset    C.A.D. Mother         50s    Cancer - colorectal Mother          age 64, diagnosed age 54    Circulatory Mother     Coronary Artery Disease Mother     Colon Cancer Mother     Hypertension Mother     C.A.D. Father     Eye Disorder Father         glaucoma    Lipids Father     Cancer Father         kidney    Hyperlipidemia Father     Substance Abuse Brother     Cardiovascular Brother         MI, stent at 52    Connective Tissue Disorder Other         cousin on father side has lupus    Coronary Artery Disease Brother     Coronary Artery Disease Brother     Substance Abuse Brother        Allergies:   Allergies   Allergen Reactions    No Known Drug Allergy     Seasonal Allergies        Active Medications:   Current Outpatient Medications   Medication Sig Dispense Refill    B Complex-C-Iron (SUPER B-COMPLEX/IRON/VITAMIN C PO)       calcium-vitamin D (OSCAL) 250-125 MG-UNIT TABS per tablet Take 1 tablet by mouth 2 times daily      FLUoxetine (PROZAC) 10 MG capsule Take 1 capsule (10 mg) by mouth daily 90 capsule 0    loperamide (IMODIUM A-D) 2 MG tablet Take 1 tablet (2 mg) by mouth 4 times daily as needed for diarrhea 120 tablet 1    metoprolol tartrate (LOPRESSOR) 25 MG tablet Take 1 tablet (25 mg) by mouth 2 times daily 180 tablet 0    VITAMIN D, CHOLECALCIFEROL, PO Take by mouth daily         Systemic Review:   CONSTITUTIONAL: NEGATIVE for fever, chills, change in weight  ENT/MOUTH: NEGATIVE for ear, mouth and throat problems  RESP: NEGATIVE for significant cough or SOB  CV: NEGATIVE for chest pain, palpitations or peripheral edema    Physical Examination:   Vital Signs: BP (!) 155/96 (BP Location: Right arm)   Pulse 102   Temp 97  F (36.1  C) (Temporal)   Resp 18   Ht  "1.6 m (5' 3\")   Wt 66.9 kg (147 lb 8 oz)   LMP 12/18/2004   SpO2 98%   BMI 26.13 kg/m    GENERAL: healthy, alert and no distress  NECK: no adenopathy, no asymmetry, masses, or scars  RESP: lungs clear to auscultation - no rales, rhonchi or wheezes  CV: regular rate and rhythm, normal S1 S2, no S3 or S4, no murmur, click or rub, no peripheral edema and peripheral pulses strong  ABDOMEN: soft, nontender, no hepatosplenomegaly, no masses and bowel sounds normal  MS: no gross musculoskeletal defects noted, no edema    Plan: Appropriate to proceed as scheduled.      Helen Ellison MD  2/27/2024    PCP:  Debra Mercado"

## 2024-02-27 NOTE — ANESTHESIA PREPROCEDURE EVALUATION
Anesthesia Pre-Procedure Evaluation    Patient: Sejal Devlin   MRN: [id number] : 1960        Procedure : Procedure(s):  Colonoscopy          Past Medical History:   Diagnosis Date     Diverticulosis of colon     as noted on colonoscopy     Family history of colon cancer 2012     Family history of colon cancer      Family history of colon cancer      Family history of colon cancer      Family history of colon cancer      Family history of colon cancer      Postmenopausal 2005      Past Surgical History:   Procedure Laterality Date     APPENDECTOMY       COLONOSCOPY  2013     COLONOSCOPY Left 2019    Procedure: COMBINED COLONOSCOPY, SINGLE OR MULTIPLE BIOPSY/POLYPECTOMY BY BIOPSY;  Surgeon: Olesya Phipps MD;  [facility]     ESOPHAGOSCOPY, GASTROSCOPY, DUODENOSCOPY (EGD), COMBINED N/A 2018    Procedure: COMBINED ESOPHAGOSCOPY, GASTROSCOPY, DUODENOSCOPY (EGD);  egd;  Surgeon: Poonam Alatorre MD;  [facility]     ESOPHAGOSCOPY, GASTROSCOPY, DUODENOSCOPY (EGD), COMBINED N/A 2018    Procedure: COMBINED ESOPHAGOSCOPY, GASTROSCOPY, DUODENOSCOPY (EGD), BIOPSY SINGLE OR MULTIPLE;;  Surgeon: Poonam Alatorre MD;  [facility]     GYN SURGERY           HC REMOVAL GALLBLADDER      Cholecystectomy     ORTHOPEDIC SURGERY  2016    Broken right radius     SURGICAL HISTORY OF -       c- section      Allergies   Allergen Reactions     No Known Drug Allergy      Seasonal Allergies       Social History     Tobacco Use     Smoking status: Former     Packs/day: 1.00     Years: 10.00     Additional pack years: 0.00     Total pack years: 10.00     Types: Cigarettes     Start date: 2002     Quit date: 2008     Years since quittin.1     Smokeless tobacco: Never     Tobacco comments:     smoked age [age], quit x 1 yr and resumed then quit after one yr   Substance Use Topics     Alcohol use: Yes     Comment: 1-2 per day      Wt Readings from Last 1  "Encounters:   02/27/24 66.9 kg (147 lb 8 oz)        Anesthesia Evaluation            ROS/MED HX  ENT/Pulmonary:     (+)                tobacco use, Past use,  16  Pack-Year Hx,                      Neurologic:  - neg neurologic ROS     Cardiovascular:  - neg cardiovascular ROS     METS/Exercise Tolerance: >4 METS    Hematologic:  - neg hematologic  ROS     Musculoskeletal:  - neg musculoskeletal ROS     GI/Hepatic:     (+)        bowel prep,            Renal/Genitourinary:  - neg Renal ROS     Endo:  - neg endo ROS     Psychiatric/Substance Use:     (+) psychiatric history anxiety       Infectious Disease:  - neg infectious disease ROS     Malignancy:  - neg malignancy ROS     Other: Comment: [place] and Restless Leg Syndrome           Physical Exam    Airway        Mallampati: I   TM distance: > 3 FB   Neck ROM: full   Mouth opening: > 3 cm    Respiratory Devices and Support         Dental       (+) Multiple crowns, permanant bridges      Cardiovascular   cardiovascular exam normal       Rhythm and rate: regular     Pulmonary   pulmonary exam normal        breath sounds clear to auscultation       OUTSIDE LABS:  CBC:   Lab Results   Component Value Date    WBC 5.0 09/08/2021    WBC 5.6 12/04/2019    HGB 14.0 09/08/2021    HGB 12.9 12/04/2019    HCT 43.2 09/08/2021    HCT 40.6 12/04/2019     09/08/2021     12/04/2019     BMP:   Lab Results   Component Value Date     01/11/2023     09/08/2021    POTASSIUM 5.1 01/11/2023    POTASSIUM 4.3 09/08/2021    CHLORIDE 102 01/11/2023    CHLORIDE 105 09/08/2021    CO2 25 01/11/2023    CO2 26 09/08/2021    BUN 15.6 01/11/2023    BUN 16 09/08/2021    CR 0.79 01/11/2023    CR 0.82 09/08/2021     (H) 01/11/2023    GLC 95 09/08/2021     COAGS: No results found for: \"PTT\", \"INR\", \"FIBR\"  POC: No results found for: \"BGM\", \"HCG\", \"HCGS\"  HEPATIC:   Lab Results   Component Value Date    ALBUMIN 3.8 09/08/2021    PROTTOTAL 7.2 09/08/2021    ALT 13 " "09/08/2021    AST 16 09/08/2021    ALKPHOS 74 09/08/2021    BILITOTAL 0.5 09/08/2021     OTHER:   Lab Results   Component Value Date    A1C 5.7 03/05/2014    RICK 10.1 01/11/2023    PHOS 3.3 06/23/2010    TSH 1.550 11/11/2019    CRP <2.9 08/29/2014    SED 11 08/29/2014       Anesthesia Plan    ASA Status:  2    NPO Status:  NPO Appropriate    Anesthesia Type: MAC.     - Reason for MAC: immobility needed              Consents    Anesthesia Plan(s) and associated risks, benefits, and realistic alternatives discussed. Questions answered and patient/representative(s) expressed understanding.     - Discussed: Risks, Benefits and Alternatives for BOTH SEDATION and the PROCEDURE were discussed     - Discussed with:  Patient      - Extended Intubation/Ventilatory Support Discussed: No.      - Patient is DNR/DNI Status: No     Use of blood products discussed: No .     Postoperative Care       PONV prophylaxis: Background Propofol Infusion     Comments:               Gina Vargas MD    I have reviewed the pertinent notes and labs in the chart from the past 30 days and (re)examined the patient.  Any updates or changes from those notes are reflected in this note.              # Overweight: Estimated body mass index is 26.13 kg/m  as calculated from the following:    Height as of this encounter: 1.6 m (5' 3\").    Weight as of this encounter: 66.9 kg (147 lb 8 oz).      "

## 2024-02-27 NOTE — ANESTHESIA POSTPROCEDURE EVALUATION
Patient: Sejal Devlin    Procedure: Procedure(s):  COLONOSCOPY, WITH POLYPECTOMY       Anesthesia Type:  MAC    Note:  Disposition: Outpatient   Postop Pain Control: Uneventful            Sign Out: Well controlled pain   PONV: No   Neuro/Psych: Uneventful            Sign Out: Acceptable/Baseline neuro status   Airway/Respiratory: Uneventful            Sign Out: Acceptable/Baseline resp. status   CV/Hemodynamics: Uneventful            Sign Out: Acceptable CV status; No obvious hypovolemia; No obvious fluid overload   Other NRE: NONE   DID A NON-ROUTINE EVENT OCCUR? No       Last vitals:  Vitals Value Taken Time   /89 02/27/24 1100   Temp 36.2  C (97.1  F) 02/27/24 1100   Pulse 68 02/27/24 1100   Resp 16 02/27/24 1100   SpO2 99 % 02/27/24 1100       Electronically Signed By: Gina Vargas MD  February 27, 2024  11:56 AM

## 2024-02-28 ENCOUNTER — PATIENT OUTREACH (OUTPATIENT)
Dept: GASTROENTEROLOGY | Facility: CLINIC | Age: 64
End: 2024-02-28
Payer: COMMERCIAL

## 2024-02-28 LAB
PATH REPORT.COMMENTS IMP SPEC: NORMAL
PATH REPORT.COMMENTS IMP SPEC: NORMAL
PATH REPORT.FINAL DX SPEC: NORMAL
PATH REPORT.GROSS SPEC: NORMAL
PATH REPORT.MICROSCOPIC SPEC OTHER STN: NORMAL
PATH REPORT.RELEVANT HX SPEC: NORMAL
PHOTO IMAGE: NORMAL

## 2024-02-28 NOTE — TELEPHONE ENCOUNTER
Called pt to discuss follow up to Dr Ellison's procedure and Dr Longoria's recommendations.     Procedure/Imaging/Clinic: Colonoscopy with EMR   Physician: Von   Timing: next avail   Scope time needed:45 min   Anesthesia:MAC   Dx: cecal polyp   Tier:3   Location: [facility]   Header of letter for pt communication: Colonoscopy to remove polyp     Pt would like to delay this until July d/t her insurance changing to Medicare once she is 64 yrs old. Did want to know if this was advised and if the polyp pathology was back to indicate otherwise.     Message routed to Dr Ellison, will delay procedure planning for several months until July calendar is available. Reminder sent.    Jackie Gonzalez, RN, BSN,   [facility]  Care coordinator

## 2024-04-04 DIAGNOSIS — I10 BENIGN ESSENTIAL HYPERTENSION: ICD-10-CM

## 2024-04-04 DIAGNOSIS — F41.9 ANXIETY: ICD-10-CM

## 2024-04-04 RX ORDER — METOPROLOL TARTRATE 25 MG/1
25 TABLET, FILM COATED ORAL 2 TIMES DAILY
Qty: 60 TABLET | Refills: 0 | Status: SHIPPED | OUTPATIENT
Start: 2024-04-04 | End: 2024-07-30

## 2024-04-04 RX ORDER — FLUOXETINE 10 MG/1
10 CAPSULE ORAL DAILY
Qty: 30 CAPSULE | Refills: 0 | Status: SHIPPED | OUTPATIENT
Start: 2024-04-04 | End: 2024-04-30

## 2024-04-18 DIAGNOSIS — I10 BENIGN ESSENTIAL HYPERTENSION: ICD-10-CM

## 2024-04-19 RX ORDER — METOPROLOL TARTRATE 25 MG/1
25 TABLET, FILM COATED ORAL 2 TIMES DAILY
Qty: 180 TABLET | Refills: 1 | OUTPATIENT
Start: 2024-04-19

## 2024-05-07 ENCOUNTER — PREP FOR PROCEDURE (OUTPATIENT)
Dept: GASTROENTEROLOGY | Facility: CLINIC | Age: 64
End: 2024-05-07
Payer: COMMERCIAL

## 2024-05-07 DIAGNOSIS — K63.5 CECAL POLYP: Primary | ICD-10-CM

## 2024-05-07 NOTE — TELEPHONE ENCOUNTER
Pt returned call to coordinate follow up scope with Dr Longoria. She would be in agreement with [facility] 8/22 first endo case of day. Would prefer morning arrival.     Explained they can expect a call from  for date of procedure, OR will call 1-2 days prior to procedure date with arrival time, will need a , someone to stay with them for 24 hours and should stay in town for 24 hours (within 45 min of Hospital) post procedure    Patient needs to get pre-op physical completed. If outside  health system will need physical faxed to number [phone]     If you do not get a preop physical, your procedure could be cancelled, patient voiced understanding*    Preop Plan: Debra Mercado, [facility]. Pt understands this is needed within 30 days of procedure.     Does patient have [employer] insurance?:Medicare to start after bday on 7/23/24.     Med Review    Blood thinner -  none  ASA - none  Diabetic - none  Any meds by injection or mouth for weight loss or diabetes- none    Patient Education r/t procedure: galo DACOSTA pre-op nurse will call 1-2 days prior to the procedure.    NPO/Prep:   Miralax bowel prep, pt familiar with this prep and did it at her recent colon in Feb.    Did mention her h/o restless syndrome, was told some medications can aggravate it.     Verbalized understanding of all instructions. All questions answered.     Procedure order placed, message routed to OR

## 2024-05-26 ENCOUNTER — HEALTH MAINTENANCE LETTER (OUTPATIENT)
Age: 64
End: 2024-05-26

## 2024-07-09 ENCOUNTER — PATIENT OUTREACH (OUTPATIENT)
Dept: CARE COORDINATION | Facility: CLINIC | Age: 64
End: 2024-07-09
Payer: MEDICARE

## 2024-07-30 ENCOUNTER — OFFICE VISIT (OUTPATIENT)
Dept: FAMILY MEDICINE | Facility: CLINIC | Age: 64
End: 2024-07-30
Payer: COMMERCIAL

## 2024-07-30 VITALS
TEMPERATURE: 97.6 F | WEIGHT: 151.9 LBS | DIASTOLIC BLOOD PRESSURE: 78 MMHG | BODY MASS INDEX: 26.91 KG/M2 | HEIGHT: 63 IN | OXYGEN SATURATION: 96 % | RESPIRATION RATE: 21 BRPM | SYSTOLIC BLOOD PRESSURE: 126 MMHG | HEART RATE: 55 BPM

## 2024-07-30 DIAGNOSIS — I10 BENIGN ESSENTIAL HYPERTENSION: ICD-10-CM

## 2024-07-30 DIAGNOSIS — Z01.818 PREOP GENERAL PHYSICAL EXAM: Primary | ICD-10-CM

## 2024-07-30 DIAGNOSIS — G25.81 RESTLESS LEGS SYNDROME (RLS): ICD-10-CM

## 2024-07-30 DIAGNOSIS — Z13.220 SCREENING CHOLESTEROL LEVEL: ICD-10-CM

## 2024-07-30 DIAGNOSIS — K63.5 POLYP OF COLON, UNSPECIFIED PART OF COLON, UNSPECIFIED TYPE: ICD-10-CM

## 2024-07-30 DIAGNOSIS — M35.00 SJOGREN'S SYNDROME, WITH UNSPECIFIED ORGAN INVOLVEMENT (H): ICD-10-CM

## 2024-07-30 DIAGNOSIS — F41.9 ANXIETY: ICD-10-CM

## 2024-07-30 LAB
ANION GAP SERPL CALCULATED.3IONS-SCNC: 7 MMOL/L (ref 7–15)
BUN SERPL-MCNC: 16.7 MG/DL (ref 8–23)
CALCIUM SERPL-MCNC: 10.1 MG/DL (ref 8.8–10.4)
CHLORIDE SERPL-SCNC: 104 MMOL/L (ref 98–107)
CHOLEST SERPL-MCNC: 293 MG/DL
CREAT SERPL-MCNC: 0.8 MG/DL (ref 0.51–0.95)
EGFRCR SERPLBLD CKD-EPI 2021: 81 ML/MIN/1.73M2
ERYTHROCYTE [DISTWIDTH] IN BLOOD BY AUTOMATED COUNT: 12.6 % (ref 10–15)
FASTING STATUS PATIENT QL REPORTED: NO
FASTING STATUS PATIENT QL REPORTED: NO
GLUCOSE SERPL-MCNC: 96 MG/DL (ref 70–99)
HCO3 SERPL-SCNC: 29 MMOL/L (ref 22–29)
HCT VFR BLD AUTO: 43.2 % (ref 35–47)
HDLC SERPL-MCNC: 53 MG/DL
HGB BLD-MCNC: 13.7 G/DL (ref 11.7–15.7)
LDLC SERPL CALC-MCNC: 189 MG/DL
MCH RBC QN AUTO: 30.9 PG (ref 26.5–33)
MCHC RBC AUTO-ENTMCNC: 31.7 G/DL (ref 31.5–36.5)
MCV RBC AUTO: 97 FL (ref 78–100)
NONHDLC SERPL-MCNC: 240 MG/DL
PLATELET # BLD AUTO: 236 10E3/UL (ref 150–450)
POTASSIUM SERPL-SCNC: 5.1 MMOL/L (ref 3.4–5.3)
RBC # BLD AUTO: 4.44 10E6/UL (ref 3.8–5.2)
SODIUM SERPL-SCNC: 140 MMOL/L (ref 135–145)
TRIGL SERPL-MCNC: 253 MG/DL
WBC # BLD AUTO: 6.8 10E3/UL (ref 4–11)

## 2024-07-30 PROCEDURE — 36415 COLL VENOUS BLD VENIPUNCTURE: CPT | Performed by: NURSE PRACTITIONER

## 2024-07-30 PROCEDURE — 99214 OFFICE O/P EST MOD 30 MIN: CPT | Performed by: NURSE PRACTITIONER

## 2024-07-30 PROCEDURE — G2211 COMPLEX E/M VISIT ADD ON: HCPCS | Performed by: NURSE PRACTITIONER

## 2024-07-30 PROCEDURE — 80061 LIPID PANEL: CPT | Performed by: NURSE PRACTITIONER

## 2024-07-30 PROCEDURE — 85027 COMPLETE CBC AUTOMATED: CPT | Performed by: NURSE PRACTITIONER

## 2024-07-30 PROCEDURE — 80048 BASIC METABOLIC PNL TOTAL CA: CPT | Performed by: NURSE PRACTITIONER

## 2024-07-30 RX ORDER — METOPROLOL TARTRATE 25 MG/1
25 TABLET, FILM COATED ORAL 2 TIMES DAILY
Qty: 180 TABLET | Refills: 3 | Status: SHIPPED | OUTPATIENT
Start: 2024-07-30

## 2024-07-30 RX ORDER — FLUOXETINE 10 MG/1
10 CAPSULE ORAL DAILY
Qty: 90 CAPSULE | Refills: 3 | Status: SHIPPED | OUTPATIENT
Start: 2024-07-30

## 2024-07-30 ASSESSMENT — PAIN SCALES - GENERAL: PAINLEVEL: NO PAIN (0)

## 2024-07-30 NOTE — PROGRESS NOTES
Preoperative Evaluation  [address]  [address]  [place] [address]  Phone: [phone]  Fax: [phone]  Primary Provider: Debra Mercado NP  Pre-op Performing Provider: Debra Mercado NP  Jul 30, 2024 7/25/2024   Surgical Information   What procedure is being done? Colonoscopy to remove polyp   Facility or Hospital where procedure/surgery will be performed: [facility]    Who is doing the procedure / surgery? It is on file   Date of surgery / procedure: August 22, 2024, colonoscopy to remove polyp   Time of surgery / procedure: 10:30am   Where do you plan to recover after surgery? at home with family        Fax number for surgical facility: Note does not need to be faxed, will be available electronically in Epic.    Assessment & Plan     The proposed surgical procedure is considered INTERMEDIATE risk.    (Z01.818) Preop general physical exam  (primary encounter diagnosis)  Comment:   Plan: CBC with platelets            (K63.5) Polyp of colon, unspecified part of colon, unspecified type  Comment:   Plan:     (F41.9) Anxiety  Comment: stable  Plan: FLUoxetine (PROZAC) 10 MG capsule        The current medical regimen is effective;  continue present plan and medications.     (I10) Benign essential hypertension  Comment: at goal  Plan: metoprolol tartrate (LOPRESSOR) 25 MG tablet,         Basic metabolic panel  (Ca, Cl, CO2, Creat,         Gluc, K, Na, BUN)        The current medical regimen is effective;  continue present plan and medications.     (Z13.220) Screening cholesterol level  Comment:   Plan: Lipid panel reflex to direct LDL Non-fasting            (M35.00) Sjogren's syndrome, with unspecified organ involvement (H24)  Comment: stable  Plan:     (G25.81) Restless legs syndrome (RLS)  Comment: stable  Plan:        The longitudinal plan of care for the diagnosis(es)/condition(s) as documented were addressed during this visit. Due to the  "added complexity in care, I will continue to support Jannet in the subsequent management and with ongoing continuity of care.     - No identified additional risk factors other than previously addressed    Preoperative Medication Instructions  Antiplatelet or Anticoagulation Medication Instructions   - Patient is on no antiplatelet or anticoagulation medications.    Additional Medication Instructions  Take all scheduled medications on the day of surgery    Recommendation  Approval given to proceed with proposed procedure, without further diagnostic evaluation.    Subjective   Jannet is a 64 year old, presenting for the following:  Pre-Op Exam (/DOS: 08/22/2024 Colonoscopy \" removal of pollup \")        HPI related to upcoming procedure: Polyp identified at colonoscopy in February, unable to be removed at that time.         7/25/2024   Pre-Op Questionnaire   Have you ever had a heart attack or stroke? No   Have you ever had surgery on your heart or blood vessels, such as a stent placement, a coronary artery bypass, or surgery on an artery in your head, neck, heart, or legs? No   Do you have chest pain with activity? No   Do you have a history of heart failure? No   Do you currently have a cold, bronchitis or symptoms of other infection? No   Do you have a cough, shortness of breath, or wheezing? No   Do you or anyone in your family have previous history of blood clots? No   Do you or does anyone in your family have a serious bleeding problem such as prolonged bleeding following surgeries or cuts? No   Have you ever had problems with anemia or been told to take iron pills? No   Have you had any abnormal blood loss such as black, tarry or bloody stools, or abnormal vaginal bleeding? No   Have you ever had a blood transfusion? No   Are you willing to have a blood transfusion if it is medically needed before, during, or after your surgery? Yes   Have you or any of your relatives ever had problems with anesthesia? No   Do you have " sleep apnea, excessive snoring or daytime drowsiness? No   Do you have any artifical heart valves or other implanted medical devices like a pacemaker, defibrillator, or continuous glucose monitor? No   Do you have artificial joints? No   Are you allergic to latex? No        Health Care Directive  Patient does not have a Health Care Directive or Living Will:     Preoperative Review of    reviewed - no record of controlled substances prescribed.          Patient Active Problem List    Diagnosis Date Noted    Restless legs syndrome (RLS) 12/02/2019     Priority: Medium    Shortness of breath 10/08/2019     Priority: Medium    Hyperplastic colon polyp 01/30/2019     Priority: Medium     1/2019      Sjogren's syndrome (H24) 05/24/2017     Priority: Medium    Vitamin D deficiency 09/26/2016     Priority: Medium    Amblyopia of right eye 06/26/2014     Priority: Medium    Esotropia, right eye 06/26/2014     Priority: Medium    Dry mouth 12/03/2013     Priority: Medium    Family history of colon cancer 05/22/2012     Priority: Medium     Mother        Osteopenia 06/23/2010     Priority: Medium      Past Medical History:   Diagnosis Date    Diverticulosis of colon 2008    as noted on colonoscopy    Family history of colon cancer 5/22/2012    Family history of colon cancer     Family history of colon cancer     Family history of colon cancer     Family history of colon cancer     Family history of colon cancer     Postmenopausal 2005     Past Surgical History:   Procedure Laterality Date    APPENDECTOMY  1977    COLONOSCOPY  2013    COLONOSCOPY Left 1/22/2019    Procedure: COMBINED COLONOSCOPY, SINGLE OR MULTIPLE BIOPSY/POLYPECTOMY BY BIOPSY;  Surgeon: Olesya Phipps MD;  [facility]    COLONOSCOPY N/A 2/27/2024    Procedure: COLONOSCOPY, WITH POLYPECTOMY;  Surgeon: Helen Ellison MD;  [facility]    ESOPHAGOSCOPY, GASTROSCOPY, DUODENOSCOPY (EGD), COMBINED N/A 4/11/2018    Procedure: COMBINED  ESOPHAGOSCOPY, GASTROSCOPY, DUODENOSCOPY (EGD);  egd;  Surgeon: Poonam Alatorre MD;  [facility]    ESOPHAGOSCOPY, GASTROSCOPY, DUODENOSCOPY (EGD), COMBINED N/A 2018    Procedure: COMBINED ESOPHAGOSCOPY, GASTROSCOPY, DUODENOSCOPY (EGD), BIOPSY SINGLE OR MULTIPLE;;  Surgeon: Poonam Alatorre MD;  [facility]    GYN SURGERY          HC REMOVAL GALLBLADDER      Cholecystectomy    ORTHOPEDIC SURGERY  2016    Broken right radius    SURGICAL HISTORY OF -       c- section     Current Outpatient Medications   Medication Sig Dispense Refill    B Complex-C-Iron (SUPER B-COMPLEX/IRON/VITAMIN C PO)       calcium-vitamin D (OSCAL) 250-125 MG-UNIT TABS per tablet Take 1 tablet by mouth 2 times daily      FLUoxetine (PROZAC) 10 MG capsule TAKE 1 CAPSULE BY MOUTH EVERY DAY 90 capsule 0    loperamide (IMODIUM A-D) 2 MG tablet Take 1 tablet (2 mg) by mouth 4 times daily as needed for diarrhea 120 tablet 1    metoprolol tartrate (LOPRESSOR) 25 MG tablet TAKE 1 TABLET BY MOUTH TWICE A DAY 60 tablet 0    VITAMIN D, CHOLECALCIFEROL, PO Take by mouth daily         Allergies   Allergen Reactions    No Known Drug Allergy     Seasonal Allergies         Social History     Tobacco Use    Smoking status: Former     Current packs/day: 0.00     Average packs/day: 1 pack/day for 10.0 years (10.0 ttl pk-yrs)     Types: Cigarettes     Start date: 2002     Quit date: 2008     Years since quittin.5    Smokeless tobacco: Never    Tobacco comments:     smoked age [age], quit x 1 yr and resumed then quit after one yr   Substance Use Topics    Alcohol use: Yes     Comment: 1-2 per day       History   Drug Use No             Review of Systems  CONSTITUTIONAL: NEGATIVE for fever, chills, change in weight  INTEGUMENTARY/SKIN: NEGATIVE for worrisome rashes, moles or lesions  EYES: NEGATIVE for vision changes or irritation  ENT/MOUTH: NEGATIVE for ear, mouth and throat problems  RESP: NEGATIVE for significant cough  "or SOB  CV: NEGATIVE for chest pain, palpitations or peripheral edema  GI: NEGATIVE for nausea, abdominal pain, heartburn, or change in bowel habits  : NEGATIVE for frequency, dysuria, or hematuria  MUSCULOSKELETAL: NEGATIVE for significant arthralgias or myalgia  NEURO: NEGATIVE for weakness, dizziness or paresthesias  ENDOCRINE: NEGATIVE for temperature intolerance, skin/hair changes  HEME: NEGATIVE for bleeding problems  PSYCHIATRIC: NEGATIVE for changes in mood or affect    Objective    /78 (BP Location: Right arm, Patient Position: Sitting, Cuff Size: Adult Regular)   Pulse 55   Temp 97.6  F (36.4  C) (Temporal)   Resp 21   Ht 1.59 m (5' 2.6\")   Wt 68.9 kg (151 lb 14.4 oz)   LMP 12/18/2004   SpO2 96%   BMI 27.25 kg/m     Estimated body mass index is 27.25 kg/m  as calculated from the following:    Height as of this encounter: 1.59 m (5' 2.6\").    Weight as of this encounter: 68.9 kg (151 lb 14.4 oz).  Physical Exam  GENERAL: alert and no distress  EYES: Eyes grossly normal to inspection, PERRL and conjunctivae and sclerae normal  HENT: ear canals and TM's normal, nose and mouth without ulcers or lesions  NECK: no adenopathy, no asymmetry, masses, or scars  RESP: lungs clear to auscultation - no rales, rhonchi or wheezes  CV: regular rate and rhythm, normal S1 S2, no S3 or S4, no murmur, click or rub, no peripheral edema  ABDOMEN: soft, nontender, no hepatosplenomegaly, no masses and bowel sounds normal  MS: no gross musculoskeletal defects noted, no edema  SKIN: no suspicious lesions or rashes  NEURO: Normal strength and tone, mentation intact and speech normal  PSYCH: mentation appears normal, affect normal/bright    No results for input(s): \"HGB\", \"PLT\", \"INR\", \"NA\", \"POTASSIUM\", \"CR\", \"A1C\" in the last 8760 hours.     Diagnostics  Labs pending at this time.  Results will be reviewed when available.   No EKG required, no history of coronary heart disease, significant arrhythmia, peripheral " arterial disease or other structural heart disease.    Revised Cardiac Risk Index (RCRI)  The patient has the following serious cardiovascular risks for perioperative complications:   - No serious cardiac risks = 0 points     RCRI Interpretation: 0 points: Class I (very low risk - 0.4% complication rate)         Signed Electronically by: Debra Mercado NP  A copy of this evaluation report is provided to the requesting physician.

## 2024-07-30 NOTE — PATIENT INSTRUCTIONS
Anesthesia Post Evaluation    Patient: Kenneth Pardo    Procedure(s) Performed: Procedure(s) (LRB):  COLONOSCOPY (N/A)    Final Anesthesia Type: general    Patient location during evaluation: PACU  Patient participation: Yes- Able to Participate  Level of consciousness: awake and alert and oriented  Post-procedure vital signs: reviewed and stable  Pain management: adequate  Airway patency: patent    PONV status at discharge: No PONV  Anesthetic complications: no      Cardiovascular status: hemodynamically stable  Respiratory status: unassisted, spontaneous ventilation and room air  Hydration status: euvolemic  Follow-up not needed.          Vitals Value Taken Time   BP 88/56 09/17/20 1008   Temp 36.5 °C (97.7 °F) 09/17/20 1015   Pulse 68 09/17/20 1016   Resp 16 09/17/20 1007   SpO2 100 % 09/17/20 1016   Vitals shown include unvalidated device data.      Event Time   Out of Recovery 10:00:00         Pain/Nereida Score: Nereida Score: 10 (9/17/2020 10:00 AM)         How to Take Your Medication Before Surgery  Preoperative Medication Instructions   Antiplatelet or Anticoagulation Medication Instructions   - Patient is on no antiplatelet or anticoagulation medications.    Additional Medication Instructions  Take all scheduled medications on the day of surgery       Patient Education   Preparing for Your Surgery  Getting started  A nurse will call you to review your health history and instructions. They will give you an arrival time based on your scheduled surgery time. Please be ready to share:  Your doctor's clinic name and phone number  Your medical, surgical, and anesthesia history  A list of allergies and sensitivities  A list of medicines, including herbal treatments and over-the-counter drugs  Whether the patient has a legal guardian (ask how to send us the papers in advance)  Please tell us if you're pregnant--or if there's any chance you might be pregnant. Some surgeries may injure a fetus (unborn baby), so they require a pregnancy test. Surgeries that are safe for a fetus don't always need a test, and you can choose whether to have one.   If you have a child who's having surgery, please ask for a copy of Preparing for Your Child's Surgery.    Preparing for surgery  Within 10 to 30 days of surgery: Have a pre-op exam (sometimes called an H&P, or History and Physical). This can be done at a clinic or pre-operative center.  If you're having a , you may not need this exam. Talk to your care team.  At your pre-op exam, talk to your care team about all medicines you take. If you need to stop any medicines before surgery, ask when to start taking them again.  We do this for your safety. Many medicines can make you bleed too much during surgery. Some change how well surgery (anesthesia) drugs work.  Call your insurance company to let them know you're having surgery. (If you don't have insurance, call [phone].)  Call your clinic if there's any change in your health.  This includes signs of a cold or flu (sore throat, runny nose, cough, rash, fever). It also includes a scrape or scratch near the surgery site.  If you have questions on the day of surgery, call your hospital or surgery center.  Eating and drinking guidelines  For your safety: Unless your surgeon tells you otherwise, follow the guidelines below.  Eat and drink as usual until 8 hours before you arrive for surgery. After that, no food or milk.  Drink clear liquids until 2 hours before you arrive. These are liquids you can see through, like water, Gatorade, and Propel Water. They also include plain black coffee and tea (no cream or milk), candy, and breath mints. You can spit out gum when you arrive.  If you drink alcohol: Stop drinking it the night before surgery.  If your care team tells you to take medicine on the morning of surgery, it's okay to take it with a sip of water.  Preventing infection  Shower or bathe the night before and morning of your surgery. Follow the instructions your clinic gave you. (If no instructions, use regular soap.)  Don't shave or clip hair near your surgery site. We'll remove the hair if needed.  Don't smoke or vape the morning of surgery. You may chew nicotine gum up to 2 hours before surgery. A nicotine patch is okay.  Note: Some surgeries require you to completely quit smoking and nicotine. Check with your surgeon.  Your care team will make every effort to keep you safe from infection. We will:  Clean our hands often with soap and water (or an alcohol-based hand rub).  Clean the skin at your surgery site with a special soap that kills germs.  Give you a special gown to keep you warm. (Cold raises the risk of infection.)  Wear special hair covers, masks, gowns and gloves during surgery.  Give antibiotic medicine, if prescribed. Not all surgeries need antibiotics.  What to bring on the day of surgery  Photo ID and insurance card  Copy of your health care directive, if you have  one  Glasses and hearing aids (bring cases)  You can't wear contacts during surgery  Inhaler and eye drops, if you use them (tell us about these when you arrive)  CPAP machine or breathing device, if you use them  A few personal items, if spending the night  If you have . . .  A pacemaker, ICD (cardiac defibrillator) or other implant: Bring the ID card.  An implanted stimulator: Bring the remote control.  A legal guardian: Bring a copy of the certified (court-stamped) guardianship papers.  Please remove any jewelry, including body piercings. Leave jewelry and other valuables at home.  If you're going home the day of surgery  You must have a responsible adult drive you home. They should stay with you overnight as well.  If you don't have someone to stay with you, and you aren't safe to go home alone, we may keep you overnight. Insurance often won't pay for this.  After surgery  If it's hard to control your pain or you need more pain medicine, please call your surgeon's office.  Questions?   If you have any questions for your care team, list them here: _________________________________________________________________________________________________________________________________________________________________________ ____________________________________ ____________________________________ ____________________________________  For informational purposes only. Not to replace the advice of your health care provider. Copyright   2003, 2019 [facility]. All rights reserved. Clinically reviewed by Kristin Ni MD. Kormeli [id number] - REV 12/22.

## 2024-07-30 NOTE — NURSING NOTE
Pcv 20 pt decline vaccine. Admin history 7/30/2024, for pcv 20 information deleted.   MOISÉS SHETH.

## 2024-08-15 ENCOUNTER — PATIENT OUTREACH (OUTPATIENT)
Dept: GASTROENTEROLOGY | Facility: CLINIC | Age: 64
End: 2024-08-15
Payer: COMMERCIAL

## 2024-08-15 NOTE — TELEPHONE ENCOUNTER
Pt called with question about arrival time for 8/22 procedure and bowel prep. [facility], she does have that information now and will review in prep. Pre op exam completed 7/30. Questions answered.    Jackie Gonzalez, RN, BSN,   [facility]  Care coordinator

## 2024-08-20 ENCOUNTER — TELEPHONE (OUTPATIENT)
Dept: GASTROENTEROLOGY | Facility: CLINIC | Age: 64
End: 2024-08-20
Payer: COMMERCIAL

## 2024-08-20 NOTE — TELEPHONE ENCOUNTER
Called pt to inform her that the arrival time for her procedure has changed from 10:30 to 10:00am. Pt understood and confirmed 10:00am arrival time.

## 2024-08-22 ENCOUNTER — ANESTHESIA EVENT (OUTPATIENT)
Dept: GASTROENTEROLOGY | Facility: CLINIC | Age: 64
End: 2024-08-22
Payer: COMMERCIAL

## 2024-08-22 ENCOUNTER — ANESTHESIA (OUTPATIENT)
Dept: GASTROENTEROLOGY | Facility: CLINIC | Age: 64
End: 2024-08-22
Payer: COMMERCIAL

## 2024-08-22 ENCOUNTER — HOSPITAL ENCOUNTER (OUTPATIENT)
Facility: CLINIC | Age: 64
Discharge: HOME | End: 2024-08-22
Attending: INTERNAL MEDICINE | Admitting: INTERNAL MEDICINE
Payer: COMMERCIAL

## 2024-08-22 VITALS
DIASTOLIC BLOOD PRESSURE: 77 MMHG | OXYGEN SATURATION: 97 % | HEART RATE: 64 BPM | BODY MASS INDEX: 27.09 KG/M2 | WEIGHT: 151 LBS | RESPIRATION RATE: 26 BRPM | SYSTOLIC BLOOD PRESSURE: 148 MMHG

## 2024-08-22 LAB — COLONOSCOPY: NORMAL

## 2024-08-22 PROCEDURE — 45385 COLONOSCOPY W/LESION REMOVAL: CPT | Performed by: NURSE ANESTHETIST, CERTIFIED REGISTERED

## 2024-08-22 PROCEDURE — 45390 COLONOSCOPY W/RESECTION: CPT | Performed by: INTERNAL MEDICINE

## 2024-08-22 PROCEDURE — 88305 TISSUE EXAM BY PATHOLOGIST: CPT | Mod: TC | Performed by: INTERNAL MEDICINE

## 2024-08-22 PROCEDURE — 370N000017 HC ANESTHESIA TECHNICAL FEE, PER MIN: Performed by: INTERNAL MEDICINE

## 2024-08-22 PROCEDURE — 250N000009 HC RX 250: Performed by: NURSE ANESTHETIST, CERTIFIED REGISTERED

## 2024-08-22 PROCEDURE — 272N000104 HC DEVICE CLIP RESOLUTION, EACH: Performed by: INTERNAL MEDICINE

## 2024-08-22 PROCEDURE — 258N000003 HC RX IP 258 OP 636: Performed by: NURSE ANESTHETIST, CERTIFIED REGISTERED

## 2024-08-22 PROCEDURE — 999N000010 HC STATISTIC ANES STAT CODE-CRNA PER MINUTE: Performed by: INTERNAL MEDICINE

## 2024-08-22 PROCEDURE — 250N000011 HC RX IP 250 OP 636: Performed by: NURSE ANESTHETIST, CERTIFIED REGISTERED

## 2024-08-22 PROCEDURE — 45385 COLONOSCOPY W/LESION REMOVAL: CPT | Performed by: ANESTHESIOLOGY

## 2024-08-22 PROCEDURE — 250N000009 HC RX 250: Performed by: INTERNAL MEDICINE

## 2024-08-22 RX ORDER — ONDANSETRON 2 MG/ML
4 INJECTION INTRAMUSCULAR; INTRAVENOUS
Status: DISCONTINUED | OUTPATIENT
Start: 2024-08-22 | End: 2024-08-22 | Stop reason: HOSPADM

## 2024-08-22 RX ORDER — LIDOCAINE HYDROCHLORIDE 20 MG/ML
INJECTION, SOLUTION INFILTRATION; PERINEURAL PRN
Status: DISCONTINUED | OUTPATIENT
Start: 2024-08-22 | End: 2024-08-22

## 2024-08-22 RX ORDER — PROPOFOL 10 MG/ML
INJECTION, EMULSION INTRAVENOUS PRN
Status: DISCONTINUED | OUTPATIENT
Start: 2024-08-22 | End: 2024-08-22

## 2024-08-22 RX ORDER — PROPOFOL 10 MG/ML
INJECTION, EMULSION INTRAVENOUS CONTINUOUS PRN
Status: DISCONTINUED | OUTPATIENT
Start: 2024-08-22 | End: 2024-08-22

## 2024-08-22 RX ORDER — LIDOCAINE 40 MG/G
CREAM TOPICAL
Status: DISCONTINUED | OUTPATIENT
Start: 2024-08-22 | End: 2024-08-22 | Stop reason: HOSPADM

## 2024-08-22 RX ORDER — DEXMEDETOMIDINE HYDROCHLORIDE 4 UG/ML
INJECTION, SOLUTION INTRAVENOUS PRN
Status: DISCONTINUED | OUTPATIENT
Start: 2024-08-22 | End: 2024-08-22

## 2024-08-22 RX ORDER — SODIUM CHLORIDE, SODIUM LACTATE, POTASSIUM CHLORIDE, CALCIUM CHLORIDE 600; 310; 30; 20 MG/100ML; MG/100ML; MG/100ML; MG/100ML
INJECTION, SOLUTION INTRAVENOUS CONTINUOUS PRN
Status: DISCONTINUED | OUTPATIENT
Start: 2024-08-22 | End: 2024-08-22

## 2024-08-22 RX ORDER — ONDANSETRON 2 MG/ML
INJECTION INTRAMUSCULAR; INTRAVENOUS PRN
Status: DISCONTINUED | OUTPATIENT
Start: 2024-08-22 | End: 2024-08-22

## 2024-08-22 RX ADMIN — LIDOCAINE HYDROCHLORIDE 60 MG: 20 INJECTION, SOLUTION INFILTRATION; PERINEURAL at 11:07

## 2024-08-22 RX ADMIN — SODIUM CHLORIDE, POTASSIUM CHLORIDE, SODIUM LACTATE AND CALCIUM CHLORIDE: 600; 310; 30; 20 INJECTION, SOLUTION INTRAVENOUS at 11:04

## 2024-08-22 RX ADMIN — PROPOFOL 200 MCG/KG/MIN: 10 INJECTION, EMULSION INTRAVENOUS at 11:07

## 2024-08-22 RX ADMIN — PROPOFOL 20 MG: 10 INJECTION, EMULSION INTRAVENOUS at 11:13

## 2024-08-22 RX ADMIN — ONDANSETRON 4 MG: 2 INJECTION INTRAMUSCULAR; INTRAVENOUS at 11:14

## 2024-08-22 RX ADMIN — PROPOFOL 10 MG: 10 INJECTION, EMULSION INTRAVENOUS at 11:16

## 2024-08-22 RX ADMIN — DEXMEDETOMIDINE HYDROCHLORIDE 12 MCG: 200 INJECTION INTRAVENOUS at 11:29

## 2024-08-22 RX ADMIN — PROPOFOL 20 MG: 10 INJECTION, EMULSION INTRAVENOUS at 11:10

## 2024-08-22 ASSESSMENT — ACTIVITIES OF DAILY LIVING (ADL)
ADLS_ACUITY_SCORE: 35

## 2024-08-22 ASSESSMENT — LIFESTYLE VARIABLES: TOBACCO_USE: 1

## 2024-08-22 NOTE — ANESTHESIA PREPROCEDURE EVALUATION
Anesthesia Pre-Procedure Evaluation    Patient: Sejal Devlin   MRN: [id number] : 1960        Procedure : Procedure(s):  COLONOSCOPY, WITH ENDOSCOPIC MUCOSAL RESECTION          Past Medical History:   Diagnosis Date    Diverticulosis of colon     as noted on colonoscopy    Family history of colon cancer 2012    Family history of colon cancer     Family history of colon cancer     Family history of colon cancer     Family history of colon cancer     Family history of colon cancer     Hypertension     Postmenopausal       Past Surgical History:   Procedure Laterality Date    APPENDECTOMY      COLONOSCOPY  2013    COLONOSCOPY Left 2019    Procedure: COMBINED COLONOSCOPY, SINGLE OR MULTIPLE BIOPSY/POLYPECTOMY BY BIOPSY;  Surgeon: Olesya Phipps MD;  Location: [facility]    COLONOSCOPY N/A 2024    Procedure: COLONOSCOPY, WITH POLYPECTOMY;  Surgeon: Helen Ellison MD;  Location: [facility]    ESOPHAGOSCOPY, GASTROSCOPY, DUODENOSCOPY (EGD), COMBINED N/A 2018    Procedure: COMBINED ESOPHAGOSCOPY, GASTROSCOPY, DUODENOSCOPY (EGD);  egd;  Surgeon: Poonam Alatorre MD;  Location: [facility]    ESOPHAGOSCOPY, GASTROSCOPY, DUODENOSCOPY (EGD), COMBINED N/A 2018    Procedure: COMBINED ESOPHAGOSCOPY, GASTROSCOPY, DUODENOSCOPY (EGD), BIOPSY SINGLE OR MULTIPLE;;  Surgeon: Poonam Alatorre MD;  Location: [facility] OR    GYN SURGERY          HC REMOVAL GALLBLADDER      Cholecystectomy    ORTHOPEDIC SURGERY      Broken right radius    SURGICAL HISTORY OF -       c- section      Allergies   Allergen Reactions    No Known Drug Allergy     Seasonal Allergies       Social History     Tobacco Use    Smoking status: Former     Current packs/day: 0.00     Average packs/day: 1 pack/day for 10.0 years (10.0 ttl pk-yrs)     Types: Cigarettes     Start date: 2002     Quit date: 2008     Years since quittin.6    Smokeless tobacco: Never    Tobacco comments:     smoked age  "[age], quit x 1 yr and resumed then quit after one yr   Substance Use Topics    Alcohol use: Yes     Comment: 1-2 per day      Wt Readings from Last 1 Encounters:   08/22/24 68.5 kg (151 lb)        Anesthesia Evaluation            ROS/MED HX  ENT/Pulmonary:     (+)                tobacco use, Past use,                       Neurologic:       Cardiovascular:     (+)  hypertension- -   -  - -                                      METS/Exercise Tolerance: >4 METS    Hematologic:       Musculoskeletal: Comment: Restless legs syndrome      GI/Hepatic:       Renal/Genitourinary:       Endo:       Psychiatric/Substance Use:     (+) psychiatric history anxiety       Infectious Disease:       Malignancy:       Other:            Physical Exam    Airway        Mallampati: II   TM distance: > 3 FB   Neck ROM: full   Mouth opening: > 3 cm    Respiratory Devices and Support         Dental  no notable dental history         Cardiovascular          Rhythm and rate: regular and normal     Pulmonary   pulmonary exam normal                OUTSIDE LABS:  CBC:   Lab Results   Component Value Date    WBC 6.8 07/30/2024    WBC 5.0 09/08/2021    HGB 13.7 07/30/2024    HGB 14.0 09/08/2021    HCT 43.2 07/30/2024    HCT 43.2 09/08/2021     07/30/2024     09/08/2021     BMP:   Lab Results   Component Value Date     07/30/2024     01/11/2023    POTASSIUM 5.1 07/30/2024    POTASSIUM 5.1 01/11/2023    CHLORIDE 104 07/30/2024    CHLORIDE 102 01/11/2023    CO2 29 07/30/2024    CO2 25 01/11/2023    BUN 16.7 07/30/2024    BUN 15.6 01/11/2023    CR 0.80 07/30/2024    CR 0.79 01/11/2023    GLC 96 07/30/2024     (H) 01/11/2023     COAGS: No results found for: \"PTT\", \"INR\", \"FIBR\"  POC: No results found for: \"BGM\", \"HCG\", \"HCGS\"  HEPATIC:   Lab Results   Component Value Date    ALBUMIN 3.8 09/08/2021    PROTTOTAL 7.2 09/08/2021    ALT 13 09/08/2021    AST 16 09/08/2021    ALKPHOS 74 09/08/2021    BILITOTAL 0.5 09/08/2021 " "    OTHER:   Lab Results   Component Value Date    A1C 5.7 03/05/2014    RICK 10.1 07/30/2024    PHOS 3.3 06/23/2010    TSH 1.550 11/11/2019    CRP <2.9 08/29/2014    SED 11 08/29/2014       Anesthesia Plan    ASA Status:  2    NPO Status:  NPO Appropriate    Anesthesia Type: MAC.     - Reason for MAC: straight local not clinically adequate   Induction: N/a.   Maintenance: TIVA.        Consents    Anesthesia Plan(s) and associated risks, benefits, and realistic alternatives discussed. Questions answered and patient/representative(s) expressed understanding.     - Discussed:     - Discussed with:  Patient            Postoperative Care    Pain management: IV analgesics.   PONV prophylaxis: Ondansetron (or other 5HT-3)     Comments:               Rogers Payne MD    I have reviewed the pertinent notes and labs in the chart from the past 30 days and (re)examined the patient.  Any updates or changes from those notes are reflected in this note.              # Overweight: Estimated body mass index is 27.09 kg/m  as calculated from the following:    Height as of 7/30/24: 1.59 m (5' 2.6\").    Weight as of this encounter: 68.5 kg (151 lb).      "

## 2024-08-22 NOTE — ANESTHESIA POSTPROCEDURE EVALUATION
Patient: Sejal Devlin    Procedure: Procedure(s):  COLONOSCOPY, WITH ENDOSCOPIC MUCOSAL RESECTION       Anesthesia Type:  MAC    Note:  Disposition: Outpatient   Postop Pain Control: Uneventful            Sign Out: Well controlled pain   PONV: No   Neuro/Psych: Uneventful            Sign Out: Acceptable/Baseline neuro status   Airway/Respiratory: Uneventful            Sign Out: Acceptable/Baseline resp. status   CV/Hemodynamics: Uneventful            Sign Out: Acceptable CV status   Other NRE: NONE   DID A NON-ROUTINE EVENT OCCUR? No           Last vitals:  Vitals Value Taken Time   /77 08/22/24 1200   Temp     Pulse 64 08/22/24 1200   Resp 24 08/22/24 1206   SpO2 97 % 08/22/24 1206   Vitals shown include unfiled device data.    Electronically Signed By: Rogers Payne MD  August 22, 2024  1:36 PM

## 2024-08-22 NOTE — ANESTHESIA CARE TRANSFER NOTE
Patient: Sejal Devlin    Procedure: Procedure(s):  COLONOSCOPY, WITH ENDOSCOPIC MUCOSAL RESECTION       Diagnosis: Cecal polyp [K63.5]  Diagnosis Additional Information: No value filed.    Anesthesia Type:   No value filed.     Note:    Oropharynx: oropharynx clear of all foreign objects and spontaneously breathing  Level of Consciousness: awake  Oxygen Supplementation: room air    Independent Airway: airway patency satisfactory and stable  Dentition: dentition unchanged  Vital Signs Stable: post-procedure vital signs reviewed and stable  Report to RN Given: handoff report given  Patient transferred to: Phase II  Comments: Pt to ENDO Phase 2 on room air, airway patent, VSS. Report to RN.  Handoff Report: Identifed the Patient, Identified the Reponsible Provider, Reviewed the pertinent medical history, Discussed the surgical course, Reviewed Intra-OP anesthesia mangement and issues during anesthesia, Set expectations for post-procedure period and Allowed opportunity for questions and acknowledgement of understanding  Vitals:  Vitals Value Taken Time   BP     Temp     Pulse     Resp     SpO2         Electronically Signed By: JONATAN Becerra CRNA  August 22, 2024  11:48 AM

## 2024-08-23 PROCEDURE — 88305 TISSUE EXAM BY PATHOLOGIST: CPT | Mod: 26 | Performed by: PATHOLOGY

## 2024-08-27 NOTE — RESULT ENCOUNTER NOTE
Pathology from colonoscopy reviewed. One advanced adenoma removed. Recommend repeat colonoscopy in 3 years for surveillance. Can be with any provider.    Robles Longoria MD  [facility]  Division of Gastroenterology and Hepatology  [facility] [address], [place] [address]

## 2024-09-05 ENCOUNTER — PATIENT OUTREACH (OUTPATIENT)
Dept: GASTROENTEROLOGY | Facility: CLINIC | Age: 64
End: 2024-09-05
Payer: COMMERCIAL

## 2024-10-01 NOTE — PROGRESS NOTES
[facility] Dermatology Note  Encounter Date: Mar 16, 2023  Office Visit     Dermatology Problem List:  1. Inflamed seborrheic keratosis   ____________________________________________    Assessment & Plan:    # Inflamed seborrheic keratosis, right eyebrow  - Patient would like to defer treatment at this time because she has a photo shoot latera this week, may consider in future     Procedures Performed:   None.    Follow-up: prn for new or changing lesions    Staff and Scribe:     Scribe Disclosure:  I, Marielena Alba, am serving as a scribe to document services personally performed by Bernadette Sierra PA-C based on data collection and the provider's statements to me.   Provider Disclosure:   The documentation recorded by the scribe accurately reflects the services I personally performed and the decisions made by me.    All risks, benefits and alternatives were discussed with patient.  Patient is in agreement and understands the assessment and plan.  All questions were answered.    Bernadette Sierra PA-C, MPAS  [facility] [place]: Phone: [phone], Fax: [phone]  [facility]: Phone: [phone],  Fax: [phone]  [facility]: Phone: [phone], Fax: [phone]    ____________________________________________    CC: Derm Problem (Concerned about bump on R eye brow present for years no pain but some itching at times)    HPI:  Ms. Sejal Devlin is a(n) 62 year old female who presents today as a new patient for Spot check. Patient reports a bump that has been present for years. It grew back and it is itchy.     Patient is otherwise feeling well, without additional skin concerns.    Labs Reviewed:  N/A    Physical Exam:  Vitals: LMP 12/18/2004   SKIN: Focused examination of right eyebrow was performed.  - There is a tan to brown waxy stuck on papule with surrounding erythema on the right  Last Clinic Visit: 8/14/2024  [facility] [place]    Aspirin, Omeprazole and Advair inhaler: passed medication protocol  - refills sent      eyebrow.   - No other lesions of concern on areas examined.     Medications:  Current Outpatient Medications   Medication     B Complex-C-Iron (SUPER B-COMPLEX/IRON/VITAMIN C PO)     calcium-vitamin D (OSCAL) 250-125 MG-UNIT TABS per tablet     FLUoxetine (PROZAC) 10 MG capsule     loperamide (IMODIUM A-D) 2 MG tablet     LORazepam (ATIVAN) 1 MG tablet     metoprolol tartrate (LOPRESSOR) 25 MG tablet     VITAMIN D, CHOLECALCIFEROL, PO     No current facility-administered medications for this visit.      Past Medical History:   Patient Active Problem List   Diagnosis     Osteopenia     Family history of colon cancer     Dry mouth     Vitamin D deficiency     Sjogren's syndrome (H)     Hyperplastic colon polyp     Shortness of breath     Restless legs syndrome (RLS)     Amblyopia of right eye     Esotropia, right eye     Past Medical History:   Diagnosis Date     Diverticulosis of colon 2008    as noted on colonoscopy     Family history of colon cancer 5/22/2012     Family history of colon cancer      Family history of colon cancer      Family history of colon cancer      Family history of colon cancer      Family history of colon cancer      Postmenopausal 2005

## 2024-10-13 ENCOUNTER — HEALTH MAINTENANCE LETTER (OUTPATIENT)
Age: 64
End: 2024-10-13

## 2025-01-09 ENCOUNTER — OFFICE VISIT (OUTPATIENT)
Dept: INTERNAL MEDICINE | Facility: CLINIC | Age: 65
End: 2025-01-09
Payer: COMMERCIAL

## 2025-01-09 VITALS
RESPIRATION RATE: 22 BRPM | HEART RATE: 70 BPM | TEMPERATURE: 97.1 F | HEIGHT: 63 IN | SYSTOLIC BLOOD PRESSURE: 138 MMHG | WEIGHT: 149 LBS | DIASTOLIC BLOOD PRESSURE: 78 MMHG | BODY MASS INDEX: 26.4 KG/M2 | OXYGEN SATURATION: 98 %

## 2025-01-09 DIAGNOSIS — M54.2 NECK PAIN: Primary | ICD-10-CM

## 2025-01-09 DIAGNOSIS — M62.838 MUSCLE SPASM: ICD-10-CM

## 2025-01-09 RX ORDER — TIZANIDINE 2 MG/1
2-4 TABLET ORAL 3 TIMES DAILY PRN
Qty: 20 TABLET | Refills: 0 | Status: SHIPPED | OUTPATIENT
Start: 2025-01-09

## 2025-01-09 NOTE — PROGRESS NOTES
"  Assessment & Plan     Neck pain  Muscle spasm  Has been having right-sided neck pain for around 10 days now.  Has completed methylprednisolone course.  Does endorse improvement of symptoms over time.  Did not feel much relief with Robaxin muscle relaxant, willing to proceed with a trial of a different muscle relaxant.  Tizanidine medication and side effect profile discussed with the patient.  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  Currently, does not endorse upper extremity paresthesia/weakness/pain.  Encouraged to work with gentle stretching exercises and patient would like to proceed with physical therapy referral for the same.  Will also be working on getting a stand-up desk for work.  - tiZANidine (ZANAFLEX) 2 MG tablet; Take 1-2 tablets (2-4 mg) by mouth 3 times daily as needed for muscle spasms.  - Physical Therapy  Referral; Future            BMI  Estimated body mass index is 26.82 kg/m  as calculated from the following:    Height as of this encounter: 1.588 m (5' 2.5\").    Weight as of this encounter: 67.6 kg (149 lb).             Subjective   Jannet is a 64 year old, presenting for the following health issues:  Neck Pain        1/9/2025     9:18 AM   Additional Questions   Roomed by Dicki   Accompanied by Daughter      History of Present Illness       Reason for visit:  Severe radiating neck pain that does not nate with otc meds, hot hot/cold applications  Symptom onset:  1-3 days ago  Symptoms include:  Severe radiating neck pain that does not nate with otc meds, hot hot/cold applications  Symptom intensity:  Severe  Symptom progression:  Staying the same  Had these symptoms before:  Yes  What makes it worse:  No  What makes it better:  No   She is taking medications regularly.                 Review of Systems  Constitutional, HEENT, cardiovascular, pulmonary, gi and gu systems " "are negative, except as otherwise noted.      Objective    /78 (BP Location: Right arm, Patient Position: Sitting, Cuff Size: Adult Regular)   Pulse 70   Temp 97.1  F (36.2  C) (Tympanic)   Resp 22   Ht 1.588 m (5' 2.5\")   Wt 67.6 kg (149 lb)   LMP 12/18/2004   SpO2 98%   BMI 26.82 kg/m    Body mass index is 26.82 kg/m .  Physical Exam   GENERAL: alert and no distress  RESP: lungs clear to auscultation - no rales, rhonchi or wheezes  CV: regular rate and rhythm, normal S1 S2  MS: no gross musculoskeletal defects noted, no edema  NEURO: Normal strength and tone, mentation intact and speech normal  PSYCH: mentation appears normal, affect normal.            Signed Electronically by: Capri Marte MD    "

## 2025-01-11 ASSESSMENT — ACTIVITIES OF DAILY LIVING (ADL)
WHEN_LYING_ON_THE_INVOLVED_SIDE: 9
REACHING_FOR_SOMETHING_ON_A_HIGH_SHELF: 9
PUTTING_ON_YOUR_PANTS: 7
PUSHING_WITH_THE_INVOLVED_ARM: 5
WASHING_YOUR_HAIR?: 7
REMOVING_SOMETHING_FROM_YOUR_BACK_POCKET: 7
AT_ITS_WORST?: 9
CARRYING_A_HEAVY_OBJECT_OF_10_POUNDS: 7
TOUCHING_THE_BACK_OF_YOUR_NECK: 9
PUTTING_ON_AN_UNDERSHIRT_OR_A_PULLOVER_SWEATER: 7
WASHING_YOUR_BACK: 7
PLEASE_INDICATE_YOR_PRIMARY_REASON_FOR_REFERRAL_TO_THERAPY:: SHOULDER
PUTTING_ON_A_SHIRT_THAT_BUTTONS_DOWN_THE_FRONT: 7
PLACING_AN_OBJECT_ON_A_HIGH_SHELF: 7

## 2025-01-13 ENCOUNTER — THERAPY VISIT (OUTPATIENT)
Dept: PHYSICAL THERAPY | Facility: CLINIC | Age: 65
End: 2025-01-13
Attending: INTERNAL MEDICINE
Payer: COMMERCIAL

## 2025-01-13 DIAGNOSIS — M62.838 MUSCLE SPASM: ICD-10-CM

## 2025-01-13 PROCEDURE — 97110 THERAPEUTIC EXERCISES: CPT | Mod: GP

## 2025-01-13 PROCEDURE — 97161 PT EVAL LOW COMPLEX 20 MIN: CPT | Mod: GP

## 2025-01-13 NOTE — PROGRESS NOTES
PHYSICAL THERAPY EVALUATION  Type of Visit: Evaluation       Fall Risk Screen:  Fall screen completed by: PT  Have you fallen 2 or more times in the past year?: No  Have you fallen and had an injury in the past year?: No  Is patient a fall risk?: No    Subjective         Presenting condition or subjective complaint: This is a neck issue, not a shoulder issue, but neck was not on the menu!  I have radiating pain  on the right side on my neck to the base of my skull.    Patient reports that this is the third time that this injury had happened over the last 5 years. She reports that she has noticed resolution of her symptoms and now it is a stiffness and some remaining discomfort. She reports that she has some radiating symptoms from her suboccipital area to the base of the neck. She reports that she noticed onset of symptoms after carrying some pails at a strange angle. She reports that she has tried muscle relaxers and prednisone which hasn't made much of a difference. She reports that massages and very hot showers for temporary relief. She reports that has to be conscious of the posture she is using at the computer. She reports that she is struggling significantly with sleeping. She reports that she has a massage pillow that does decrease symptoms a little bit.    Goal: prevention of future aggravation, learn some stretches    Date of onset: 01/09/24 (order date)    Relevant medical history:     Past Medical History:   Diagnosis Date    Diverticulosis of colon 2008    as noted on colonoscopy    Family history of colon cancer 05/22/2012    Family history of colon cancer     Family history of colon cancer     Family history of colon cancer     Family history of colon cancer     Family history of colon cancer     Hypertension     Postmenopausal 2005      Dates & types of surgery:    Past Surgical History:   Procedure Laterality Date    APPENDECTOMY  1977    COLONOSCOPY  2013    COLONOSCOPY Left 1/22/2019    Procedure:  COMBINED COLONOSCOPY, SINGLE OR MULTIPLE BIOPSY/POLYPECTOMY BY BIOPSY;  Surgeon: Olesya Phipps MD;  Location: [facility]    COLONOSCOPY N/A 2024    Procedure: COLONOSCOPY, WITH POLYPECTOMY;  Surgeon: Helen Ellison MD;  Location: [facility] OR    ESOPHAGOSCOPY, GASTROSCOPY, DUODENOSCOPY (EGD), COMBINED N/A 2018    Procedure: COMBINED ESOPHAGOSCOPY, GASTROSCOPY, DUODENOSCOPY (EGD);  egd;  Surgeon: Poonam Alatorre MD;  Location: [facility]    ESOPHAGOSCOPY, GASTROSCOPY, DUODENOSCOPY (EGD), COMBINED N/A 2018    Procedure: COMBINED ESOPHAGOSCOPY, GASTROSCOPY, DUODENOSCOPY (EGD), BIOPSY SINGLE OR MULTIPLE;;  Surgeon: Poonam Alatorre MD;  Location: [facility]    GYN SURGERY          HC REMOVAL GALLBLADDER      Cholecystectomy    ORTHOPEDIC SURGERY      Broken right radius    SURGICAL HISTORY OF -       c- section      Prior diagnostic imaging/testing results:       Prior therapy history for the same diagnosis, illness or injury: No      Prior Level of Function  Transfers: Independent  Ambulation: Independent  ADL: Independent  IADL:     Living Environment  Social support: With a significant other or spouse   Type of home: House   Stairs to enter the home: Yes 12 Is there a railing: Yes     Ramp: No   Stairs inside the home: Yes 12 Is there a railing: Yes     Help at home: None  Equipment owned:       Employment: Yes   Hobbies/Interests: Yessica, reading.    Patient goals for therapy: Sleep in comfort, go about my day in comfort.    Pain assessment: Pain present  Location: base of skull/Rating: 3/10     Objective   CERVICAL SPINE EVALUATION  PAIN: Pain Level at Rest: 3/10  Pain Level with Use: 8/10  Pain Location: cervical spine  Pain Quality: Dull and radiating down to base of neck  Pain Frequency: constant  Pain is Worst: all day  Pain is Exacerbated By: sitting at computer  Pain is Relieved By: heat, otc medications, and rest  Pain Progression: Improved    INTEGUMENTARY  (edema, incisions):   POSTURE: Standing Posture: Rounded shoulders, Forward head, Thoracic kyphosis increased  Sitting Posture: Rounded shoulders, Forward head, Thoracic kyphosis increased  GAIT:   Weightbearing Status: WBAT  Assistive Device(s): None  Gait Deviations: WNL  BALANCE/PROPRIOCEPTION:   WEIGHTBEARING ALIGNMENT:   ROM:   (Degrees) Left AROM Right AROM    Cervical Flexion 50 degrees, pain free    Cervical Extension 50 degrees, pain free    Cervical Side bend 30 degrees, pain 50 degrees, pain free    Cervical Rotation 35 degrees, pain free 55 degrees, pain    Thoracic Flexion WNL, pain    Thoracic Extension WNL, pain free    Thoracic Rotation WNL, pain free WNL, pain free     Left AROM Left PROM Right AROM Right PROM   Shoulder Flexion WNL, pain free  WNL, pain free    Shoulder Abduction WNL, pain free  WNL, pain free    Shoulder IR WNL, pain free  WNL, pain free    Shoulder ER WNL, pain free  WNL, pain free        MYOTOMES:    Left Right   C4 (Shrug) 5, + (mild) 5, + (mild)   C5 (Deltoid) 4, - (none) 4, - (none)   C6 (Biceps) 4+, - (none) 4+, - (none)   C7 (Triceps) 4+, - (none) 4+, - (none)     DTR S:   CORD SIGNS:   DERMATOMES:   NEURAL TENSION:   FLEXIBILITY:    SPECIAL TESTS:    Left Right   Alar Ligament    Cervical Flexion-Rotation     Cervical Rot/Lateral Flex     Compression     Distraction Positive Positive   Spurling s     Thoracic Outlet Screen (NieshaYolanda aguilar)     Transverse Ligament     Vertebral Artery     Cotton Roll Test     Craniocervical Flexor Endurance Test     Mannheimer Test            PALPATION:   + Tenderness At Location Left Right   Sternocleidomastoid     Scalenes     Rhomboids     Facet - +   Upper Trap + +   Levator + +   Erector Spinae + +   Suboccipitals + +     SPINAL SEGMENTAL CONCLUSIONS:  hypomobile throughout with central PA at all segments, no pain with lateral glide from L to R, pain with lateral glide from R to L      Assessment & Plan   CLINICAL IMPRESSIONS  Medical  Diagnosis: Muscle spasm    Treatment Diagnosis: cervicogenic headache, neck pain   Impression/Assessment: Patient is a 64 year old female with neck pain complaints.  The following significant findings have been identified: Pain, Decreased ROM/flexibility, Decreased proprioception, Impaired muscle performance, Decreased activity tolerance, and Impaired posture. These impairments interfere with their ability to perform self care tasks, work tasks, recreational activities, household mobility, and community mobility as compared to previous level of function.     Clinical Decision Making (Complexity):  Clinical Presentation: Stable/Uncomplicated  Clinical Presentation Rationale: based on medical and personal factors listed in PT evaluation  Clinical Decision Making (Complexity): Low complexity    PLAN OF CARE  Treatment Interventions:  Interventions: Manual Therapy, Neuromuscular Re-education, Therapeutic Activity, Therapeutic Exercise, Self-Care/Home Management    Long Term Goals     PT Goal 1  Goal Identifier: LTG1  Goal Description: Patient will report ability to sleep through the night for 5 consecutive days without waking due to pain.  Rationale: to maximize safety and independence with performance of ADLs and functional tasks;to maximize safety and independence within the home;to maximize safety and independence with self cares  Target Date: 04/12/25  PT Goal 2  Goal Identifier: LTG2  Goal Description: Patient will demonstrate appropriate posture throughout 40 minute PT visit with minimal cuing from therapist.  Rationale: to maximize safety and independence with performance of ADLs and functional tasks;to maximize safety and independence within the home;to maximize safety and independence within the community;to maximize safety and independence with self cares  Target Date: 04/12/25      Frequency of Treatment: 1x every other week for 12 weeks  Duration of Treatment: 12 weeks    Recommended Referrals to Other  Professionals:   Education Assessment:   Learner/Method: Patient    Risks and benefits of evaluation/treatment have been explained.   Patient/Family/caregiver agrees with Plan of Care.     Evaluation Time:     PT Eval, Low Complexity Minutes (29606): 20       Signing Clinician: Mague Cruz, PT        [facility]                                                                                   OUTPATIENT PHYSICAL THERAPY      PLAN OF TREATMENT FOR OUTPATIENT REHABILITATION   Patient's Last Name, First Name, Sejal Coronado YOB: 1960   Provider's Name   [facility]   Medical Record No.  [id number]     Onset Date: 01/09/24 (order date)  Start of Care Date: 01/13/25     Medical Diagnosis:  Muscle spasm      PT Treatment Diagnosis:  cervicogenic headache, neck pain Plan of Treatment  Frequency/Duration: 1x every other week for 12 weeks/ 12 weeks    Certification date from 01/13/25 to 04/12/25         See note for plan of treatment details and functional goals     Mague Cruz, PT                         I CERTIFY THE NEED FOR THESE SERVICES FURNISHED UNDER        THIS PLAN OF TREATMENT AND WHILE UNDER MY CARE     (Physician attestation of this document indicates review and certification of the therapy plan).              Referring Provider:  Capri Marte    Initial Assessment  See Epic Evaluation- Start of Care Date: 01/13/25

## 2025-01-19 ENCOUNTER — PATIENT OUTREACH (OUTPATIENT)
Dept: CARE COORDINATION | Facility: CLINIC | Age: 65
End: 2025-01-19
Payer: COMMERCIAL

## 2025-01-29 SDOH — HEALTH STABILITY: PHYSICAL HEALTH: ON AVERAGE, HOW MANY MINUTES DO YOU ENGAGE IN EXERCISE AT THIS LEVEL?: 30 MIN

## 2025-01-29 SDOH — HEALTH STABILITY: PHYSICAL HEALTH: ON AVERAGE, HOW MANY DAYS PER WEEK DO YOU ENGAGE IN MODERATE TO STRENUOUS EXERCISE (LIKE A BRISK WALK)?: 7 DAYS

## 2025-01-29 ASSESSMENT — SOCIAL DETERMINANTS OF HEALTH (SDOH): HOW OFTEN DO YOU GET TOGETHER WITH FRIENDS OR RELATIVES?: TWICE A WEEK

## 2025-02-03 ENCOUNTER — OFFICE VISIT (OUTPATIENT)
Dept: FAMILY MEDICINE | Facility: CLINIC | Age: 65
End: 2025-02-03
Payer: COMMERCIAL

## 2025-02-03 VITALS
TEMPERATURE: 98.2 F | WEIGHT: 148.7 LBS | HEART RATE: 66 BPM | RESPIRATION RATE: 21 BRPM | OXYGEN SATURATION: 99 % | DIASTOLIC BLOOD PRESSURE: 78 MMHG | SYSTOLIC BLOOD PRESSURE: 118 MMHG | BODY MASS INDEX: 26.35 KG/M2 | HEIGHT: 63 IN

## 2025-02-03 DIAGNOSIS — M85.80 OSTEOPENIA, UNSPECIFIED LOCATION: ICD-10-CM

## 2025-02-03 DIAGNOSIS — F41.9 ANXIETY: ICD-10-CM

## 2025-02-03 DIAGNOSIS — Z00.00 ENCOUNTER FOR MEDICARE ANNUAL WELLNESS EXAM: Primary | ICD-10-CM

## 2025-02-03 DIAGNOSIS — Z78.0 ASYMPTOMATIC MENOPAUSAL STATE: ICD-10-CM

## 2025-02-03 DIAGNOSIS — E78.5 HYPERLIPIDEMIA, UNSPECIFIED HYPERLIPIDEMIA TYPE: ICD-10-CM

## 2025-02-03 DIAGNOSIS — I10 BENIGN ESSENTIAL HYPERTENSION: ICD-10-CM

## 2025-02-03 LAB
ANION GAP SERPL CALCULATED.3IONS-SCNC: 12 MMOL/L (ref 7–15)
BUN SERPL-MCNC: 16.4 MG/DL (ref 8–23)
CALCIUM SERPL-MCNC: 10 MG/DL (ref 8.8–10.4)
CHLORIDE SERPL-SCNC: 103 MMOL/L (ref 98–107)
CHOLEST SERPL-MCNC: 255 MG/DL
CREAT SERPL-MCNC: 0.78 MG/DL (ref 0.51–0.95)
EGFRCR SERPLBLD CKD-EPI 2021: 84 ML/MIN/1.73M2
FASTING STATUS PATIENT QL REPORTED: ABNORMAL
FASTING STATUS PATIENT QL REPORTED: NORMAL
GLUCOSE SERPL-MCNC: 95 MG/DL (ref 70–99)
HCO3 SERPL-SCNC: 25 MMOL/L (ref 22–29)
HDLC SERPL-MCNC: 51 MG/DL
LDLC SERPL CALC-MCNC: 171 MG/DL
NONHDLC SERPL-MCNC: 204 MG/DL
POTASSIUM SERPL-SCNC: 4.3 MMOL/L (ref 3.4–5.3)
SODIUM SERPL-SCNC: 140 MMOL/L (ref 135–145)
TRIGL SERPL-MCNC: 163 MG/DL

## 2025-02-03 PROCEDURE — 36415 COLL VENOUS BLD VENIPUNCTURE: CPT | Performed by: NURSE PRACTITIONER

## 2025-02-03 PROCEDURE — 80061 LIPID PANEL: CPT | Performed by: NURSE PRACTITIONER

## 2025-02-03 PROCEDURE — 90677 PCV20 VACCINE IM: CPT | Performed by: NURSE PRACTITIONER

## 2025-02-03 PROCEDURE — 80048 BASIC METABOLIC PNL TOTAL CA: CPT | Performed by: NURSE PRACTITIONER

## 2025-02-03 PROCEDURE — G2211 COMPLEX E/M VISIT ADD ON: HCPCS | Performed by: NURSE PRACTITIONER

## 2025-02-03 PROCEDURE — G0402 INITIAL PREVENTIVE EXAM: HCPCS | Performed by: NURSE PRACTITIONER

## 2025-02-03 PROCEDURE — G0009 ADMIN PNEUMOCOCCAL VACCINE: HCPCS | Performed by: NURSE PRACTITIONER

## 2025-02-03 PROCEDURE — 99214 OFFICE O/P EST MOD 30 MIN: CPT | Mod: 25 | Performed by: NURSE PRACTITIONER

## 2025-02-03 RX ORDER — FLUOXETINE 10 MG/1
10 CAPSULE ORAL DAILY
Qty: 90 CAPSULE | Refills: 4 | Status: SHIPPED | OUTPATIENT
Start: 2025-02-03

## 2025-02-03 RX ORDER — METOPROLOL TARTRATE 25 MG/1
25 TABLET, FILM COATED ORAL 2 TIMES DAILY
Qty: 180 TABLET | Refills: 4 | Status: SHIPPED | OUTPATIENT
Start: 2025-02-03

## 2025-02-03 ASSESSMENT — PAIN SCALES - GENERAL: PAINLEVEL_OUTOF10: NO PAIN (0)

## 2025-02-03 NOTE — NURSING NOTE
Prior to immunization administration, verified patients identity using patient s name and date of birth. Please see Immunization Activity for additional information.     Screening Questionnaire for Adult Immunization    Are you sick today?   No   Do you have allergies to medications, food, a vaccine component or latex?   Yes   Have you ever had a serious reaction after receiving a vaccination?   No   Do you have a long-term health problem with heart, lung, kidney, or metabolic disease (e.g., diabetes), asthma, a blood disorder, no spleen, complement component deficiency, a cochlear implant, or a spinal fluid leak?  Are you on long-term aspirin therapy?   No   Do you have cancer, leukemia, HIV/AIDS, or any other immune system problem?   No   Do you have a parent, brother, or sister with an immune system problem?   Yes   In the past 3 months, have you taken medications that affect  your immune system, such as prednisone, other steroids, or anticancer drugs; drugs for the treatment of rheumatoid arthritis, Crohn s disease, or psoriasis; or have you had radiation treatments?   No   Have you had a seizure, or a brain or other nervous system problem?   No   During the past year, have you received a transfusion of blood or blood    products, or been given immune (gamma) globulin or antiviral drug?   No   For women: Are you pregnant or is there a chance you could become       pregnant during the next month?   No   Have you received any vaccinations in the past 4 weeks?   No     Immunization questionnaire was positive for at least one answer.  Notified Debra Mercado NP.      Patient instructed to remain in clinic for 15 minutes afterwards, and to report any adverse reactions.     Screening performed by Vee Mishra MA on 2/3/2025 at 11:26 AM.

## 2025-02-03 NOTE — PATIENT INSTRUCTIONS
Patient Education   Preventive Care Advice   This is general advice given by our system to help you stay healthy. However, your care team may have specific advice just for you. Please talk to your care team about your preventive care needs.  Nutrition  Eat 5 or more servings of fruits and vegetables each day.  Try wheat bread, brown rice and whole grain pasta (instead of white bread, rice, and pasta).  Get enough calcium and vitamin D. Check the label on foods and aim for 100% of the RDA (recommended daily allowance).  Lifestyle  Exercise at least 150 minutes each week  (30 minutes a day, 5 days a week).  Do muscle strengthening activities 2 days a week. These help control your weight and prevent disease.  No smoking.  Wear sunscreen to prevent skin cancer.  Have a dental exam and cleaning every 6 months.  Yearly exams  See your health care team every year to talk about:  Any changes in your health.  Any medicines your care team has prescribed.  Preventive care, family planning, and ways to prevent chronic diseases.  Shots (vaccines)   HPV shots (up to age 26), if you've never had them before.  Hepatitis B shots (up to age 59), if you've never had them before.  COVID-19 shot: Get this shot when it's due.  Flu shot: Get a flu shot every year.  Tetanus shot: Get a tetanus shot every 10 years.  Pneumococcal, hepatitis A, and RSV shots: Ask your care team if you need these based on your risk.  Shingles shot (for age 50 and up)  General health tests  Diabetes screening:  Starting at age 35, Get screened for diabetes at least every 3 years.  If you are younger than age 35, ask your care team if you should be screened for diabetes.  Cholesterol test: At age 39, start having a cholesterol test every 5 years, or more often if advised.  Bone density scan (DEXA): At age 50, ask your care team if you should have this scan for osteoporosis (brittle bones).  Hepatitis C: Get tested at least once in your life.  STIs (sexually  transmitted infections)  Before age 24: Ask your care team if you should be screened for STIs.  After age 24: Get screened for STIs if you're at risk. You are at risk for STIs (including HIV) if:  You are sexually active with more than one person.  You don't use condoms every time.  You or a partner was diagnosed with a sexually transmitted infection.  If you are at risk for HIV, ask about PrEP medicine to prevent HIV.  Get tested for HIV at least once in your life, whether you are at risk for HIV or not.  Cancer screening tests  Cervical cancer screening: If you have a cervix, begin getting regular cervical cancer screening tests starting at age 21.  Breast cancer scan (mammogram): If you've ever had breasts, begin having regular mammograms starting at age 40. This is a scan to check for breast cancer.  Colon cancer screening: It is important to start screening for colon cancer at age 45.  Have a colonoscopy test every 10 years (or more often if you're at risk) Or, ask your provider about stool tests like a FIT test every year or Cologuard test every 3 years.  To learn more about your testing options, visit:   .  For help making a decision, visit:   https://bit.ly/wg28181.  Prostate cancer screening test: If you have a prostate, ask your care team if a prostate cancer screening test (PSA) at age 55 is right for you.  Lung cancer screening: If you are a current or former smoker ages 50 to 80, ask your care team if ongoing lung cancer screenings are right for you.  For informational purposes only. Not to replace the advice of your health care provider. Copyright   2023 [employer]. All rights reserved. Clinically reviewed by the [facility] Transitions Program. [employer] [id number] - REV 01/24.

## 2025-02-03 NOTE — PROGRESS NOTES
"Preventive Care Visit  [facility]  Debra Mercado, NP, Nurse Practitioner - Family  Feb 3, 2025      Assessment & Plan     (Z00.00) Encounter for Medicare annual wellness exam  (primary encounter diagnosis)  Comment:   Plan:     (I10) Benign essential hypertension  Comment: at goal  Plan: metoprolol tartrate (LOPRESSOR) 25 MG tablet,         Basic metabolic panel  (Ca, Cl, CO2, Creat,         Gluc, K, Na, BUN)        The current medical regimen is effective;  continue present plan and medications.     (M85.80) Osteopenia, unspecified location  Comment:   Plan: DEXA HIP/PELVIS/SPINE - Future            (F41.9) Anxiety  Comment: stable  Plan: FLUoxetine (PROZAC) 10 MG capsule        The current medical regimen is effective;  continue present plan and medications.     (Z78.0) Asymptomatic menopausal state  Comment:   Plan: DEXA HIP/PELVIS/SPINE - Future            (E78.5) Hyperlipidemia, unspecified hyperlipidemia type  Comment:   Plan: Lipid panel reflex to direct LDL Non-fasting        Discussed her current [place] score of 7.9% and current guidelines.  Will recalculate after today's labs.  She will let me know if she would like me to order a CAC scan or start a statin if her risk score is still over 7.5%.       The longitudinal plan of care for the diagnosis(es)/condition(s) as documented were addressed during this visit. Due to the added complexity in care, I will continue to support Jannet in the subsequent management and with ongoing continuity of care.      BMI  Estimated body mass index is 26.68 kg/m  as calculated from the following:    Height as of this encounter: 1.59 m (5' 2.6\").    Weight as of this encounter: 67.4 kg (148 lb 11.2 oz).       Counseling  Appropriate preventive services were addressed with this patient via screening, questionnaire, or discussion as appropriate for fall prevention, nutrition, physical activity, Tobacco-use cessation, social engagement, weight " loss and cognition.  Checklist reviewing preventive services available has been given to the patient.  Reviewed patient's diet, addressing concerns and/or questions.           Kali Power is a 64 year old, presenting for the following:  Medicare Visit        2/3/2025    10:29 AM   Additional Questions   Roomed by DOMENIC Henning   Accompanied by Self          HPI  Her blood pressure has been well-controlled on her current medication.    Her mood is stable and she is doing well on her current dose of fluoxetine.            Health Care Directive  Patient does not have a Health Care Directive: Discussed advance care planning with patient; information given to patient to review.      1/29/2025   General Health   How would you rate your overall physical health? Good   Feel stress (tense, anxious, or unable to sleep) Only a little   (!) STRESS CONCERN      1/29/2025   Nutrition   Diet: Regular (no restrictions)         1/29/2025   Exercise   Days per week of moderate/strenous exercise 7 days   Average minutes spent exercising at this level 30 min         1/29/2025   Social Factors   Frequency of gathering with friends or relatives Twice a week   Worry food won't last until get money to buy more No   Food not last or not have enough money for food? No   Do you have housing? (Housing is defined as stable permanent housing and does not include staying ouside in a car, in a tent, in an abandoned building, in an overnight shelter, or couch-surfing.) Yes   Are you worried about losing your housing? No   Lack of transportation? No   Unable to get utilities (heat,electricity)? No         1/29/2025   Fall Risk   Fallen 2 or more times in the past year? No   Trouble with walking or balance? No          1/29/2025   Activities of Daily Living- Home Safety   Needs help with the following daily activites None of the above   Safety concerns in the home None of the above         1/29/2025   Dental   Dentist two times every year? Yes          2025   Hearing Screening   Hearing concerns? None of the above         2025   Driving Risk Screening   Patient/family members have concerns about driving No         2025   General Alertness/Fatigue Screening   Have you been more tired than usual lately? No         2025   Urinary Incontinence Screening   Bothered by leaking urine in past 6 months No         2025   TB Screening   Were you born outside of the [place]? No         Today's PHQ-2 Score:       2/3/2025    10:15 AM   PHQ-2 (  [employer])   Q1: Little interest or pleasure in doing things 0   Q2: Feeling down, depressed or hopeless 0   PHQ-2 Score 0    Q1: Little interest or pleasure in doing things Not at all   Q2: Feeling down, depressed or hopeless Not at all   PHQ-2 Score 0       Patient-reported           2025   Substance Use   Alcohol more than 3/day or more than 7/wk No   Do you have a current opioid prescription? No   How severe/bad is pain from 1 to 10? 0/10 (No Pain)   Do you use any other substances recreationally? No     Social History     Tobacco Use    Smoking status: Former     Current packs/day: 0.00     Average packs/day: 1 pack/day for 10.0 years (10.0 ttl pk-yrs)     Types: Cigarettes     Start date: 2002     Quit date: 2008     Years since quittin.1    Smokeless tobacco: Never    Tobacco comments:     smoked age [age], quit x 1 yr and resumed then quit after one yr   Vaping Use    Vaping status: Never Used   Substance Use Topics    Alcohol use: Yes     Comment: 1-2 per day    Drug use: No           2023   LAST FHS-7 RESULTS   1st degree relative breast or ovarian cancer No   Any relative bilateral breast cancer No   Any male have breast cancer No   Any ONE woman have BOTH breast AND ovarian cancer No   Any woman with breast cancer before 50yrs No   2 or more relatives with breast AND/OR ovarian cancer No   2 or more relatives with breast AND/OR bowel cancer No              History of abnormal Pap  smear:         Latest Ref Rng & Units 9/1/2021    11:52 AM 9/26/2016     4:10 PM 9/26/2016    12:00 AM   PAP / HPV   PAP  Negative for Intraepithelial Lesion or Malignancy (NILM)      PAP (Historical)    NIL    HPV 16 DNA Negative Negative  Negative     HPV 18 DNA Negative Negative  Negative     Other HR HPV Negative Negative  Negative       ASCVD Risk   The 10-year ASCVD risk score (Yue SANTANA, et al., 2019) is: 7.9%    Values used to calculate the score:      Age: 64 years      Sex: Female      Is Non- : No      Diabetic: No      Tobacco smoker: No      Systolic Blood Pressure: 118 mmHg      Is BP treated: Yes      HDL Cholesterol: 53 mg/dL      Total Cholesterol: 293 mg/dL            Reviewed and updated as needed this visit by Provider                      Current providers sharing in care for this patient include:  Patient Care Team:  Debra Mercado NP as PCP - General  Myriam Gotti MD as MD (Endocrinology, Diabetes, and Metabolism)  Debra Mercado NP as Assigned PCP  Bernadette Sierra PALukasC as Physician Assistant (Dermatology)  Robles Longoria MD as MD (Gastroenterology)    The following health maintenance items are reviewed in Epic and correct as of today:  Health Maintenance   Topic Date Due    Pneumococcal Vaccine: 50+ Years (1 of 1 - PCV) Never done    DEXA  09/13/2024    ANNUAL REVIEW OF HM ORDERS  07/30/2025    COVID-19 Vaccine (8 - 2024-25 season) 03/26/2025    MAMMO SCREENING  06/06/2025    BMP  07/30/2025    MEDICARE ANNUAL WELLNESS VISIT  02/03/2026    FALL RISK ASSESSMENT  02/03/2026    GLUCOSE  07/30/2027    COLORECTAL CANCER SCREENING  08/22/2027    LIPID  07/30/2029    ADVANCE CARE PLANNING  02/03/2030    DTAP/TDAP/TD IMMUNIZATION (3 - Td or Tdap) 01/11/2033    HEPATITIS C SCREENING  Completed    HIV SCREENING  Completed    PHQ-2 (once per calendar year)  Completed    INFLUENZA VACCINE  Completed    ZOSTER IMMUNIZATION  Completed    RSV VACCINE   "Completed    HPV IMMUNIZATION  Aged Out    MENINGITIS IMMUNIZATION  Aged Out    RSV MONOCLONAL ANTIBODY  Aged Out    PAP  Discontinued    LUNG CANCER SCREENING  Discontinued            Objective    Exam  /78 (BP Location: Right arm, Patient Position: Sitting, Cuff Size: Adult Regular)   Pulse 66   Temp 98.2  F (36.8  C) (Temporal)   Resp 21   Ht 1.59 m (5' 2.6\")   Wt 67.4 kg (148 lb 11.2 oz)   LMP 12/18/2004   SpO2 99%   BMI 26.68 kg/m     Estimated body mass index is 26.68 kg/m  as calculated from the following:    Height as of this encounter: 1.59 m (5' 2.6\").    Weight as of this encounter: 67.4 kg (148 lb 11.2 oz).    Physical Exam  GENERAL: alert and no distress  EYES: Eyes grossly normal to inspection, PERRL and conjunctivae and sclerae normal  HENT: ear canals and TM's normal, nose and mouth without ulcers or lesions  NECK: no adenopathy, no asymmetry, masses, or scars  RESP: lungs clear to auscultation - no rales, rhonchi or wheezes  CV: regular rate and rhythm, normal S1 S2, no S3 or S4, no murmur, click or rub, no peripheral edema  ABDOMEN: soft, nontender, no hepatosplenomegaly, no masses and bowel sounds normal  MS: no gross musculoskeletal defects noted, no edema  SKIN: no suspicious lesions or rashes  NEURO: Normal strength and tone, mentation intact and speech normal  PSYCH: mentation appears normal, affect normal/bright         2/3/2025   Mini Cog   Clock Draw Score 2 Normal   3 Item Recall 3 objects recalled   Mini Cog Total Score 5         Vision Screen  Patient wears corrective lenses (select all that apply): Worn during vision screen, Wears regularly, Comments  Vision Screen Results: (!) REFER      Signed Electronically by: Debra Mercado NP    "

## 2025-02-17 ENCOUNTER — ANCILLARY PROCEDURE (OUTPATIENT)
Dept: GENERAL RADIOLOGY | Facility: CLINIC | Age: 65
End: 2025-02-17
Payer: COMMERCIAL

## 2025-02-17 ENCOUNTER — OFFICE VISIT (OUTPATIENT)
Dept: FAMILY MEDICINE | Facility: CLINIC | Age: 65
End: 2025-02-17
Payer: COMMERCIAL

## 2025-02-17 VITALS
OXYGEN SATURATION: 97 % | RESPIRATION RATE: 16 BRPM | SYSTOLIC BLOOD PRESSURE: 131 MMHG | DIASTOLIC BLOOD PRESSURE: 77 MMHG | TEMPERATURE: 96.6 F | HEART RATE: 63 BPM

## 2025-02-17 DIAGNOSIS — R05.2 SUBACUTE COUGH: Primary | ICD-10-CM

## 2025-02-17 DIAGNOSIS — R05.2 SUBACUTE COUGH: ICD-10-CM

## 2025-02-17 PROCEDURE — 99213 OFFICE O/P EST LOW 20 MIN: CPT

## 2025-02-17 PROCEDURE — 71046 X-RAY EXAM CHEST 2 VIEWS: CPT | Mod: TC | Performed by: RADIOLOGY

## 2025-02-17 RX ORDER — ALBUTEROL SULFATE 90 UG/1
2 INHALANT RESPIRATORY (INHALATION) EVERY 6 HOURS PRN
Qty: 18 G | Refills: 0 | Status: SHIPPED | OUTPATIENT
Start: 2025-02-17

## 2025-02-17 RX ORDER — METHYLPREDNISOLONE 4 MG/1
TABLET ORAL
Qty: 21 TABLET | Refills: 0 | Status: SHIPPED | OUTPATIENT
Start: 2025-02-17

## 2025-02-17 RX ORDER — BENZONATATE 100 MG/1
100 CAPSULE ORAL 3 TIMES DAILY PRN
Qty: 30 CAPSULE | Refills: 0 | Status: SHIPPED | OUTPATIENT
Start: 2025-02-17

## 2025-02-17 ASSESSMENT — PAIN SCALES - GENERAL: PAINLEVEL_OUTOF10: NO PAIN (0)

## 2025-02-17 NOTE — PATIENT INSTRUCTIONS
Considering how long you have been coughing, we should get a chest xray today to be sure that there is not concern for infection in your lungs. If this is the case, we will need to treat you with an antibiotic. In the meantime, I am going to send you with a number of other medications and recommendations that will be helpful to manage your cough as well.    I have prescribed you an inhaler to help with your coughing. It is called albuterol, and it will help to open up the airways to your lungs. You can use 2 puffs when you are coughing, wheezing, or feeling short of breath. If your symptoms do not improve after a few minutes, you can use 2 more puffs. I would encourage you to be seen in the clinic if you are needing more than 4 puffs to resolve your breathing concerns.     I have also prescribed a medication called Tessalon Perels. This is a cough medication that can be useful to prevent coughing jags overnight, and help you get a good nights rest. This medication can be used during the day, but it can often make people a bit drowsy, so I would encourage you to take it in the evening for the first time to determine how your body reacts to the medication.    Medrol Dosepak: This medication is an oral steroid.  Oftentimes an oral steroid can help to reduce inflammation that is causing ongoing discomfort. Please take this medication as the packaging describes, and be sure to finish the entire course. These types of medication can cause side effects including insomnia, increased hunger, and emotional irritability.  Please take it in the morning with food to help reduce the side effects, and they should resolve once you are done taking the medication.     Saline spray: I would like you to  with saline spray over-the-counter.  This works best when you lean slightly forward, insert the spray nozzle into your nostril, and direct the nozzle towards the opposite side of your face.  This is sometimes easier to be done  in the shower over the sink as it can get a little bit messy.  You will know that you have done this correctly if your eyes slightly water after spraying the solution.  You can do this as prescribed on the box for as long as it takes to treat your symptoms.    Ibuprofen and Tylenol: You can take ibuprofen and Tylenol as prescribed on the box around the clock.  You can either take them on alternating schedules or you can take them together.  These medications work differently in your body, and are safe to be taken together.    Humidifier: Using a humidifier in the area that you sleep or taking a very hot shower to inhale some of the vaporized steam can help to open up your nasal passages and sinuses and encourage them to drain.    Rest: get adequate rest including 7-8 hours of sleep, and low activity levels during the day to encourage healing. Avoid high impact exercise and rigorous physical labor    Nutrition: support healing by fueling your body with healthy foods. Fruits, vegetables, and whole foods are all great options!    Hydration: increase fluid intake. Illness leads to increased dehydration, so your body needs more fluid intake than it might when you are healthy. Mg and sugar free teas are great options. Try to avoid beverages that cause more dehydration including coffee, soda, energy drinks, and alcohol.     Please follow-up in the next few days to let me know how you are feeling.    It is important to seek immediate medical attention if you are having symptoms of chest pain, fever, shortness of breath, palpitations, or any changes in your mental status.

## 2025-02-17 NOTE — PROGRESS NOTES
Assessment & Plan     (R05.2) Subacute cough  (primary encounter diagnosis)  Comment: Subacute and stable. Offered education on medications including appropriate dosing, possible side effects, and possible adverse effects.  Education given on return to clinic instructions as well as alarm signs that would require the need for immediate medical attention.  Patient attested to understanding.  Differential diagnoses include postviral cough syndrome versus pneumonia.  Chest x-ray is clear today which helps rule out concerns for pneumonia that would warrant the need for antibiotic treatment.  The remainder of her illness symptoms have completely resolved, and considering that chest x-ray is clear, the remainder of her HPI and physical examination are supportive of a diagnosis of postviral cough syndrome.  Begin treating with a course of steroids as well as albuterol and Tessalon Perles for cough management. Offered education on medications including appropriate dosing, possible side effects, and possible adverse effects.  Education given on return to clinic instructions as well as alarm signs that would require the need for immediate medical attention.  Patient attested to understanding.  Plan: albuterol (PROAIR HFA/PROVENTIL HFA/VENTOLIN         HFA) 108 (90 Base) MCG/ACT inhaler, benzonatate        (TESSALON) 100 MG capsule, methylPREDNISolone         (MEDROL DOSEPAK) 4 MG tablet therapy pack, XR         Chest 2 Views      This progress note has been dictated, with use of voice recognition software. Any grammatical, typographical, or context errors are unintentional and inherent to use of voice recognition software.     Ordering of each unique test  Prescription drug management  I spent a total of 10 minutes on the day of the visit.   Time spent by me today doing chart review, history and exam, documentation and further activities per the note      FUTURE APPOINTMENTS:       - Follow-up visit in 1 week if symptoms  are not improving       - Follow-up for annual visit or as needed  Patient Instructions   Considering how long you have been coughing, we should get a chest xray today to be sure that there is not concern for infection in your lungs. If this is the case, we will need to treat you with an antibiotic. In the meantime, I am going to send you with a number of other medications and recommendations that will be helpful to manage your cough as well.    I have prescribed you an inhaler to help with your coughing. It is called albuterol, and it will help to open up the airways to your lungs. You can use 2 puffs when you are coughing, wheezing, or feeling short of breath. If your symptoms do not improve after a few minutes, you can use 2 more puffs. I would encourage you to be seen in the clinic if you are needing more than 4 puffs to resolve your breathing concerns.     I have also prescribed a medication called Tessalon Perels. This is a cough medication that can be useful to prevent coughing jags overnight, and help you get a good nights rest. This medication can be used during the day, but it can often make people a bit drowsy, so I would encourage you to take it in the evening for the first time to determine how your body reacts to the medication.    Medrol Dosepak: This medication is an oral steroid.  Oftentimes an oral steroid can help to reduce inflammation that is causing ongoing discomfort. Please take this medication as the packaging describes, and be sure to finish the entire course. These types of medication can cause side effects including insomnia, increased hunger, and emotional irritability.  Please take it in the morning with food to help reduce the side effects, and they should resolve once you are done taking the medication.     Saline spray: I would like you to  with saline spray over-the-counter.  This works best when you lean slightly forward, insert the spray nozzle into your nostril, and direct  the nozzle towards the opposite side of your face.  This is sometimes easier to be done in the shower over the sink as it can get a little bit messy.  You will know that you have done this correctly if your eyes slightly water after spraying the solution.  You can do this as prescribed on the box for as long as it takes to treat your symptoms.    Ibuprofen and Tylenol: You can take ibuprofen and Tylenol as prescribed on the box around the clock.  You can either take them on alternating schedules or you can take them together.  These medications work differently in your body, and are safe to be taken together.    Humidifier: Using a humidifier in the area that you sleep or taking a very hot shower to inhale some of the vaporized steam can help to open up your nasal passages and sinuses and encourage them to drain.    Rest: get adequate rest including 7-8 hours of sleep, and low activity levels during the day to encourage healing. Avoid high impact exercise and rigorous physical labor    Nutrition: support healing by fueling your body with healthy foods. Fruits, vegetables, and whole foods are all great options!    Hydration: increase fluid intake. Illness leads to increased dehydration, so your body needs more fluid intake than it might when you are healthy. Mg and sugar free teas are great options. Try to avoid beverages that cause more dehydration including coffee, soda, energy drinks, and alcohol.     Please follow-up in the next few days to let me know how you are feeling.    It is important to seek immediate medical attention if you are having symptoms of chest pain, fever, shortness of breath, palpitations, or any changes in your mental status.      Kali Power is a 64 year old, presenting for the following health issues:  office visit (Patient reports they are here with a cough lasting since 1/17/25. Started as a regular cold, cough has gotten drier and progressively worse. )        2/17/2025     9:31  AM   Additional Questions   Roomed by Lisa   Accompanied by alone         2/17/2025     9:31 AM   Patient Reported Additional Medications   Patient reports taking the following new medications none     History of Present Illness       Reason for visit:  Very bad persistant cough.  sick since Jan. 17.  Symptom onset:  3-4 weeks ago  Symptoms include:  Bad cough  Symptom intensity:  Severe  Symptom progression:  Staying the same  Had these symptoms before:  No   She is taking medications regularly.  Jannet is a 64-year-old female with a past medical history significant for osteopenia, dry mouth, vitamin D deficiency, Sjogren's syndrome, restless leg syndrome, and esotropia of the right eye who presents today with concerns for ongoing cough.  Patient reports that she had an upper respiratory illness that began on January 17 and included sinus and nasal congestion, and a wet and productive cough.  She notes that 1 week after the start of illness, the majority of her symptoms resolved, but her cough turned into a dry and barky cough that has been persistent ever since.  She notes that it becomes progressively worse throughout the day, and causes a mild amount of tightness in her chest, but she declines any wheezing or shortness of breath.  She is not experiencing any other ongoing illness symptoms including fever, chills, body aches, GI upset, sinus congestion, sore throat, or fatigue.  She has tried some over-the-counter medications as well as steam humidification with little improvement in her concerns.  She has no history of asthma or reactive airway symptoms.    Review of Systems  Constitutional, HEENT, cardiovascular, pulmonary, gi and gu systems are negative, except as otherwise noted.      Objective    /77 (BP Location: Left arm, Patient Position: Sitting, Cuff Size: Adult Regular)   Pulse 63   Temp (!) 96.6  F (35.9  C) (Temporal)   Resp 16   LMP 12/18/2004   SpO2 97%   There is no height or weight on file  to calculate BMI.  Physical Exam   GENERAL: alert and no distress  EYES: Eyes grossly normal to inspection, PERRL and conjunctivae and sclerae normal  HENT: ear canals and TM's normal, nose and mouth without ulcers or lesions  NECK: no adenopathy, no asymmetry, masses, or scars  RESP: lungs clear to auscultation - no rales, rhonchi or wheezes  CV: regular rate and rhythm, normal S1 S2, no S3 or S4, no murmur, click or rub, no peripheral edema  ABDOMEN: soft, nontender, no hepatosplenomegaly, no masses and bowel sounds normal  MS: no gross musculoskeletal defects noted, no edema    Results for orders placed or performed in visit on 02/17/25 (from the past 24 hours)   XR Chest 2 Views    Narrative    EXAM: XR CHEST 2 VIEWS  LOCATION: [facility]  DATE: 2/17/2025    INDICATION:  Subacute cough  COMPARISON: None.      Impression    IMPRESSION: Heart size within normal limits. Lungs are clear. Degenerative change thoracic spine. Surgical clips right upper quadrant.       Benita Green DNP FNP-C  Family Nurse Practitioner - Same Day Provider  [facility] [place]    Signed Electronically by: JONATAN Daniels CNP

## 2025-03-22 ENCOUNTER — HEALTH MAINTENANCE LETTER (OUTPATIENT)
Age: 65
End: 2025-03-22

## 2025-05-23 ENCOUNTER — ANCILLARY PROCEDURE (OUTPATIENT)
Dept: MAMMOGRAPHY | Facility: CLINIC | Age: 65
End: 2025-05-23
Attending: NURSE PRACTITIONER
Payer: COMMERCIAL

## 2025-05-23 DIAGNOSIS — Z12.31 VISIT FOR SCREENING MAMMOGRAM: ICD-10-CM

## 2025-05-23 PROCEDURE — 77067 SCR MAMMO BI INCL CAD: CPT | Mod: TC | Performed by: RADIOLOGY

## 2025-05-23 PROCEDURE — 77063 BREAST TOMOSYNTHESIS BI: CPT | Mod: TC | Performed by: RADIOLOGY

## 2025-05-26 ENCOUNTER — PATIENT OUTREACH (OUTPATIENT)
Dept: CARE COORDINATION | Facility: CLINIC | Age: 65
End: 2025-05-26
Payer: COMMERCIAL

## 2025-08-19 ENCOUNTER — OFFICE VISIT (OUTPATIENT)
Dept: FAMILY MEDICINE | Facility: CLINIC | Age: 65
End: 2025-08-19
Payer: COMMERCIAL

## 2025-08-19 VITALS
WEIGHT: 148 LBS | HEART RATE: 50 BPM | RESPIRATION RATE: 16 BRPM | BODY MASS INDEX: 26.55 KG/M2 | OXYGEN SATURATION: 98 % | SYSTOLIC BLOOD PRESSURE: 137 MMHG | DIASTOLIC BLOOD PRESSURE: 76 MMHG | TEMPERATURE: 97 F

## 2025-08-19 DIAGNOSIS — R73.09 ELEVATED GLUCOSE: ICD-10-CM

## 2025-08-19 DIAGNOSIS — K58.0 IRRITABLE BOWEL SYNDROME WITH DIARRHEA: Primary | ICD-10-CM

## 2025-08-19 DIAGNOSIS — Z13.29 SCREENING FOR THYROID DISORDER: ICD-10-CM

## 2025-08-19 LAB
ERYTHROCYTE [DISTWIDTH] IN BLOOD BY AUTOMATED COUNT: 12.7 % (ref 10–15)
EST. AVERAGE GLUCOSE BLD GHB EST-MCNC: 111 MG/DL
HBA1C MFR BLD: 5.5 % (ref 0–5.6)
HCT VFR BLD AUTO: 41.4 % (ref 35–47)
HGB BLD-MCNC: 13.1 G/DL (ref 11.7–15.7)
MCH RBC QN AUTO: 31 PG (ref 26.5–33)
MCHC RBC AUTO-ENTMCNC: 31.6 G/DL (ref 31.5–36.5)
MCV RBC AUTO: 97.9 FL (ref 78–100)
PLATELET # BLD AUTO: 272 10E3/UL (ref 150–450)
RBC # BLD AUTO: 4.23 10E6/UL (ref 3.8–5.2)
WBC # BLD AUTO: 6.7 10E3/UL (ref 4–11)

## 2025-08-19 PROCEDURE — 83036 HEMOGLOBIN GLYCOSYLATED A1C: CPT | Performed by: NURSE PRACTITIONER

## 2025-08-19 PROCEDURE — G2211 COMPLEX E/M VISIT ADD ON: HCPCS | Performed by: NURSE PRACTITIONER

## 2025-08-19 PROCEDURE — 3075F SYST BP GE 130 - 139MM HG: CPT | Performed by: NURSE PRACTITIONER

## 2025-08-19 PROCEDURE — 1126F AMNT PAIN NOTED NONE PRSNT: CPT | Performed by: NURSE PRACTITIONER

## 2025-08-19 PROCEDURE — 3044F HG A1C LEVEL LT 7.0%: CPT | Performed by: NURSE PRACTITIONER

## 2025-08-19 PROCEDURE — 36415 COLL VENOUS BLD VENIPUNCTURE: CPT | Performed by: NURSE PRACTITIONER

## 2025-08-19 PROCEDURE — 3078F DIAST BP <80 MM HG: CPT | Performed by: NURSE PRACTITIONER

## 2025-08-19 PROCEDURE — 84443 ASSAY THYROID STIM HORMONE: CPT | Mod: GZ | Performed by: NURSE PRACTITIONER

## 2025-08-19 PROCEDURE — 99214 OFFICE O/P EST MOD 30 MIN: CPT | Performed by: NURSE PRACTITIONER

## 2025-08-19 PROCEDURE — 80053 COMPREHEN METABOLIC PANEL: CPT | Mod: GZ | Performed by: NURSE PRACTITIONER

## 2025-08-19 PROCEDURE — 85027 COMPLETE CBC AUTOMATED: CPT | Performed by: NURSE PRACTITIONER

## 2025-08-19 PROCEDURE — 86364 TISS TRNSGLTMNASE EA IG CLAS: CPT | Performed by: NURSE PRACTITIONER

## 2025-08-19 ASSESSMENT — ENCOUNTER SYMPTOMS: ABDOMINAL PAIN: 1

## 2025-08-19 ASSESSMENT — PAIN SCALES - GENERAL: PAINLEVEL_OUTOF10: NO PAIN (0)

## 2025-08-20 LAB
ALBUMIN SERPL BCG-MCNC: 4.3 G/DL (ref 3.5–5.2)
ALP SERPL-CCNC: 65 U/L (ref 40–150)
ALT SERPL W P-5'-P-CCNC: 14 U/L (ref 0–50)
ANION GAP SERPL CALCULATED.3IONS-SCNC: 9 MMOL/L (ref 7–15)
AST SERPL W P-5'-P-CCNC: 18 U/L (ref 0–45)
BILIRUB SERPL-MCNC: 0.2 MG/DL
BUN SERPL-MCNC: 19.3 MG/DL (ref 8–23)
CALCIUM SERPL-MCNC: 9.8 MG/DL (ref 8.8–10.4)
CHLORIDE SERPL-SCNC: 104 MMOL/L (ref 98–107)
CREAT SERPL-MCNC: 0.78 MG/DL (ref 0.51–0.95)
EGFRCR SERPLBLD CKD-EPI 2021: 83 ML/MIN/1.73M2
GLUCOSE SERPL-MCNC: 96 MG/DL (ref 70–99)
HCO3 SERPL-SCNC: 26 MMOL/L (ref 22–29)
POTASSIUM SERPL-SCNC: 4.9 MMOL/L (ref 3.4–5.3)
PROT SERPL-MCNC: 7.1 G/DL (ref 6.4–8.3)
SODIUM SERPL-SCNC: 139 MMOL/L (ref 135–145)
TSH SERPL DL<=0.005 MIU/L-ACNC: 1.47 UIU/ML (ref 0.3–4.2)

## 2025-08-21 LAB
TTG IGA SER-ACNC: 0.8 U/ML
TTG IGG SER-ACNC: <0.6 U/ML

## 2025-08-24 LAB
FAT STL QL: NORMAL
NEUTRAL FAT STL QL: NORMAL

## 2025-08-25 LAB
CALPROTECTIN STL-MCNT: 10.4 MG/KG (ref 0–49.9)
ELASTASE PANC STL-MCNT: 407 UG/G

## (undated) DEVICE — SYR 30ML SLIP TIP W/O NDL 302833

## (undated) DEVICE — SUCTION MANIFOLD NEPTUNE 2 SYS 4 PORT 0702-020-000

## (undated) DEVICE — DRAPE SHEET MED 44X70" 9355

## (undated) DEVICE — SPECIMEN BAG MEDIVAC SUCTION WHITE SOCK 65652-122

## (undated) DEVICE — ENDO SNARE EXACTO COLD 9MM LOOP 2.4MMX230CM 00711115

## (undated) DEVICE — KIT ENDO TURNOVER/PROCEDURE CARRY-ON 101822

## (undated) DEVICE — ENDO TRAP POLYP E-TRAP 00711099

## (undated) DEVICE — GOWN IMPERVIOUS 2XL BLUE

## (undated) DEVICE — BASIN SET SINGLE STERILE 13752-624

## (undated) DEVICE — SUCTION MANIFOLD NEPTUNE 2 SYS 1 PORT 702-025-000

## (undated) DEVICE — SOL WATER IRRIG 1000ML BOTTLE 2F7114

## (undated) DEVICE — TUBING SUCTION MEDI-VAC 1/4"X20' N620A

## (undated) DEVICE — SPECIMEN CONTAINER 3OZ W/FORMALIN 59901

## (undated) DEVICE — Device

## (undated) DEVICE — SNARE CAPIVATOR ROUND COLD SNR BX10 M00561101

## (undated) DEVICE — CLIP HEMOSTASIS ASSURANCE W16 MM BX00711884

## (undated) DEVICE — ENDO BITE BLOCK ADULT OMNI-BLOC

## (undated) DEVICE — CLIP HEMOSTATIC ASSURANCE W11 MM 00711882

## (undated) DEVICE — SOL WATER IRRIG 500ML BOTTLE 2F7113

## (undated) DEVICE — JELLY LUBRICATING SURGILUBE 2OZ TUBE

## (undated) DEVICE — ENDO FORCEP ENDOJAW BIOPSY 2.8MMX160CM FB-220K

## (undated) RX ORDER — NITROGLYCERIN 0.4 MG/1
TABLET SUBLINGUAL
Status: DISPENSED
Start: 2019-07-19

## (undated) RX ORDER — METOPROLOL TARTRATE 100 MG
TABLET ORAL
Status: DISPENSED
Start: 2019-07-19

## (undated) RX ORDER — FENTANYL CITRATE 50 UG/ML
INJECTION, SOLUTION INTRAMUSCULAR; INTRAVENOUS
Status: DISPENSED
Start: 2018-04-11

## (undated) RX ORDER — FENTANYL CITRATE 50 UG/ML
INJECTION, SOLUTION INTRAMUSCULAR; INTRAVENOUS
Status: DISPENSED
Start: 2019-01-22